# Patient Record
Sex: FEMALE | Race: WHITE | NOT HISPANIC OR LATINO | Employment: FULL TIME | URBAN - METROPOLITAN AREA
[De-identification: names, ages, dates, MRNs, and addresses within clinical notes are randomized per-mention and may not be internally consistent; named-entity substitution may affect disease eponyms.]

---

## 2017-01-04 ENCOUNTER — APPOINTMENT (OUTPATIENT)
Dept: PHYSICAL THERAPY | Facility: REHABILITATION | Age: 62
End: 2017-01-04
Payer: COMMERCIAL

## 2017-01-04 PROCEDURE — 97110 THERAPEUTIC EXERCISES: CPT

## 2017-01-04 PROCEDURE — 97140 MANUAL THERAPY 1/> REGIONS: CPT

## 2017-01-04 PROCEDURE — 97112 NEUROMUSCULAR REEDUCATION: CPT

## 2017-01-06 ENCOUNTER — APPOINTMENT (OUTPATIENT)
Dept: PHYSICAL THERAPY | Facility: REHABILITATION | Age: 62
End: 2017-01-06
Payer: COMMERCIAL

## 2017-01-06 PROCEDURE — 97140 MANUAL THERAPY 1/> REGIONS: CPT

## 2017-01-06 PROCEDURE — 97112 NEUROMUSCULAR REEDUCATION: CPT

## 2017-01-06 PROCEDURE — 97110 THERAPEUTIC EXERCISES: CPT

## 2017-01-09 ENCOUNTER — APPOINTMENT (OUTPATIENT)
Dept: PHYSICAL THERAPY | Facility: REHABILITATION | Age: 62
End: 2017-01-09
Payer: COMMERCIAL

## 2017-01-09 PROCEDURE — 97140 MANUAL THERAPY 1/> REGIONS: CPT

## 2017-01-09 PROCEDURE — 97110 THERAPEUTIC EXERCISES: CPT

## 2017-01-09 PROCEDURE — 97112 NEUROMUSCULAR REEDUCATION: CPT

## 2017-01-11 ENCOUNTER — APPOINTMENT (OUTPATIENT)
Dept: PHYSICAL THERAPY | Facility: REHABILITATION | Age: 62
End: 2017-01-11
Payer: COMMERCIAL

## 2017-01-11 PROCEDURE — 97112 NEUROMUSCULAR REEDUCATION: CPT

## 2017-01-11 PROCEDURE — 97110 THERAPEUTIC EXERCISES: CPT

## 2017-01-11 PROCEDURE — 97140 MANUAL THERAPY 1/> REGIONS: CPT

## 2017-01-16 ENCOUNTER — APPOINTMENT (OUTPATIENT)
Dept: PHYSICAL THERAPY | Facility: REHABILITATION | Age: 62
End: 2017-01-16
Payer: COMMERCIAL

## 2017-01-16 PROCEDURE — 97110 THERAPEUTIC EXERCISES: CPT

## 2017-01-16 PROCEDURE — 97140 MANUAL THERAPY 1/> REGIONS: CPT

## 2017-01-16 PROCEDURE — 97112 NEUROMUSCULAR REEDUCATION: CPT

## 2017-01-18 ENCOUNTER — APPOINTMENT (OUTPATIENT)
Dept: PHYSICAL THERAPY | Facility: REHABILITATION | Age: 62
End: 2017-01-18
Payer: COMMERCIAL

## 2017-01-18 PROCEDURE — 97112 NEUROMUSCULAR REEDUCATION: CPT

## 2017-01-18 PROCEDURE — 97110 THERAPEUTIC EXERCISES: CPT

## 2017-01-18 PROCEDURE — 97140 MANUAL THERAPY 1/> REGIONS: CPT

## 2017-01-23 ENCOUNTER — APPOINTMENT (OUTPATIENT)
Dept: PHYSICAL THERAPY | Facility: REHABILITATION | Age: 62
End: 2017-01-23
Payer: COMMERCIAL

## 2017-01-25 ENCOUNTER — APPOINTMENT (OUTPATIENT)
Dept: PHYSICAL THERAPY | Facility: REHABILITATION | Age: 62
End: 2017-01-25
Payer: COMMERCIAL

## 2017-01-30 ENCOUNTER — APPOINTMENT (OUTPATIENT)
Dept: PHYSICAL THERAPY | Facility: REHABILITATION | Age: 62
End: 2017-01-30
Payer: COMMERCIAL

## 2017-05-02 ENCOUNTER — GENERIC CONVERSION - ENCOUNTER (OUTPATIENT)
Dept: OTHER | Facility: OTHER | Age: 62
End: 2017-05-02

## 2017-05-22 ENCOUNTER — GENERIC CONVERSION - ENCOUNTER (OUTPATIENT)
Dept: OTHER | Facility: OTHER | Age: 62
End: 2017-05-22

## 2017-05-26 ENCOUNTER — GENERIC CONVERSION - ENCOUNTER (OUTPATIENT)
Dept: OTHER | Facility: OTHER | Age: 62
End: 2017-05-26

## 2017-09-06 ENCOUNTER — TRANSCRIBE ORDERS (OUTPATIENT)
Dept: ADMINISTRATIVE | Facility: HOSPITAL | Age: 62
End: 2017-09-06

## 2017-09-06 DIAGNOSIS — D42.9 NEOPLASM OF UNCERTAIN BEHAVIOR OF MENINGES (HCC): Primary | ICD-10-CM

## 2017-09-20 ENCOUNTER — GENERIC CONVERSION - ENCOUNTER (OUTPATIENT)
Dept: OTHER | Facility: OTHER | Age: 62
End: 2017-09-20

## 2017-09-24 DIAGNOSIS — Z12.31 ENCOUNTER FOR SCREENING MAMMOGRAM FOR MALIGNANT NEOPLASM OF BREAST: ICD-10-CM

## 2017-10-07 ENCOUNTER — HOSPITAL ENCOUNTER (OUTPATIENT)
Dept: RADIOLOGY | Age: 62
Discharge: HOME | End: 2017-10-07
Payer: COMMERCIAL

## 2017-10-07 DIAGNOSIS — Z12.31 ENCOUNTER FOR SCREENING MAMMOGRAM FOR MALIGNANT NEOPLASM OF BREAST: ICD-10-CM

## 2017-10-07 PROCEDURE — G0202 SCR MAMMO BI INCL CAD: HCPCS

## 2017-10-20 ENCOUNTER — TRANSCRIBE ORDERS (OUTPATIENT)
Dept: ADMINISTRATIVE | Facility: HOSPITAL | Age: 62
End: 2017-10-20

## 2017-10-20 DIAGNOSIS — R00.2 PALPITATION: ICD-10-CM

## 2017-10-20 DIAGNOSIS — R94.31 ABNORMAL ELECTROCARDIOGRAM: Primary | ICD-10-CM

## 2017-11-02 ENCOUNTER — HOSPITAL ENCOUNTER (OUTPATIENT)
Dept: NON INVASIVE DIAGNOSTICS | Facility: CLINIC | Age: 62
Discharge: HOME | End: 2017-11-02
Payer: COMMERCIAL

## 2017-11-02 DIAGNOSIS — R94.31 ABNORMAL ELECTROCARDIOGRAM: ICD-10-CM

## 2017-11-02 DIAGNOSIS — R00.2 PALPITATION: ICD-10-CM

## 2017-11-02 LAB
CHEST PAIN STATEMENT: NORMAL
MAX DIASTOLIC BP: 80 MMHG
MAX HEART RATE: 155 BPM
MAX PREDICTED HEART RATE: 159 BPM
MAX. SYSTOLIC BP: 206 MMHG
PROTOCOL NAME: NORMAL
REASON FOR TERMINATION: NORMAL
TARGET HR FORMULA: NORMAL
TIME IN EXERCISE PHASE: 540 S

## 2017-11-02 PROCEDURE — 93225 XTRNL ECG REC<48 HRS REC: CPT

## 2017-11-02 PROCEDURE — 93226 XTRNL ECG REC<48 HR SCAN A/R: CPT

## 2017-11-02 PROCEDURE — 93017 CV STRESS TEST TRACING ONLY: CPT

## 2017-11-30 ENCOUNTER — ALLSCRIPTS OFFICE VISIT (OUTPATIENT)
Dept: OTHER | Facility: OTHER | Age: 62
End: 2017-11-30

## 2017-11-30 ENCOUNTER — TRANSCRIBE ORDERS (OUTPATIENT)
Dept: ADMINISTRATIVE | Facility: HOSPITAL | Age: 62
End: 2017-11-30

## 2017-11-30 DIAGNOSIS — I49.1 ATRIAL PREMATURE DEPOLARIZATION: ICD-10-CM

## 2017-11-30 DIAGNOSIS — R00.2 PALPITATIONS: Primary | ICD-10-CM

## 2017-11-30 DIAGNOSIS — D49.0 NEOPLASM OF DIGESTIVE SYSTEM: ICD-10-CM

## 2017-11-30 DIAGNOSIS — R00.2 PALPITATIONS: ICD-10-CM

## 2017-12-05 NOTE — CONSULTS
Assessment    1  Hypertension (401 9) (I10)   2  PAC (premature atrial contraction) (427 61) (I49 1)   3  Palpitations (785 1) (R00 2)    Plan  Hypertension    · EKG/ECG- POC; Status:Complete;   Done: 50PPS4507   Perform: In Office; [phone]; Last Updated By:Scheier, Mackie Cassis; 11/30/2017 8:37:01 AM;Ordered;  For:Hypertension; Ordered By:Jonathan Robert;  PAC (premature atrial contraction)    · (1) BASIC METABOLIC PROFILE; Status:Active; Requested for:30Nov2017;    Perform:[facility]; Due:30Nov2018; Ordered; For:PAC (premature atrial contraction); Ordered By:Jonathan Robert;   · (1) MAGNESIUM; Status:Active; Requested for:30Nov2017;    Perform:[facility]; Due:30Nov2018; Ordered; For:PAC (premature atrial contraction); Ordered By:Jonathan Robert;   · (1) TSH; Status:Active; Requested for:30Nov2017;    Perform:[facility]; Due:30Nov2018; Ordered; For:PAC (premature atrial contraction); Ordered By:Jonathan Robert;  Palpitations    · Metoprolol Succinate ER 25 MG Oral Tablet Extended Release 24 Hour; Take 1  tablet daily   Rx By: Ramila Hampton; Dispense: 0 Days ; #:90 Tablet Extended Release 24 Hour; Refill: 3; For: Palpitations; FINESSE = N; Sent To: Aakash Cronin [id number]   · Follow-up visit in 1 year Evaluation and Treatment  Follow-up  Status: Hold For -  Scheduling  Requested for: 75BPE5796   Ordered; For: Palpitations; Ordered By: Ramila Hampton Performed:  Due: 04CSE5510   · ECHO COMPLETE WITH CONTRAST IF INDICATED; Status:Need Information - Financial  Authorization; Requested for:30Nov2017;    Perform:[facility] Radiology; [phone]; Ordered; For:Palpitations; Ordered By:Jonathan Robert;   · EKG/ECG- POC; Status:Active - Perform Order; Requested PFI:[id number];    Perform: In Office; [phone]; Ordered; For:Palpitations; Ordered By:Jonathan Robert; Discussion/Summary    Premature atrial contractions frequent   No obvious structural heart disease by clinical examination, will do an echocardiogram for a recent stress test was unrevealing  Favor low-dose beta blocker because of the symptoms and frequency  Will check an EKG and blood pressure in 2 weeks times  Regular exercise and stress management recommended  Will check electrolytes as well as well as thyroid function test       Chief Complaint  Pt  here for new patient consult due to palpitations  History of Present Illness  Cardiology HPI Free Text Note Form [facility]: Cardiology consultation for evaluation of palpitations  Most pleasant 26-year-old white female who has no previous cardiac history  She was found to have premature atrial contraction during a colonoscopy  She is mildly symptomatic from that mostly at night when she is at rest  Date do get worse when she drinks caffeinated products  Denies any syncope or presyncope  She denies any chest pain or dyspnea  Symptoms are mild and present for quite some time  There is no crescendo pattern  She underwent a stress test that revealed no evidence of ischemia at high workload, 9 minutes on a Fredrick protocol  There were no exercise-induced arrhythmias  Holter monitor revealed very frequent atrial ectopy with over 12,000 PACs 11 4%  Most of them asymptomatic  There were no sustained tachyarrhythmias  The patient is active but does not exercise regularly  She admits to mild-to-moderate stress in her life  Review of Systems      Cardiac: rhythm problems, has swelling in the right ankle and palpitations present , but as noted in HPI, no chest pain, no fainting/blackouts, no syncope/fainting, no AM fatigue and no witnessed apnea episodes     Skin: No complaints of nonhealing sores or skin rash , no non healing sores present and no rashes seen   Genitourinary: blood in urine, loss of bladder control and post menopausal, but no recurrent urinary tract infections, no difficult urination, no kidney stones and no kidney problems   Psychological: anxiety and palpitations present , but as noted in HPI, no depression, no panic attacks and no difficulty concentrating  General: trouble sleeping, but no appetite changes, no changes in weight, no lack of energy/fatigue, no fever, no night sweats and no frequent infections  Respiratory: No complaints of shortness of breath, cough with sputum, or wheezing , no shortness of breath, no cough/sputum, no wheezing, no phlegm and no hemoptysis  HEENT: nose problems, but no serious eye problems, no hearing problems, no throat problems and no snoring   Gastrointestinal: liver problems, but no nausea, no vomiting, no heartburn, no bloody stools, no diarrhea, no constipation, no abdonimal pain and no rectal bleeding Liver mass, possibly adenoma, expectant management  Hematologic: No complaints of bleeding disorders, anemia, blood clots, or excessive brusing  , no bleeding disorders, no anemia, no blood clots and no excessive bruising   Neurological: daytime sleepiness, but no numbness, no tingling, no weakness, no seizures, no headaches, no dizziness and no diplopia   Musculoskeletal: arthritis and back pain, but no swelling/pain      Active Problems    1  Aftercare following joint replacement (V54 81) (Z47 1)   2  Arthralgia of knee (719 46) (M25 569)   3  Arthritis (716 90) (M19 90)   4  Atrophic vaginitis (627 3) (N95 2)   5  Benign Neoplasm Of The Liver (211 5)   6  Disorder of sacrum (724 6) (M53 3)   7  Dyslipidemia (272 4) (E78 5)   8  Encounter for gynecological examination (V72 31) (Z01 419)   9  Encounter for screening mammogram for malignant neoplasm of breast (V76 12)   (Z12 31)   10  Herpes simplex labialis (054 9) (B00 1)   11  Hypertension (401 9) (I10)   12  Menopause (627 2) (Z78 0)   13  Microscopic hematuria (599 72) (R31 29)   14  Neoplasm of liver (239 0) (D49 0)   15  Nosebleed (784 7) (R04 0)   16  Osteoarthritis of both knees (715 96) (M17 0)   17   Vulvar lesion (624 8) (N90 89)    Past Medical History    · History Of 3  Previous Pregnancies (V61 5)   · History of hematuria (V13 09) (Z87 448)   · History of Previous Pregnancies Resulted In 2  Live Birth(S)    The active problems and past medical history were reviewed and updated today  Surgical History    · History of Cholecystectomy   · History of Hysterectomy   · History of Knee Surgery   · History of Salpingo-oophorectomy Bilateral    The surgical history was reviewed and updated today  Family History  Mother    · Family history of Arthritis (V17 7)  Father    · Family history of Carcinoma Of The Bladder (N10 72)  Family History Reviewed: The family history was reviewed and updated today  Social History    · Denied: History of Alcohol   · Marital History   · [address]   · Never A Smoker  The social history was reviewed and updated today  Current Meds   1  ALPRAZolam 0 5 MG Oral Tablet; Therapy: 96QMZ8198 to (Evaluate:64Ckr9996) Recorded   2  Calcium Citrate +D TABS; Take 1 tablet daily Recorded   3  Cephalexin 500 MG Oral Tablet; Take 4 tablets, one hour prior to dental procedures; Therapy: 02JVU2656 to (Last Rx:20Jun2016)  Requested for: 20Jun2016 Ordered   4  DrRx Mycolog II Cream 15 Grams; apply TID to vagina to the affected area; Therapy: 49XIX7867 to (Last Rx:20Sep2017) Ordered   5  Estrace 0 1 MG/GM Vaginal Cream; insert 1 gram intravaginally twice weekly; Therapy: 77OZJ8240 to (Last Rx:74Xeh6687)  Requested for: 23XDY3047 Ordered   6  Lotrel 5-10 MG Oral Capsule; TAKE 1 CAPSULE DAILY; Therapy: (Recorded:03Dfo8925) to Recorded   7  Mobic 7 5 MG Oral Tablet; TAKE 1 TABLET DAILY AS NEEDED; Therapy: 13UHU0951 to Recorded   8  Vitamin D3 1000 UNIT Oral Tablet; TAKE 1 TABLET DAILY; Therapy: (Recorded:05Jan2016) to Recorded    The medication list was reviewed and updated today  Allergies    1  No Known Drug Allergies    2   FRUIT    Vitals  Signs    Heart Rate: 84, Apical  Pulse Quality: Regular, Apical  Systolic: 447, RUE, Sitting  Diastolic: 90, RUE, Sitting  Height: 5 ft 1 25 in  Weight: 157 lb   BMI Calculated: 29 42  BSA Calculated: 1 71    Physical Exam    Constitutional   General appearance: No acute distress, well appearing and well nourished  appears healthy, overweight, well hydrated and appearance reflects stated age  Neck   Neck and thyroid: Normal, supple, trachea midline, no thyromegaly  Jugular Veins: the JVP was not elevated and no sustained hepatojugular reflux  Pulmonary   Respiratory effort: No increased work of breathing or signs of respiratory distress  Auscultation of lungs: Clear to auscultation, no rales, no rhonchi, no wheezing, good air movement  Cardiovascular   Palpation of heart: Normal PMI, no thrills  The PMI was palpated at the 5th LICS in the midclavicular line  The apical impulse was normal  no precordial heave was noted  Auscultation of heart: Normal rate and rhythm, normal S1 and S2, no murmurs  The heart rate was normal  The rhythm was regular with premature beats  Heart sounds: normal S1, normal S2, no gallop heard, no S3, no S4, no click and the heart sounds were not distant  No pericardial rub  Trigeminy pattern  no murmurs were heard  Carotid pulses: Normal, 2+ bilaterally  Pedal pulses: Normal, 2+ bilaterally  Examination of extremities for edema and/or varicosities: Normal     Chest - Chest: Normal    Neurologic - Speech: Normal     Psychiatric - Orientation to person, place, and time: Normal  Mood and affect: Normal       Results/Data    Rhythm and rate:  normal sinus rhythm  Ectopic Beats: Frequent atrial premature contractions are present  T waves:   there are nonspecific ST-T wave changes  Future Appointments    Date/Time Provider Specialty Site   12/15/2017 08:15 AM JANE Chaves  Surgical Oncology [facility] SURGICAL ONCOLOGY   09/21/2018 08:00 AM MASHA Pitts Obstetrics/Gynecology [facility]     End of Encounter Meds    1   Cephalexin 500 MG Oral Tablet (Cephalexin Monohydrate); Take 4 tablets, one hour prior   to dental procedures; Therapy: 18WTS5253 to (Last Rx:20Jun2016)  Requested for: 20Jun2016 Ordered    2  Estrace 0 1 MG/GM Vaginal Cream; insert 1 gram intravaginally twice weekly; Therapy: 11QGD4217 to (Last Rx:76Dfe1074)  Requested for: 65VPO6204 Ordered    3  Metoprolol Succinate ER 25 MG Oral Tablet Extended Release 24 Hour; Take 1 tablet   daily; Therapy: 41GRY4858 to (Last Rx:30Nov2017)  Requested for: 50FPW2008; Status:   ACTIVE - Transmit to Pharmacy - Awaiting Verification Ordered    4  DrRx Mycolog II Cream 15 Grams; apply TID to vagina to the affected area; Therapy: 82QSW8148 to (Last Rx:20Sep2017) Ordered    5  ALPRAZolam 0 5 MG Oral Tablet; Therapy: 68ASR3066 to (Evaluate:39Puy7373) Recorded   6  Calcium Citrate +D TABS; Take 1 tablet daily Recorded   7  Lotrel 5-10 MG Oral Capsule (Amlodipine Besy-Benazepril HCl); TAKE 1 CAPSULE   DAILY; Therapy: (Recorded:01Rwl2442) to Recorded   8  Mobic 7 5 MG Oral Tablet (Meloxicam); TAKE 1 TABLET DAILY AS NEEDED; Therapy: 77HEP8623 to Recorded   9  Vitamin D3 1000 UNIT Oral Tablet; TAKE 1 TABLET DAILY;    Therapy: (RCHKBJNP:60HEI5006) to Recorded    Signatures   Electronically signed by : JANE Lopez ; Nov 30 2017  9:00AM EST                       (Author)

## 2017-12-08 ENCOUNTER — HOSPITAL ENCOUNTER (OUTPATIENT)
Dept: NON INVASIVE DIAGNOSTICS | Facility: CLINIC | Age: 62
Discharge: HOME | End: 2017-12-08
Payer: COMMERCIAL

## 2017-12-08 ENCOUNTER — GENERIC CONVERSION - ENCOUNTER (OUTPATIENT)
Dept: CARDIOLOGY CLINIC | Facility: CLINIC | Age: 62
End: 2017-12-08

## 2017-12-08 ENCOUNTER — GENERIC CONVERSION - ENCOUNTER (OUTPATIENT)
Dept: OTHER | Facility: OTHER | Age: 62
End: 2017-12-08

## 2017-12-08 DIAGNOSIS — R00.2 PALPITATIONS: ICD-10-CM

## 2017-12-08 LAB
ATRIAL RATE: 66 BPM
P AXIS: 59 DEGREES
PR INTERVAL: 116 MS
QRS AXIS: 50 DEGREES
QRSD INTERVAL: 80 MS
QT INTERVAL: 416 MS
QTC INTERVAL: 436 MS
T WAVE AXIS: 60 DEGREES
VENTRICULAR RATE: 66 BPM

## 2017-12-08 PROCEDURE — 93005 ELECTROCARDIOGRAM TRACING: CPT

## 2017-12-08 PROCEDURE — 93306 TTE W/DOPPLER COMPLETE: CPT

## 2017-12-09 ENCOUNTER — HOSPITAL ENCOUNTER (OUTPATIENT)
Dept: RADIOLOGY | Facility: HOSPITAL | Age: 62
Discharge: HOME | End: 2017-12-09
Attending: SURGERY
Payer: COMMERCIAL

## 2017-12-09 ENCOUNTER — GENERIC CONVERSION - ENCOUNTER (OUTPATIENT)
Dept: OTHER | Facility: OTHER | Age: 62
End: 2017-12-09

## 2017-12-09 DIAGNOSIS — D42.9 NEOPLASM OF UNCERTAIN BEHAVIOR OF MENINGES (HCC): ICD-10-CM

## 2017-12-09 PROCEDURE — A9581 GADOXETATE DISODIUM INJ: HCPCS | Performed by: SURGERY

## 2017-12-09 PROCEDURE — 74183 MRI ABD W/O CNTR FLWD CNTR: CPT

## 2017-12-09 RX ADMIN — GADOXETATE DISODIUM 7 ML: 181.43 INJECTION, SOLUTION INTRAVENOUS at 12:47

## 2017-12-15 ENCOUNTER — GENERIC CONVERSION - ENCOUNTER (OUTPATIENT)
Dept: OTHER | Facility: OTHER | Age: 62
End: 2017-12-15

## 2018-01-10 NOTE — PROGRESS NOTES
Assessment    1  Neoplasm of liver (239 0) (D49 0)    Plan  Neoplasm of liver    · (1) BUN; Status:Active; Requested for:29Apr2018; Perform:[facility]; Due:29Apr2019;Ordered;  For:Neoplasm of liver; Ordered By:Stanton Baez;   · (1) CREATININE; Status:Active; Requested for:29Apr2018; Perform:[facility]; Due:29Apr2019;Ordered;  For:Neoplasm of liver; Ordered By:Stanton Baez;   · * MRI ABDOMEN W WO CONTRAST; Status:Need Information - Financial Authorization; Requested for:29Apr2018; Perform:[facility]; Due:29Apr2019;Ordered;  For:Neoplasm of liver; Ordered By:Stanton Baez;   · Follow Up in 2 Years Evaluation and Treatment  Follow-up  Status: Hold For - Scheduling   Requested for: 29Apr2016   Ordered; For: Neoplasm of liver; Ordered By: Shaq Ching Performed:  Due: 48KIZ8076    Discussion/Summary  Discussion Summary:   81-year-old female with a benign-appearing lesion in the left lobe of the liver  This is probably a hepatic adenoma  It has never been biopsied  I once again discussed risks of rupture with hepatic adenoma  Based on its small size I am comfortable observing this  We discussed symptoms of rupture  The other area stable and is most likely [address]  We discussed no further imaging versus repeating her MRI in 2 years  She would like to repeat this in 2 years, consequently I will repeat her MRI in 2 years and I will see her again at that time for a clinical exam  All her questions were answered  Medication SE Review and Pt Understands Tx: The treatment plan was reviewed with the patient/guardian  The patient/guardian understands and agrees with the treatment plan   Understands and agrees with treatment plan: The treatment plan was reviewed with the patient/guardian  The patient/guardian understands and agrees with the treatment plan      Chief Complaint  Chief Complaint Free Text Note Form: Pt  here for 1 year liver follow up  Pt  had MRI done 12/5/2015   No new complaints  History of Present Illness  Interval History: Patient returns in followup of her liver MRI from December 5, 2015 this revealed stable benign-appearing hepatic lesions  A 12 mm left hepatic lobe lesion was most likely an adenoma  An area near the junction of the right and left lobes was stable last conspicuous 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  She denies any abdominal pain, nausea, vomiting, or weight loss  Her appetite is good  She is not on any hormone replacement  Review of Systems  Complete Female ROS SurgOnc:   Constitutional: The patient denies new or recent history of general fatigue, no recent weight loss, no change in appetite  Eyes: No complaints of visual problems, no scleral icterus  ENT: no complaints of ear pain, no hoarseness, no difficulty swallowing, no tinnitus and no new masses in head, oral cavity, or neck  Cardiovascular: No complaints of chest pain, no palpitations, no ankle edema  Respiratory: No complaints of shortness of breath, no cough  Gastrointestinal: No complaints of jaundice, no bloody stools, no pale stools  Genitourinary: No complaints of dysuria, no hematuria, no nocturia, no frequent urination, no urethral discharge  Musculoskeletal: No complaints of weakness, paralysis, joint stiffness or arthralgias,  Integumentary: No complaints of rash, no new lesions  Neurological: No complaints of convulsions, no seizures, no dizziness  Hematologic/Lymphatic: No complaints of easy bruising  ROS Reviewed:   ROS reviewed  Active Problems    1  Aftercare following joint replacement (V54 81) (Z47 1)   2  Arthralgia of knee (719 46) (M25 569)   3  Arthritis (716 90) (M19 90)   4  Benign Neoplasm Of The Liver (211 5)   5  Disorder of sacrum (724 6) (M53 3)   6  Dyslipidemia (272 4) (E78 5)   7  Encounter for gynecological examination (V72 31) (Z01 419)   8  Encounter for screening mammogram for malignant neoplasm of breast (V76 12)   (Z12 31)   9   Herpes simplex labialis (054 9) (B00 1)   10  Hypertension (401 9) (I10)   11  Menopause (627 2) (Z78 0)   12  Microscopic hematuria (599 72) (R31 2)   13  Neoplasm of liver (239 0) (D49 0)   14  Nosebleed (784 7) (R04 0)   15  Osteoarthritis of both knees (715 96) (M17 0)   16  Vulvar lesion (624 8) (N90 89)    Past Medical History    1  History Of 3  Previous Pregnancies (V61 5)   2  History of hematuria (V13 09) (Z87 448)   3  History of Previous Pregnancies Resulted In 2  Live Birth(S)    Surgical History    1  History of Cholecystectomy   · 2009   2  History of Hysterectomy   · 2004   3  History of Knee Surgery   4  History of Salpingo-oophorectomy Bilateral  Surgical History Reviewed: The surgical history was reviewed and updated today  Family History  Mother    1  Family history of Arthritis (V17 7)  Father    2  Family history of Carcinoma Of The Bladder (A69 36)  Family History Reviewed: The family history was reviewed and updated today  Social History    · Denied: History of Alcohol   · Marital History   · Never A Smoker  Social History Reviewed: The social history was reviewed and updated today  Current Meds   1  ALPRAZolam 0 5 MG Oral Tablet; Therapy: 21DXG8578 to (Evaluate:46Qfd6518) Recorded   2  Calcium Citrate +D TABS; Take 1 tablet daily Recorded   3  Cephalexin 500 MG Oral Tablet; Take 4 tablets, one hour prior to dental procedures; Therapy: 93XZY9670 to (Last Rx:85Yds5422)  Requested for: 84Rdb8635 Ordered   4  Daily Multiple Vitamins Oral Tablet; Take 1 tablet daily Recorded   5  Lanacane Maximum Strength 20-0 2 % External Cream; USE TOPICALLY AS DIRECTED; Therapy: 59NFS2082 to (Evaluate:25Mar2016)  Requested for: 62Dto2929; Last   Rx:24Dkt7761 Ordered   6  Lotrel 5-10 MG Oral Capsule; TAKE 1 CAPSULE DAILY; Therapy: (Recorded:05Pbk8387) to Recorded   7  Mobic 7 5 MG Oral Tablet; take 1 tablet daily as needed Recorded   8   Nystatin-Triamcinolone 555981-3 1 UNIT/GM-% External Cream; apply bid times 10   days; Therapy: 19Apr2016 to (Gleisabellaette Gain)  Requested for: 19Apr2016; Last   Rx:19Apr2016 Ordered   9  Vitamin D3 1000 UNIT Oral Tablet; TAKE 1 TABLET DAILY; Therapy: (Recorded:05Jan2016) to Recorded  Medication List Reviewed: The medication list was reviewed and updated today  Allergies    1  No Known Drug Allergies    2  FRUIT    Vitals  Vital Signs [Data Includes: Current Encounter]    Recorded: 80DLL4281 08:29AM   Temperature 98 1 F   Heart Rate 72   Systolic 271   Diastolic 80   Height 5 ft 2 in   Weight 156 lb    BMI Calculated 28 53   BSA Calculated 1 72     Physical Exam    Constitutional: General appearance: The Patient is well-developed, well-nourished female who appears her stated age in no acute distress  She is pleasant and talkative  HEENT: Sclerae are anicteric  Mucous membranes are moist   Neck is supple without adenopathy  No JVD  Chest: The lungs are clear to auscultation  Cardiac: Heart is regular rate  Abdomen: Abdomen is soft, nontender without masses  Extremities: There is no clubbing or cyanosis  There is no edema  Neuro: Grossly nonfocal   Gait is normal     Lymphatic: no evidence of cervical adenopathy bilaterally  no evidence of axillary adenopathy bilaterally  no evidence of inguinal adenopathy bilaterally  Skin: Warm, anicteric  Results/Data  Results   * MRI Abdomen With and Without Contrast 97ELQ7011 08:54AM Nasima Benito     Test Name Result Flag Reference   MRI Abd W/ & W/O (Report)     [address];;[place];[address]   12/05/2015 0900   12/05/2015 1000   N/A     MRI OF THE ABDOMEN (LIVER) WITH AND WITHOUT CONTRAST     INDICATION- Follow-up hepatic lesions       COMPARISON- MRI abdomen 4/19/2014, 4/20/2013     TECHNIQUE- The following pulse sequences were obtained on a 1 5 T   scanner- Axial T1-weighted in-and-out-of phase, pre-contrast axial T1   with fat saturation, post-contrast dynamic axial T1 with fat saturation   at 20, 70, and 180 seconds, coronal T1 with fat saturation, coronal and   axial T2 with TE of 90 and 180 respectively, axial T2 with fat   saturation, 10 and 20 minute delayed axial T1 with fat saturation   through the liver  7 mL of Eovist was administered intravenously   without immediate complication  Pre- and postcontrast subtraction   images were also obtained  FINDINGS-   Study is limited by rest respiratory artifact  LIVER-   General- Right lobe is elongated which may represent Riedel's variant  No generalized loss of signal on out-of-phase images to suggest   hepatic steatosis  Lesions-      12 mm anterior left hepatic lobe lateral segment nodule is stable  This is hypointense on T1, isointense on T2  Intralesional fat is   again noted on out of phase imaging  No discernible arterial   enhancement  This remains hypovascular on hepatocyte phase imaging  Again this is most consistent with adenoma  Irregular focus of hyperintensity on hepatocyte phase imaging near the   junction of the right and left lobes is also stable or less   conspicuous  Again this demonstrates no precontrast T1 or T2 weighted   signal abnormality and has progressive enhancement from portal venous   to the three-minute delayed images  This again 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,   favoring FNH  7 mm medial segment left hepatic lobe cyst is stable  Vasculature- Portal and hepatic veins patent without evidence of   thrombosis  BILIARY TREE- Normal       GALLBLADDER- Surgically absent  PANCREAS- Normal      ADRENAL GLANDS- Normal      SPLEEN- Normal      KIDNEYS-    Tiny bilateral renal cysts  ABDOMINAL CAVITY- No lymphadenopathy or ascites  BOWEL- Unremarkable MRI appearance  OSSEOUS STRUCTURES- No osseous destruction       EXTRAHEPATIC VASCULAR STRUCTURES- Visualized vasculature is normal      ABDOMINAL WALL- Normal      LUNG BASES- Unremarkable MRI appearance  IMPRESSION-     Stable, benign-appearing hepatic lesions  No new or suspicious hepatic   lesions identified  Tiny renal cysts  Transcribed on- [address], RAD DO   Reading Radiologist- BREN Graf DO   Electronically Var[address], RAD DO   Released Date Time- 12/06/15 1605   ------------------------------------------------------------------------------   [id number]^JEFE DAWN   [id number]^JEFE DAWN     Future Appointments    Date/Time Provider Specialty Site   05/03/2016 08:00 AM JANE Eric  Orthopedic Surgery [facility]   09/07/2016 01:00 PM Lisa Thomas MD Obstetrics/Gynecology [facility] Encounter Meds    1  Cephalexin 500 MG Oral Tablet (Cephalexin Monohydrate); Take 4 tablets, one hour prior   to dental procedures; Therapy: 29TBG9503 to (Last Rx:17Aug2015)  Requested for: 32Yva1293 Ordered    2  Nystatin-Triamcinolone 544693-9 1 UNIT/GM-% External Cream; apply bid times 10   days; Therapy: 37Kfu3781 to ([facility])  Requested for: 16Vbu0226; Last   Rx:72Lqz4493 Ordered    3  Lanacane Maximum Strength 20-0 2 % External Cream; USE TOPICALLY AS DIRECTED; Therapy: 19FPC2357 to (Evaluate:25Mar2016)  Requested for: 30Ipy7061; Last   Rx:53Xwc9349 Ordered    4  ALPRAZolam 0 5 MG Oral Tablet; Therapy: 11FZR0631 to (Evaluate:09Feb2015) Recorded   5  Calcium Citrate +D TABS; Take 1 tablet daily Recorded   6  Daily Multiple Vitamins Oral Tablet; Take 1 tablet daily Recorded   7  Lotrel 5-10 MG Oral Capsule (Amlodipine Besy-Benazepril HCl); TAKE 1 CAPSULE   DAILY; Therapy: (Recorded:20Bzo8889) to Recorded   8  Mobic 7 5 MG Oral Tablet (Meloxicam); take 1 tablet daily as needed Recorded   9  Vitamin D3 1000 UNIT Oral Tablet; TAKE 1 TABLET DAILY;    Therapy: (Recorded:05Jan2016) to Recorded    Signatures   Electronically signed by : JANE Choi ; Apr 29 2016  8:59AM EST (Author)

## 2018-01-12 VITALS
DIASTOLIC BLOOD PRESSURE: 90 MMHG | SYSTOLIC BLOOD PRESSURE: 130 MMHG | WEIGHT: 157 LBS | BODY MASS INDEX: 29.64 KG/M2 | HEIGHT: 61 IN | HEART RATE: 84 BPM

## 2018-01-15 NOTE — MISCELLANEOUS
Message   Recorded as Task   Date: 05/02/2017 08:12 AM, Created By: Love Turner   Task Name: Med Renewal Request   Assigned To: Mickey Weir   Regarding Patient: Alexandre Hardin, Status: Active   CommentHellen Lank - 02 May 2017 8:12 AM     TASK CREATED    recvd auto renewal for estrace, to m87 to sign off yrly in sept        Active Problems    1  Aftercare following joint replacement (V54 81) (Z47 1)   2  Arthralgia of knee (719 46) (M25 569)   3  Arthritis (716 90) (M19 90)   4  Atrophic vaginitis (627 3) (N95 2)   5  Benign Neoplasm Of The Liver (211 5)   6  Disorder of sacrum (724 6) (M53 3)   7  Dyslipidemia (272 4) (E78 5)   8  Encounter for gynecological examination (V72 31) (Z01 419)   9  Encounter for screening mammogram for malignant neoplasm of breast (V76 12)   (Z12 31)   10  Herpes simplex labialis (054 9) (B00 1)   11  Hypertension (401 9) (I10)   12  Menopause (627 2) (Z78 0)   13  Microscopic hematuria (599 72) (R31 29)   14  Neoplasm of liver (239 0) (D49 0)   15  Nosebleed (784 7) (R04 0)   16  Osteoarthritis of both knees (715 96) (M17 0)   17  Vulvar lesion (624 8) (N90 89)    Current Meds   1  ALPRAZolam 0 5 MG Oral Tablet; Therapy: 10FGA2150 to (Evaluate:99Mum9241) Recorded   2  Calcium Citrate +D TABS; Take 1 tablet daily Recorded   3  Cephalexin 500 MG Oral Tablet (Cephalexin Monohydrate); Take 4 tablets, one hour   prior to dental procedures; Therapy: 81TVF3269 to (Last Rx:20Jun2016)  Requested for: 20Jun2016 Ordered   4  Daily Multiple Vitamins Oral Tablet; Take 1 tablet daily Recorded   5  Estrace 0 1 MG/GM Vaginal Cream; insert 1 gram intravaginally twice weekly; Therapy: 42NPV7608 to (Last Rx:99Rbq7523)  Requested for: 52Bvu9715 Ordered   6  Lotrel 5-10 MG Oral Capsule (Amlodipine Besy-Benazepril HCl); TAKE 1 CAPSULE   DAILY; Therapy: (Recorded:87Nxk8648) to Recorded   7  Nystatin-Triamcinolone 111036-5 1 UNIT/GM-% External Cream; apply bid times 10   days;    Therapy: 84WKL9671 to (Victorina Bradford)  Requested for: 19Apr2016; Last   Rx:19Apr2016 Ordered   8  Vitamin D3 1000 UNIT Oral Tablet; TAKE 1 TABLET DAILY; Therapy: (Recorded:05Jan2016) to Recorded    Allergies    1  No Known Drug Allergies    2   FRUIT    Plan  Atrophic vaginitis    · Estrace 0 1 MG/GM Vaginal Cream; insert 1 gram intravaginally twice weekly    Signatures   Electronically signed by : Jake Malik, ; May  2 2017  8:12AM EST                       (Author)

## 2018-01-15 NOTE — MISCELLANEOUS
Message   Recorded as Task   Date: 05/26/2017 07:51 AM, Created By: Sophie Pastrana   Task Name: Med Renewal Request   Assigned To: Juani Salvador   Regarding Patient: Rolo Velez, Status: In Progress   Comment:    Mahogany Santos - 26 May 2017 7:51 AM     TASK CREATED  Caller: Self; [phone] (Home)  pt needs prescription changed for insurance purposes   pt needs 90 day supply of estrace please advise rx [facility]  betehem pt @ [phone]   Ciera Nikolay - 26 May 2017 9:05 AM     TASK IN PROGRESS   Ann Marie Reece - 26 May 2017 9:10 AM     TASK EDITED  rx to her for 90 days    to ct oon call        Active Problems    1  Aftercare following joint replacement (V54 81) (Z47 1)   2  Arthralgia of knee (719 46) (M25 569)   3  Arthritis (716 90) (M19 90)   4  Atrophic vaginitis (627 3) (N95 2)   5  Benign Neoplasm Of The Liver (211 5)   6  Disorder of sacrum (724 6) (M53 3)   7  Dyslipidemia (272 4) (E78 5)   8  Encounter for gynecological examination (V72 31) (Z01 419)   9  Encounter for screening mammogram for malignant neoplasm of breast (V76 12)   (Z12 31)   10  Herpes simplex labialis (054 9) (B00 1)   11  Hypertension (401 9) (I10)   12  Menopause (627 2) (Z78 0)   13  Microscopic hematuria (599 72) (R31 29)   14  Neoplasm of liver (239 0) (D49 0)   15  Nosebleed (784 7) (R04 0)   16  Osteoarthritis of both knees (715 96) (M17 0)   17  Vulvar lesion (624 8) (N90 89)    Current Meds   1  ALPRAZolam 0 5 MG Oral Tablet; Therapy: 33YRB7113 to (Evaluate:07Cis0976) Recorded   2  Calcium Citrate +D TABS; Take 1 tablet daily Recorded   3  Cephalexin 500 MG Oral Tablet (Cephalexin Monohydrate); Take 4 tablets, one hour   prior to dental procedures; Therapy: 25RFK5514 to (Last Rx:20Jun2016)  Requested for: 20Jun2016 Ordered   4  Daily Multiple Vitamins Oral Tablet; Take 1 tablet daily Recorded   5  Estrace 0 1 MG/GM Vaginal Cream; insert 1 gram intravaginally twice weekly;    Therapy: 07Sep2016 to (Last DU:56NRL3803)  Requested for: 08BTE8224 Ordered   6  Lotrel 5-10 MG Oral Capsule (Amlodipine Besy-Benazepril HCl); TAKE 1 CAPSULE   DAILY; Therapy: (Recorded:68Chs3305) to Recorded   7  Nystatin-Triamcinolone 369195-3 1 UNIT/GM-% External Cream; apply bid times 10   days; Therapy: 19Apr2016 to (Maria Luisa Pizano)  Requested for: 19Apr2016; Last   Rx:19Apr2016 Ordered   8  Vitamin D3 1000 UNIT Oral Tablet; TAKE 1 TABLET DAILY; Therapy: (Recorded:05Jan2016) to Recorded    Allergies    1  No Known Drug Allergies    2   FRUIT    Plan  Atrophic vaginitis    · Estrace 0 1 MG/GM Vaginal Cream; insert 1 gram intravaginally twice weekly    Signatures   Electronically signed by : Alexandria Garay, ; May 26 2017  9:11AM EST                       (Author)

## 2018-01-16 NOTE — MISCELLANEOUS
Message   Recorded as Task   Date: 04/19/2016 08:46 AM, Created By: Arsh Long   Task Name: Call Back   Assigned To: Ramy Candelario   Regarding Patient: Matilda Huber, Status: Active   Comment:    Pat Franklin - 19 Apr 2016 8:46 AM     TASK CREATED  Caller: Self; [phone] (Home); [phone] x,,,,, (Work)  feels like she is having an outbreak, Dr Adrianna Garcia said she should call and he will call in another Rx  [phone]   Christine Reece - 19 Apr 2016 9:26 AM     TASK EDITED  per dr Kelley Noyola, refil valcyclovir and add nystatin cream  lm for pt to pu rx and cb with any questions        Active Problems    1  Aftercare following joint replacement (V54 81) (Z47 1)   2  Arthralgia of knee (719 46) (M25 569)   3  Arthritis (716 90) (M19 90)   4  Benign Neoplasm Of The Liver (211 5)   5  Disorder of sacrum (724 6) (M53 3)   6  Dyslipidemia (272 4) (E78 5)   7  Encounter for gynecological examination (V72 31) (Z01 419)   8  Encounter for screening mammogram for malignant neoplasm of breast (V76 12)   (Z12 31)   9  Herpes simplex labialis (054 9) (B00 1)   10  Hypertension (401 9) (I10)   11  Menopause (627 2) (Z78 0)   12  Microscopic hematuria (599 72) (R31 2)   13  Neoplasm of liver (239 0) (D49 0)   14  Nosebleed (784 7) (R04 0)   15  Osteoarthritis of both knees (715 96) (M17 0)   16  Vulvar lesion (624 8) (N90 89)    Current Meds   1  ALPRAZolam 0 5 MG Oral Tablet; Therapy: 01UFP6323 to (Evaluate:89Hjp3970) Recorded   2  Calcium Citrate +D TABS; Take 1 tablet daily Recorded   3  Cephalexin 500 MG Oral Tablet (Cephalexin Monohydrate); Take 4 tablets, one hour   prior to dental procedures; Therapy: 77WTU1480 to (Last Rx:02Kyh0051)  Requested for: 17Aug2015 Ordered   4  Daily Multiple Vitamins Oral Tablet; Take 1 tablet daily Recorded   5  Flonase 50 MCG/ACT Nasal Suspension (Fluticasone Propionate) Recorded   6   Lanacane Maximum Strength 20-0 2 % External Cream; USE TOPICALLY AS   DIRECTED; Therapy: 64CHO5402 to (Evaluate:25Mar2016)  Requested for: 77Lih6476; Last   Rx:89Ram4709 Ordered   7  Lotrel 5-10 MG Oral Capsule (Amlodipine Besy-Benazepril HCl); TAKE 1 CAPSULE   DAILY; Therapy: (Recorded:36Wpo7384) to Recorded   8  Mobic 7 5 MG Oral Tablet (Meloxicam); take 1 tablet daily as needed Recorded   9  ValACYclovir HCl - 1 GM Oral Tablet; TAKE 1 TABLET EVERY 12 HOURS DAILY; Therapy: 51QGK2806 to (Evaluate:86Lqi8059)  Requested for: 12GCI9603; Last   Rx:83Lsh6126 Ordered   10  Vitamin D3 1000 UNIT Oral Tablet; TAKE 1 TABLET DAILY; Therapy: (Recorded:05Jan2016) to Recorded   11  Voltaren 1 % Transdermal Gel (Diclofenac Sodium); APPLY SPARINGLY TO AFFECTED    AREA(S) ONCE DAILY; Therapy: 04ROB3733 to (Last Rx:97Emh6147)  Requested for: 96Oqq7753 Ordered    Allergies    1  No Known Drug Allergies    2   FRUIT    Plan  Herpes simplex labialis    · Nystatin-Triamcinolone 247268-0 1 UNIT/GM-% External Cream; apply bid times  10 days   · ValACYclovir HCl - 1 GM Oral Tablet; TAKE 1 TABLET EVERY 12 HOURS DAILY    Signatures   Electronically signed by : Birgit Miller, ; Apr 19 2016  9:26AM EST                       (Author)

## 2018-01-17 NOTE — MISCELLANEOUS
Message   Recorded as Task   Date: 05/22/2017 08:06 AM, Created By: Rhiannon Gamble   Task Name: Medical Complaint Callback   Assigned To: Plascencia Melanie   Regarding Patient: Mami Hanley, Status: In Progress   Comment:    Kelsey Godinez - 22 May 2017 8:06 AM     TASK CREATED  Caller: Self; Medical Complaint; [phone] (Mobile Phone)  Pt is currently taking Estrace 0 1 and is a pt of M87 and would like to stop taking it  Pt is unsure if she needs to wean herself off of it or she can just stop using it  Kenneth Talley - 22 May 2017 8:31 AM     TASK IN PROGRESS   Kenneth Talley - 22 May 2017 8:39 AM     TASK EDITED  Pt still having painful intercourse - I told her not advisable to stop estrace vag cream now  Offered ov - pt declined  She will stay on present dosage of cream and discuss at [facility]  Active Problems    1  Aftercare following joint replacement (V54 81) (Z47 1)   2  Arthralgia of knee (719 46) (M25 569)   3  Arthritis (716 90) (M19 90)   4  Atrophic vaginitis (627 3) (N95 2)   5  Benign Neoplasm Of The Liver (211 5)   6  Disorder of sacrum (724 6) (M53 3)   7  Dyslipidemia (272 4) (E78 5)   8  Encounter for gynecological examination (V72 31) (Z01 419)   9  Encounter for screening mammogram for malignant neoplasm of breast (V76 12)   (Z12 31)   10  Herpes simplex labialis (054 9) (B00 1)   11  Hypertension (401 9) (I10)   12  Menopause (627 2) (Z78 0)   13  Microscopic hematuria (599 72) (R31 29)   14  Neoplasm of liver (239 0) (D49 0)   15  Nosebleed (784 7) (R04 0)   16  Osteoarthritis of both knees (715 96) (M17 0)   17  Vulvar lesion (624 8) (N90 89)    Current Meds   1  ALPRAZolam 0 5 MG Oral Tablet; Therapy: 95GUE8242 to (Evaluate:48Qqg1612) Recorded   2  Calcium Citrate +D TABS; Take 1 tablet daily Recorded   3  Cephalexin 500 MG Oral Tablet (Cephalexin Monohydrate); Take 4 tablets, one hour   prior to dental procedures;    Therapy: 98PAB7608 to (Last Rx:20Jun2016) Requested for: 20Jun2016 Ordered   4  Daily Multiple Vitamins Oral Tablet; Take 1 tablet daily Recorded   5  Estrace 0 1 MG/GM Vaginal Cream; insert 1 gram intravaginally twice weekly; Therapy: 70XQJ9972 to (Last LB:99ZTE4569)  Requested for: 19BTG4290 Ordered   6  Lotrel 5-10 MG Oral Capsule (Amlodipine Besy-Benazepril HCl); TAKE 1 CAPSULE   DAILY; Therapy: (Recorded:30Avj2411) to Recorded   7  Nystatin-Triamcinolone 235277-6 1 UNIT/GM-% External Cream; apply bid times 10   days; Therapy: 19Apr2016 to (Isabel Ramey)  Requested for: 19Apr2016; Last   Rx:19Apr2016 Ordered   8  Vitamin D3 1000 UNIT Oral Tablet; TAKE 1 TABLET DAILY; Therapy: (Recorded:05Jan2016) to Recorded    Allergies    1  No Known Drug Allergies    2  FRUIT    Signatures   Electronically signed by :  Nelson Ospina, ; May 22 2017  8:39AM EST                       (Author)

## 2018-01-18 NOTE — MISCELLANEOUS
Message   Recorded as Task   Date: 02/15/2016 08:14 AM, Created By: Fox Stewart   Task Name: Medical Complaint Callback   Assigned To: Thais Melanie   Regarding Patient: Mami Hanley, Status: In Progress   Dolly Alford - 15 Feb 2016 8:14 AM     TASK CREATED  Caller: Self; Medical Complaint; [phone] (Mobile Phone)  pt called stated she thought she had a Uz[place] she bought monistat 7 used it for 3 days  now is having burning out side vagina, rash hoing toward rectal area- no white d/c  also used abx ointment swollen   Kenneth Mayank - 15 Feb 2016 8:22 AM     TASK IN PROGRESS   Kenneth Talley - 15 Feb 2016 8:32 AM     TASK EDITED  ! wk ago, pt had irritation from vagina to rectum - bubbles there and a lump that popped  She used monistat - got worse  No dsch  Pt given ov        Active Problems    1  Aftercare following joint replacement (V54 81) (Z47 1)   2  Arthralgia of knee (719 46) (M25 569)   3  Arthritis (716 90) (M19 90)   4  Benign Neoplasm Of The Liver (211 5)   5  Disorder of sacrum (724 6) (M53 3)   6  Dyslipidemia (272 4) (E78 5)   7  Encounter for gynecological examination (V72 31) (Z01 419)   8  Encounter for screening mammogram for malignant neoplasm of breast (V76 12)   (Z12 31)   9  Hypertension (401 9) (I10)   10  Menopause (627 2) (Z78 0)   11  Microscopic hematuria (599 72) (R31 2)   12  Neoplasm of liver (239 0) (D49 0)   13  Nosebleed (784 7) (R04 0)   14  Osteoarthritis of both knees (715 96) (M17 0)    Current Meds   1  ALPRAZolam 0 5 MG Oral Tablet; Therapy: 48VIK4292 to (Evaluate:18Gct8404) Recorded   2  Calcium Citrate +D TABS; Take 1 tablet daily Recorded   3  Cephalexin 500 MG Oral Tablet (Cephalexin Monohydrate); Take 4 tablets, one hour   prior to dental procedures; Therapy: 07CEX8865 to (Last Rx:46Wov6675)  Requested for: 29Rii0602 Ordered   4  Daily Multiple Vitamins Oral Tablet; Take 1 tablet daily Recorded   5   Flonase 50 MCG/ACT SUSP (Fluticasone Propionate) Recorded   6  Lotrel 5-10 MG Oral Capsule (Amlodipine Besy-Benazepril HCl); TAKE 1 CAPSULE   DAILY; Therapy: (Recorded:08Bfe7038) to Recorded   7  Mobic 7 5 MG Oral Tablet (Meloxicam); take 1 tablet daily as needed Recorded   8  Vitamin D3 1000 UNIT Oral Tablet; TAKE 1 TABLET DAILY; Therapy: (Recorded:05Jan2016) to Recorded   9  Voltaren 1 % Transdermal Gel; APPLY SPARINGLY TO AFFECTED AREA(S) ONCE   DAILY; Therapy: 84EWW1681 to (Last Rx:53Lmh6781)  Requested for: 11Grk6274 Ordered    Allergies    1  No Known Drug Allergies    2  FRUIT    Signatures   Electronically signed by :  Malka Ospina, ; Feb 15 2016  8:33AM EST                       (Author)

## 2018-01-22 VITALS
SYSTOLIC BLOOD PRESSURE: 130 MMHG | DIASTOLIC BLOOD PRESSURE: 76 MMHG | BODY MASS INDEX: 29.45 KG/M2 | HEIGHT: 61 IN | WEIGHT: 156 LBS

## 2018-01-24 VITALS
DIASTOLIC BLOOD PRESSURE: 78 MMHG | HEIGHT: 61 IN | TEMPERATURE: 98.2 F | HEART RATE: 88 BPM | SYSTOLIC BLOOD PRESSURE: 132 MMHG | RESPIRATION RATE: 16 BRPM | BODY MASS INDEX: 29.71 KG/M2 | WEIGHT: 157.38 LBS | OXYGEN SATURATION: 97 %

## 2018-07-13 ENCOUNTER — TELEPHONE (OUTPATIENT)
Dept: NEUROLOGY | Facility: CLINIC | Age: 63
End: 2018-07-13

## 2018-08-30 ENCOUNTER — TELEPHONE (OUTPATIENT)
Dept: CARDIOLOGY CLINIC | Facility: CLINIC | Age: 63
End: 2018-08-30

## 2018-08-30 DIAGNOSIS — R00.2 PALPITATIONS: ICD-10-CM

## 2018-08-30 DIAGNOSIS — I10 HYPERTENSION, UNSPECIFIED TYPE: Primary | ICD-10-CM

## 2018-08-30 NOTE — TELEPHONE ENCOUNTER
Klaudia Marthamercedes called to see if she needs any b/w prior to upcoming o/v  She did not do labs ordered in 2017 so I will mail her a script for those but I didn't know if you wanted to add anything else? I have scripts for TSH, Mg and BMP    Please advise     C/b # [phone]

## 2018-09-21 ENCOUNTER — ANNUAL EXAM (OUTPATIENT)
Dept: OBGYN CLINIC | Facility: CLINIC | Age: 63
End: 2018-09-21
Payer: COMMERCIAL

## 2018-09-21 VITALS
DIASTOLIC BLOOD PRESSURE: 74 MMHG | HEIGHT: 61 IN | SYSTOLIC BLOOD PRESSURE: 128 MMHG | WEIGHT: 156.8 LBS | BODY MASS INDEX: 29.6 KG/M2

## 2018-09-21 DIAGNOSIS — N95.2 VAGINAL ATROPHY: ICD-10-CM

## 2018-09-21 DIAGNOSIS — Z12.39 ENCOUNTER FOR SCREENING FOR MALIGNANT NEOPLASM OF BREAST: ICD-10-CM

## 2018-09-21 DIAGNOSIS — Z01.419 ENCOUNTER FOR GYNECOLOGICAL EXAMINATION (GENERAL) (ROUTINE) WITHOUT ABNORMAL FINDINGS: Primary | ICD-10-CM

## 2018-09-21 PROCEDURE — 99396 PREV VISIT EST AGE 40-64: CPT | Performed by: NURSE PRACTITIONER

## 2018-09-21 RX ORDER — ESTRADIOL 0.1 MG/G
1 CREAM VAGINAL
COMMUNITY
Start: 2016-09-07 | End: 2018-09-21 | Stop reason: SDUPTHER

## 2018-09-21 RX ORDER — ALPRAZOLAM 0.5 MG/1
TABLET ORAL AS NEEDED
COMMUNITY
Start: 2015-01-06

## 2018-09-21 RX ORDER — AMLODIPINE BESYLATE AND BENAZEPRIL HYDROCHLORIDE 5; 10 MG/1; MG/1
CAPSULE ORAL
COMMUNITY
Start: 2018-08-10

## 2018-09-21 RX ORDER — CEPHALEXIN 500 MG/1
4 TABLET ORAL
COMMUNITY
Start: 2015-06-19

## 2018-09-21 RX ORDER — MELOXICAM 7.5 MG/1
1 TABLET ORAL DAILY PRN
COMMUNITY
Start: 2017-11-30

## 2018-09-21 RX ORDER — CHOLECALCIFEROL (VITAMIN D3) 25 MCG
1 CAPSULE ORAL DAILY
COMMUNITY
End: 2021-05-05 | Stop reason: HOSPADM

## 2018-09-21 RX ORDER — METOPROLOL SUCCINATE 25 MG/1
25 TABLET, EXTENDED RELEASE ORAL DAILY
COMMUNITY
Start: 2018-08-12 | End: 2019-02-07 | Stop reason: SDUPTHER

## 2018-09-21 RX ORDER — ESTRADIOL 0.1 MG/G
1 CREAM VAGINAL 2 TIMES WEEKLY
Qty: 42.5 G | Refills: 3 | Status: SHIPPED | OUTPATIENT
Start: 2018-09-24 | End: 2019-10-02 | Stop reason: SDUPTHER

## 2018-09-21 NOTE — PROGRESS NOTES
Assessment/Plan:    Vaginal atrophy  Stable on Estrace  Recommended continuing this regimen  Reviewed risks/benefits  Encounter for gynecological examination (general) (routine) without abnormal findings  Benign findings on routine gyn exam  Recommended monthly SBE, annual CBE and annual screening mammo  ASCCP guidelines reviewed and this low risk patient was advised she meets criteria to d/c pap screening given low risk status and s/p hysterectomy  Colonoscopy noted to be up to date  The patient denies STI risk factors and declines testing at this time  Reviewed diet/activity recommendations  Discussed postmenopausal considerations and symptoms to report  RTO in one year for routine annual gyn exam or sooner PRN  Diagnoses and all orders for this visit:    Encounter for gynecological examination (general) (routine) without abnormal findings    Encounter for screening for malignant neoplasm of breast  -     Mammo screening bilateral w cad; Future    Vaginal atrophy  -     estradiol (ESTRACE VAGINAL) 0 1 mg/g vaginal cream; Insert 1 g into the vagina 2 (two) times a week    Other orders  -     amLODIPine-benazepril (LOTREL 5-10) 5-10 MG per capsule;   -     metoprolol succinate (TOPROL-XL) 25 mg 24 hr tablet;   -     Cephalexin 500 MG tablet; Take 4 tablets by mouth  -     Calcium Citrate-Vitamin D (CALCIUM CITRATE + D) 250-200 MG-UNIT TABS; Take 1 tablet by mouth daily  -     ALPRAZolam (XANAX) 0 5 mg tablet; Take by mouth  -     Discontinue: estradiol (ESTRACE VAGINAL) 0 1 mg/g vaginal cream; Insert 1 g into the vagina  -     meloxicam (MOBIC) 7 5 mg tablet; Take 1 tablet by mouth daily as needed  -     Cholecalciferol (VITAMIN D-3) 1000 units CAPS; Take 1 tablet by mouth daily  -     Propylene Glycol (SYSTANE BALANCE OP); Apply to eye          Subjective:      Patient ID: Isabelle  is a 59 y o  female  This patient presents for routine annual gyn exam  Former patient of Dr Cris Newton     Medically stable  Dx'd with PVCs since last visit - followed by Cards and feeling well on meds  Remore h/o MESSI/BSO for DUB and fibroids  She denies acute gyn complaints  She denies  bleeding or spotting, VM sx, pelvic pain, breast concerns, abn discharge, bowel/bladder dysfunction, depression/anx   and monog  Rarely sexually active however relationship is great  Denies STI concerns  Works for an accounting firm in [place]              The following portions of the patient's history were reviewed and updated as appropriate: allergies, current medications, past family history, past medical history, past social history, past surgical history and problem list     Review of Systems   Constitutional: Negative  HENT: Negative  Eyes: Negative  Respiratory: Negative  Cardiovascular: Negative  Gastrointestinal: Negative  Endocrine: Negative  Genitourinary: Negative  Musculoskeletal: Negative  Skin: Negative  Allergic/Immunologic: Negative  Neurological: Negative  Hematological: Negative  Psychiatric/Behavioral: Negative  Objective:      /74 (BP Location: Right arm, Cuff Size: Standard)   Ht 5' 1" (1 549 m)   Wt 71 1 kg (156 lb 12 8 oz)   BMI 29 63 kg/m²          Physical Exam   Constitutional: She is oriented to person, place, and time  She appears well-developed and well-nourished  HENT:   Head: Normocephalic and atraumatic  Eyes: EOM are normal  Pupils are equal, round, and reactive to light  Neck: Normal range of motion  Neck supple  No thyromegaly present  Cardiovascular: Normal rate, regular rhythm and normal heart sounds  Pulmonary/Chest: Effort normal and breath sounds normal  No respiratory distress  She has no wheezes  She has no rales  She exhibits no mass, no tenderness and no deformity  Right breast exhibits no inverted nipple, no mass, no nipple discharge, no skin change and no tenderness   Left breast exhibits no inverted nipple, no mass, no nipple discharge, no skin change and no tenderness  Breasts are symmetrical    Abdominal: Soft  She exhibits no distension and no mass  There is no splenomegaly or hepatomegaly  There is no tenderness  There is no rebound and no guarding  Genitourinary: Rectum normal, vagina normal and uterus normal        No breast swelling, tenderness or discharge  No labial fusion  There is no rash, tenderness, lesion or injury on the right labia  There is no rash, tenderness, lesion or injury on the left labia  Cervix exhibits no motion tenderness, no discharge and no friability  Right adnexum displays no mass, no tenderness and no fullness  Left adnexum displays no mass, no tenderness and no fullness  No erythema, tenderness or bleeding in the vagina  No foreign body in the vagina  No vaginal discharge found  Musculoskeletal: Normal range of motion  Lymphadenopathy:     She has no cervical adenopathy  She has no axillary adenopathy  Neurological: She is alert and oriented to person, place, and time  No cranial nerve deficit  Skin: Skin is warm and dry  No rash noted  No cyanosis  Nails show no clubbing  Psychiatric: She has a normal mood and affect   Her speech is normal and behavior is normal  Judgment and thought content normal  Cognition and memory are normal

## 2018-10-13 ENCOUNTER — HOSPITAL ENCOUNTER (OUTPATIENT)
Dept: RADIOLOGY | Age: 63
Discharge: HOME | End: 2018-10-13
Payer: COMMERCIAL

## 2018-10-13 DIAGNOSIS — Z12.39 ENCOUNTER FOR SCREENING FOR MALIGNANT NEOPLASM OF BREAST: ICD-10-CM

## 2018-10-13 PROCEDURE — 77067 SCR MAMMO BI INCL CAD: CPT

## 2018-10-19 ENCOUNTER — TELEPHONE (OUTPATIENT)
Dept: OBGYN CLINIC | Facility: CLINIC | Age: 63
End: 2018-10-19

## 2018-10-19 NOTE — TELEPHONE ENCOUNTER
----- Message from Dai Meza sent at 10/19/2018 12:45 PM EDT -----  Additional imaging of left breast indicated  Please confirm patient is aware and provide orders if needed

## 2018-10-22 NOTE — TELEPHONE ENCOUNTER
Patient is aware of mammo results and that she needs additional imaging on her left breast  Transferred pt to central scheduling to make an appointment

## 2018-10-25 ENCOUNTER — HOSPITAL ENCOUNTER (OUTPATIENT)
Dept: ULTRASOUND IMAGING | Facility: CLINIC | Age: 63
Discharge: HOME | End: 2018-10-25
Payer: COMMERCIAL

## 2018-10-25 ENCOUNTER — HOSPITAL ENCOUNTER (OUTPATIENT)
Dept: MAMMOGRAPHY | Facility: CLINIC | Age: 63
Discharge: HOME | End: 2018-10-25
Payer: COMMERCIAL

## 2018-10-25 DIAGNOSIS — R92.8 ABNORMAL MAMMOGRAM: ICD-10-CM

## 2018-10-25 PROCEDURE — 77065 DX MAMMO INCL CAD UNI: CPT

## 2018-10-25 PROCEDURE — G0279 TOMOSYNTHESIS, MAMMO: HCPCS

## 2018-10-25 PROCEDURE — 76642 ULTRASOUND BREAST LIMITED: CPT

## 2018-10-29 ENCOUNTER — TELEPHONE (OUTPATIENT)
Dept: OBGYN CLINIC | Facility: CLINIC | Age: 63
End: 2018-10-29

## 2018-10-29 NOTE — TELEPHONE ENCOUNTER
----- Message from Desmond Chance, [address] sent at 10/26/2018  3:11 PM EDT -----  Benign findings on diagnostic breast imaging   Please notify patient and advise she may resume annual screening mammo per radiology

## 2018-11-11 LAB
ALBUMIN SERPL-MCNC: 4 G/DL (ref 3.6–5.1)
ALBUMIN/GLOB SERPL: 1.4 (CALC) (ref 1–2.5)
ALP SERPL-CCNC: 48 U/L (ref 33–130)
ALT SERPL-CCNC: 14 U/L (ref 6–29)
AST SERPL-CCNC: 14 U/L (ref 10–35)
BILIRUB SERPL-MCNC: 1.2 MG/DL (ref 0.2–1.2)
BUN SERPL-MCNC: 12 MG/DL (ref 7–25)
BUN/CREAT SERPL: NORMAL (CALC) (ref 6–22)
CALCIUM SERPL-MCNC: 9 MG/DL (ref 8.6–10.4)
CHLORIDE SERPL-SCNC: 106 MMOL/L (ref 98–110)
CHOLEST SERPL-MCNC: 185 MG/DL
CHOLEST/HDLC SERPL: 3.6 (CALC)
CO2 SERPL-SCNC: 29 MMOL/L (ref 20–32)
CREAT SERPL-MCNC: 0.71 MG/DL (ref 0.5–0.99)
GLOBULIN SER CALC-MCNC: 2.8 G/DL (CALC) (ref 1.9–3.7)
GLUCOSE SERPL-MCNC: 82 MG/DL (ref 65–99)
HDLC SERPL-MCNC: 51 MG/DL
LDLC SERPL CALC-MCNC: 112 MG/DL (CALC)
MAGNESIUM SERPL-MCNC: 2 MG/DL (ref 1.5–2.5)
NONHDLC SERPL-MCNC: 134 MG/DL (CALC)
POTASSIUM SERPL-SCNC: 4.2 MMOL/L (ref 3.5–5.3)
PROT SERPL-MCNC: 6.8 G/DL (ref 6.1–8.1)
SL AMB EGFR AFRICAN AMERICAN: 106 ML/MIN/1.73M2
SL AMB EGFR NON AFRICAN AMERICAN: 91 ML/MIN/1.73M2
SODIUM SERPL-SCNC: 141 MMOL/L (ref 135–146)
TRIGL SERPL-MCNC: 114 MG/DL
TSH SERPL-ACNC: 1.23 MIU/L (ref 0.4–4.5)

## 2018-11-19 ENCOUNTER — OFFICE VISIT (OUTPATIENT)
Dept: CARDIOLOGY CLINIC | Facility: CLINIC | Age: 63
End: 2018-11-19
Payer: COMMERCIAL

## 2018-11-19 VITALS
HEIGHT: 61 IN | HEART RATE: 71 BPM | SYSTOLIC BLOOD PRESSURE: 116 MMHG | BODY MASS INDEX: 29.68 KG/M2 | WEIGHT: 157.2 LBS | DIASTOLIC BLOOD PRESSURE: 70 MMHG

## 2018-11-19 DIAGNOSIS — I49.1 PAC (PREMATURE ATRIAL CONTRACTION): ICD-10-CM

## 2018-11-19 DIAGNOSIS — I10 HYPERTENSION, UNSPECIFIED TYPE: Primary | ICD-10-CM

## 2018-11-19 PROCEDURE — 99213 OFFICE O/P EST LOW 20 MIN: CPT | Performed by: INTERNAL MEDICINE

## 2018-11-19 PROCEDURE — 93000 ELECTROCARDIOGRAM COMPLETE: CPT | Performed by: INTERNAL MEDICINE

## 2018-11-19 NOTE — PROGRESS NOTES
Cardiology Follow Up    Emanuel Sommer  1955  [id number]  [address] [facility] [place]  [address] [facility] [address] [place] [address] Road    1  Hypertension, unspecified type  POCT ECG   2  PAC (premature atrial contraction)         Interval History:   Patient continues to do well from a cardiac point of view     Minimal palpitations  No chest pain or dyspnea, admits to be sedentary  Compliant with low-sodium diet, blood pressures been well controlled ambulatory readings adequate as well  Recent lipid profile revealed an LDL of 112 with total cholesterol 185 and HDL 51  Patient Active Problem List   Diagnosis    Encounter for gynecological examination (general) (routine) without abnormal findings    Vaginal atrophy    Hypertension    PAC (premature atrial contraction)     Past Medical History:   Diagnosis Date    Anxiety     Hematuria     Hypertension     Irregular heart beat      Social History     Social History    Marital status: /Civil Union     Spouse name: N/A    Number of children: N/A    Years of education: N/A     Occupational History    Not on file       Social History Main Topics    Smoking status: Never Smoker    Smokeless tobacco: Never Used    Alcohol use No    Drug use: No    Sexual activity: Not Currently     Other Topics Concern    Not on file     Social History Narrative    No narrative on file      Family History   Problem Relation Age of Onset    Arthritis Mother     Irregular heart beat Mother     Hypertension Mother     Hyperlipidemia Mother     Cancer Father         bladder    Stroke Father     Arthritis Father     ROOPA disease Sister     Ulcerative colitis Daughter     No Known Problems Son     Breast cancer Maternal Grandmother     No Known Problems Maternal Grandfather     Dementia Paternal Grandmother     Obesity Paternal Grandmother     Emphysema Paternal Grandfather     Heart attack Paternal Grandfather     ROOPA disease Sister      Past Surgical History:   Procedure Laterality Date    BILATERAL SALPINGOOPHORECTOMY      last assessed 8/11/14    CHOLECYSTECTOMY  2009    HYSTERECTOMY  2004    KNEE SURGERY         Current Outpatient Prescriptions:     ALPRAZolam (XANAX) 0 5 mg tablet, Take by mouth as needed  , Disp: , Rfl:     amLODIPine-benazepril (LOTREL 5-10) 5-10 MG per capsule, , Disp: , Rfl:     Calcium Citrate-Vitamin D (CALCIUM CITRATE + D) 250-200 MG-UNIT TABS, Take 1 tablet by mouth daily, Disp: , Rfl:     Cholecalciferol (VITAMIN D-3) 1000 units CAPS, Take 1 tablet by mouth daily, Disp: , Rfl:     estradiol (ESTRACE VAGINAL) 0 1 mg/g vaginal cream, Insert 1 g into the vagina 2 (two) times a week, Disp: 42 5 g, Rfl: 3    meloxicam (MOBIC) 7 5 mg tablet, Take 1 tablet by mouth daily as needed, Disp: , Rfl:     metoprolol succinate (TOPROL-XL) 25 mg 24 hr tablet, 25 mg daily  , Disp: , Rfl:     Propylene Glycol (SYSTANE BALANCE OP), Apply to eye, Disp: , Rfl:     Cephalexin 500 MG tablet, Take 4 tablets by mouth, Disp: , Rfl:   No Known Allergies    Labs:  Orders Only on 11/10/2018   Component Date Value    Total Cholesterol 11/10/2018 185     HDL 11/10/2018 51     Triglycerides 11/10/2018 114     LDL Direct 11/10/2018 112*    Chol HDLC Ratio 11/10/2018 3 6     Non-HDL Cholesterol 11/10/2018 134*    Magnesium, Serum 11/10/2018 2 0     Glucose, Random 11/10/2018 82     BUN 11/10/2018 12     Creatinine 11/10/2018 0 71     eGFR Non African American 11/10/2018 91     SL AMB EGFR  AMER* 11/10/2018 106     SL AMB BUN/CREATININE RA* 36/25/7527 NOT APPLICABLE     Sodium 08/67/2960 141     Potassium 11/10/2018 4 2     Chloride 11/10/2018 106     CO2 11/10/2018 29     SL AMB CALCIUM 11/10/2018 9 0     SL AMB PROTEIN, TOTAL 11/10/2018 6 8     Albumin 11/10/2018 4 0     Globulin 11/10/2018 2 8     Albumin/Globulin Ratio 11/10/2018 1  4     TOTAL BILIRUBIN 11/10/2018 1 2     Alkaline Phosphatase 11/10/2018 48     SL AMB AST 11/10/2018 14     SL AMB ALT 11/10/2018 14     TSH 11/10/2018 1 23      Imaging: Us Breast Left Limited (diagnostic)    Result Date: 10/25/2018  Narrative: Patient History: Patient is postmenopausal  Family history of breast cancer at age [de-identified] in maternal grandmother, breast cancer at age 79 in maternal aunt, breast cancer at age 79 in maternal aunt, premenopausal endometrial cancer at age 39 in maternal cousin  Benign excisional biopsy of the left breast, May 23, 2002  Cyst aspiration of the left breast  Took hormonal contraceptives for 4 years  Took estrogen for 1 year 1 month  Taking unspecified hormones for 2 years 6 months  Patient has never smoked  Patient's BMI is 28 3  Reason for exam: addl evaluation requested from abnormal screening  Mammo Diagnostic Left W DBT and CAD: October 25, 2018 - Check In #: [de-identified] 3D Procedure 3D views: CC and MLO view(s) were taken of the left breast  2D Synthetic views: CC and MLO view(s) were taken of the left breast  Technologist: TERRENCE Simpson (R)(M) Prior study comparison: October 13, 2018, mammo screening bilateral W CAD, performed at [address] [place] [address]  October 7, 2017, mammo screening bilateral W CAD, performed at [address] [place] [address]  September 24, 2016, mammo screening bilateral W CAD, performed at [address] [place] [address]  September 19, 2015, digital bilateral screening mammogram, performed at [address] [place] [address]  September 13, 2014, digital bilateral screening mammogram, performed at [address] [place] [address]  September 7, 2013, digital bilateral screening mammogram, performed at [address] [place] [address]  September 1, 2012, digital bilateral screening mammogram, performed at [address] [place] [address]  August 27, 2011, digital bilateral screening mammogram, performed at [address] [place] [address]    August 21, 2010, digital bilateral screening mammogram, performed at [address] [place] [address]  US Breast Left Limited: October 25, 2018 - Check In #: [de-identified] Standard views  Technologist: Ronak Stanley Diagnostic images of LEFT breast were obtained with 3-D tomographic imaging in 2 planes  There are 2 circumscribed nodules identified, one in the far posterior breast located along the nipple line, the other is macrolobulated, in the posterior 3rd of the breast upper outer quadrant 1-2 o'clock  No spiculation, distortion or associated calcification in either  There is no new dominant mass, suspicious microcalcification or distortion  Targeted ultrasound of left was performed with a high frequency linear transducer  In the 12 o'clock position subareolar deep rest there is a 7 x 4 x 6 mm cyst correlating with the mammographic finding  In the 2 o'clock position 4 cm from the nipple there are 2 adjacent benign-appearing cysts, one which is macrolobulated measuring 9 x 5 x 8 mm and the smaller adjacent measuring 5 x 4 x 6 mm  This also correlates with the mammographic finding  CONCLUSION: No evidence of malignancy demonstrated  Benign-appearing cyst correlating with the mammographic findings as above  Return to screening mammography  ACR BI-RADS® Assessments: BiRad:2 - Benign (Overall) Left breast Lt Diag Mammo: BiRad:2 - benign finding in the left breast  Left breast Left Brst US: BiRad:2 - benign finding in the left breast  Recommendation: Routine screening mammogram  The patient is scheduled in a reminder system for screening mammography  Transcription Location: [facility] 143: [id number] Risk Value(s): Tyrer-Cuzick 10 Year: 4 100%, Tyrer-Cuzick Lifetime: 9 200%, Myriad Table: 1 5%, JUANJO 5 Year: 1 5%, NCI Lifetime: 6 7%, MRS : Based on personal and/or family history, consideration of hereditary risk assessment may be warranted      Mammo Diagnostic Left W 3d & Cad    Result Date: 10/25/2018  Narrative: Patient History: Patient is postmenopausal  Family history of breast cancer at age [de-identified] in maternal grandmother, breast cancer at age 79 in maternal aunt, breast cancer at age 79 in maternal aunt, premenopausal endometrial cancer at age 39 in maternal cousin  Benign excisional biopsy of the left breast, May 23, 2002  Cyst aspiration of the left breast  Took hormonal contraceptives for 4 years  Took estrogen for 1 year 1 month  Taking unspecified hormones for 2 years 6 months  Patient has never smoked  Patient's BMI is 28 3  Reason for exam: addl evaluation requested from abnormal screening  Mammo Diagnostic Left W DBT and CAD: October 25, 2018 - Check In #: [de-identified] 3D Procedure 3D views: CC and MLO view(s) were taken of the left breast  2D Synthetic views: CC and MLO view(s) were taken of the left breast  Technologist: TERRENCE Bryant (R)(M) Prior study comparison: October 13, 2018, mammo screening bilateral W CAD, performed at [address] [place] [address]  October 7, 2017, mammo screening bilateral W CAD, performed at [address] [place] [address]  September 24, 2016, mammo screening bilateral W CAD, performed at [address] [place] [address]  September 19, 2015, digital bilateral screening mammogram, performed at [address] [place] [address]  September 13, 2014, digital bilateral screening mammogram, performed at [address] [place] [address]  September 7, 2013, digital bilateral screening mammogram, performed at [address] [place] [address]  September 1, 2012, digital bilateral screening mammogram, performed at [address] [place] [address]  August 27, 2011, digital bilateral screening mammogram, performed at [address] [place] [address]  August 21, 2010, digital bilateral screening mammogram, performed at [address] [place] [address]  US Breast Left Limited: October 25, 2018 - Check In #: [de-identified] Standard views   Technologist: Fito Mae Diagnostic images of LEFT breast were obtained with 3-D tomographic imaging in 2 planes  There are 2 circumscribed nodules identified, one in the far posterior breast located along the nipple line, the other is macrolobulated, in the posterior 3rd of the breast upper outer quadrant 1-2 o'clock  No spiculation, distortion or associated calcification in either  There is no new dominant mass, suspicious microcalcification or distortion  Targeted ultrasound of left was performed with a high frequency linear transducer  In the 12 o'clock position subareolar deep rest there is a 7 x 4 x 6 mm cyst correlating with the mammographic finding  In the 2 o'clock position 4 cm from the nipple there are 2 adjacent benign-appearing cysts, one which is macrolobulated measuring 9 x 5 x 8 mm and the smaller adjacent measuring 5 x 4 x 6 mm  This also correlates with the mammographic finding  CONCLUSION: No evidence of malignancy demonstrated  Benign-appearing cyst correlating with the mammographic findings as above  Return to screening mammography  ACR BI-RADS® Assessments: BiRad:2 - Benign (Overall) Left breast Lt Diag Mammo: BiRad:2 - benign finding in the left breast  Left breast Left Brst US: BiRad:2 - benign finding in the left breast  Recommendation: Routine screening mammogram  The patient is scheduled in a reminder system for screening mammography  Transcription Location: [address] [place] [address] [place]: [id number] Risk Value(s): Tyrer-Cuzick 10 Year: 4 100%, Tyrer-Cuzick Lifetime: 9 200%, Myriad Table: 1 5%, JUANJO 5 Year: 1 5%, NCI Lifetime: 6 7%, MRS : Based on personal and/or family history, consideration of hereditary risk assessment may be warranted  Review of Systems:  Review of Systems   Constitutional: Negative for activity change, fatigue and unexpected weight change  Respiratory: Negative for shortness of breath, wheezing and stridor  Cardiovascular: Positive for palpitations  Negative for chest pain and leg swelling     Neurological: Negative for dizziness and syncope  Psychiatric/Behavioral: Negative for sleep disturbance  Physical Exam:  Physical Exam   Constitutional: She is oriented to person, place, and time  She appears well-developed  No distress  Eyes: No scleral icterus  Neck: No JVD present  Cardiovascular: Normal rate and intact distal pulses  Exam reveals no gallop and no friction rub  No murmur heard  Frequent extra systoles   Pulmonary/Chest: Effort normal and breath sounds normal  No respiratory distress  She has no wheezes  She has no rales  She exhibits no tenderness  Neurological: She is alert and oriented to person, place, and time  Skin: She is not diaphoretic  Psychiatric: She has a normal mood and affect  Discussion/Summary:  Premature atrial contractions, minimally symptomatic at the present time  Echocardiogram this year revealed normal left systolic function with mild-to-moderate tricuspid insufficiency and opens normal pulmonary pressures suggested by Doppler analysis, stress test at that time, she did 9 min of Fredrick protocol there was no ischemia  No structural heart disease, continue current medications  On beta-blocker  Regular exercise recommended    Avoidance of caffeinated products as well

## 2019-02-07 DIAGNOSIS — I10 ESSENTIAL HYPERTENSION: Primary | ICD-10-CM

## 2019-02-07 RX ORDER — METOPROLOL SUCCINATE 25 MG/1
TABLET, EXTENDED RELEASE ORAL
Qty: 90 TABLET | Refills: 0 | Status: SHIPPED | OUTPATIENT
Start: 2019-02-07 | End: 2019-05-06 | Stop reason: SDUPTHER

## 2019-05-06 DIAGNOSIS — I10 ESSENTIAL HYPERTENSION: ICD-10-CM

## 2019-05-06 RX ORDER — METOPROLOL SUCCINATE 25 MG/1
TABLET, EXTENDED RELEASE ORAL
Qty: 90 TABLET | Refills: 0 | Status: SHIPPED | OUTPATIENT
Start: 2019-05-06 | End: 2019-08-01 | Stop reason: SDUPTHER

## 2019-08-01 DIAGNOSIS — I10 ESSENTIAL HYPERTENSION: ICD-10-CM

## 2019-08-01 RX ORDER — METOPROLOL SUCCINATE 25 MG/1
TABLET, EXTENDED RELEASE ORAL
Qty: 90 TABLET | Refills: 0 | Status: SHIPPED | OUTPATIENT
Start: 2019-08-01 | End: 2019-10-23 | Stop reason: SDUPTHER

## 2019-09-24 ENCOUNTER — ANNUAL EXAM (OUTPATIENT)
Dept: OBGYN CLINIC | Facility: CLINIC | Age: 64
End: 2019-09-24
Payer: COMMERCIAL

## 2019-09-24 VITALS — SYSTOLIC BLOOD PRESSURE: 140 MMHG | WEIGHT: 160 LBS | DIASTOLIC BLOOD PRESSURE: 76 MMHG | BODY MASS INDEX: 30.23 KG/M2

## 2019-09-24 DIAGNOSIS — N95.2 VAGINAL ATROPHY: ICD-10-CM

## 2019-09-24 DIAGNOSIS — Z12.39 SCREENING FOR MALIGNANT NEOPLASM OF BREAST: ICD-10-CM

## 2019-09-24 DIAGNOSIS — Z01.419 ENCOUNTER FOR GYNECOLOGICAL EXAMINATION (GENERAL) (ROUTINE) WITHOUT ABNORMAL FINDINGS: Primary | ICD-10-CM

## 2019-09-24 PROCEDURE — S0612 ANNUAL GYNECOLOGICAL EXAMINA: HCPCS | Performed by: NURSE PRACTITIONER

## 2019-09-27 NOTE — PROGRESS NOTES
Assessment/Plan:    Vaginal atrophy  Exam stable and unchanged  Recommended continued use of estrace and again recommended coconut oil for lubrication with intercourse    Encounter for gynecological examination (general) (routine) without abnormal findings  Benign findings on routine gyn exam  Recommended monthly SBE, annual CBE and annual screening mammo  ASCCP guidelines reviewed and this low risk patient was advised she meets criteria to d/c pap screening given cervix is surgically absent  Colon cancer screening up to date  The patient denies STI risk factors and declines testing at this time  Reviewed diet/activity recommendations  Discussed postmenopausal considerations and symptoms to report  RTO in one year for routine annual gyn exam or sooner PRN  Diagnoses and all orders for this visit:    Encounter for gynecological examination (general) (routine) without abnormal findings    Screening for malignant neoplasm of breast  -     Mammo screening bilateral w 3d & cad; Future    Vaginal atrophy          Subjective:      Patient ID: Andria Mccarthy is a 62 y o  female  This patient presents for routine annual gyn exam   Medically stable  S/p hysterectomy for benign indications  She continues estrace for atrophic changes and discomfort with intercourse  IC is rare due to mutually decreased libido and spouse's medical conditions contributing to ED  They have not trialled coconut oil for lubrication  She denies acute gyn complaints  She denies  bleeding or spotting, VM sx, pelvic pain, breast concerns, abn discharge, bowel/bladder dysfunction, depression/anx   and monog  Denies STI concerns  The following portions of the patient's history were reviewed and updated as appropriate: allergies, current medications, past family history, past medical history, past social history, past surgical history and problem list     Review of Systems   Constitutional: Negative      Respiratory: Negative  Cardiovascular: Negative  Gastrointestinal: Negative  Genitourinary: Negative  Musculoskeletal: Negative  Skin: Negative  Neurological: Negative  Psychiatric/Behavioral: Negative  Objective:      /76   Wt 72 6 kg (160 lb)   BMI 30 23 kg/m²          Physical Exam   Constitutional: She is oriented to person, place, and time  She appears well-developed and well-nourished  HENT:   Head: Normocephalic and atraumatic  Eyes: Pupils are equal, round, and reactive to light  EOM are normal    Neck: Normal range of motion  Neck supple  No thyromegaly present  Cardiovascular: Normal rate, regular rhythm and normal heart sounds  Pulmonary/Chest: Effort normal and breath sounds normal  No respiratory distress  She has no wheezes  She has no rales  She exhibits no mass, no tenderness and no deformity  Right breast exhibits no inverted nipple, no mass, no nipple discharge, no skin change and no tenderness  Left breast exhibits no inverted nipple, no mass, no nipple discharge, no skin change and no tenderness  No breast tenderness or discharge  Breasts are symmetrical    Abdominal: Soft  She exhibits no distension and no mass  There is no splenomegaly or hepatomegaly  There is no tenderness  There is no rebound and no guarding  Genitourinary: Rectum normal and vagina normal        No breast tenderness or discharge  No labial fusion  There is no rash, tenderness, lesion or injury on the right labia  There is no rash, tenderness, lesion or injury on the left labia  No erythema, tenderness or bleeding in the vagina  No foreign body in the vagina  No vaginal discharge found  Musculoskeletal: Normal range of motion  Lymphadenopathy:     She has no cervical adenopathy  She has no axillary adenopathy  Neurological: She is alert and oriented to person, place, and time  No cranial nerve deficit  Skin: Skin is warm and dry  No rash noted  No cyanosis   Nails show no clubbing  Psychiatric: She has a normal mood and affect   Her speech is normal and behavior is normal  Judgment and thought content normal  Cognition and memory are normal

## 2019-09-27 NOTE — ASSESSMENT & PLAN NOTE
Benign findings on routine gyn exam  Recommended monthly SBE, annual CBE and annual screening mammo  ASCCP guidelines reviewed and this low risk patient was advised she meets criteria to d/c pap screening given cervix is surgically absent  Colon cancer screening up to date  The patient denies STI risk factors and declines testing at this time  Reviewed diet/activity recommendations  Discussed postmenopausal considerations and symptoms to report  RTO in one year for routine annual gyn exam or sooner PRN

## 2019-10-02 DIAGNOSIS — N95.2 VAGINAL ATROPHY: ICD-10-CM

## 2019-10-03 RX ORDER — ESTRADIOL 0.1 MG/G
1 CREAM VAGINAL 2 TIMES WEEKLY
Qty: 42.5 G | Refills: 3 | Status: SHIPPED | OUTPATIENT
Start: 2019-10-03 | End: 2020-08-20

## 2019-10-19 ENCOUNTER — HOSPITAL ENCOUNTER (OUTPATIENT)
Dept: RADIOLOGY | Age: 64
Discharge: HOME | End: 2019-10-19
Payer: COMMERCIAL

## 2019-10-19 VITALS — HEIGHT: 61 IN | BODY MASS INDEX: 30.21 KG/M2 | WEIGHT: 160 LBS

## 2019-10-19 DIAGNOSIS — Z12.39 SCREENING FOR MALIGNANT NEOPLASM OF BREAST: ICD-10-CM

## 2019-10-19 PROCEDURE — 77063 BREAST TOMOSYNTHESIS BI: CPT

## 2019-10-19 PROCEDURE — 77067 SCR MAMMO BI INCL CAD: CPT

## 2019-10-23 DIAGNOSIS — I10 ESSENTIAL HYPERTENSION: ICD-10-CM

## 2019-10-23 RX ORDER — METOPROLOL SUCCINATE 25 MG/1
TABLET, EXTENDED RELEASE ORAL
Qty: 90 TABLET | Refills: 0 | Status: SHIPPED | OUTPATIENT
Start: 2019-10-23 | End: 2020-02-03

## 2020-02-03 DIAGNOSIS — I10 ESSENTIAL HYPERTENSION: ICD-10-CM

## 2020-02-03 RX ORDER — METOPROLOL SUCCINATE 25 MG/1
TABLET, EXTENDED RELEASE ORAL
Qty: 90 TABLET | Refills: 0 | Status: SHIPPED | OUTPATIENT
Start: 2020-02-03 | End: 2020-04-27

## 2020-04-27 DIAGNOSIS — I10 ESSENTIAL HYPERTENSION: ICD-10-CM

## 2020-04-27 RX ORDER — METOPROLOL SUCCINATE 25 MG/1
TABLET, EXTENDED RELEASE ORAL
Qty: 90 TABLET | Refills: 0 | Status: SHIPPED | OUTPATIENT
Start: 2020-04-27 | End: 2020-07-20

## 2020-07-20 DIAGNOSIS — I10 ESSENTIAL HYPERTENSION: ICD-10-CM

## 2020-07-20 RX ORDER — METOPROLOL SUCCINATE 25 MG/1
TABLET, EXTENDED RELEASE ORAL
Qty: 90 TABLET | Refills: 0 | Status: SHIPPED | OUTPATIENT
Start: 2020-07-20 | End: 2020-10-19 | Stop reason: SDUPTHER

## 2020-08-20 DIAGNOSIS — N95.2 VAGINAL ATROPHY: ICD-10-CM

## 2020-08-20 RX ORDER — ESTRADIOL 0.1 MG/G
1 CREAM VAGINAL 2 TIMES WEEKLY
Qty: 42.5 G | Refills: 3 | Status: SHIPPED | OUTPATIENT
Start: 2020-08-20 | End: 2021-07-29

## 2020-10-07 ENCOUNTER — TRANSCRIBE ORDERS (OUTPATIENT)
Dept: ADMINISTRATIVE | Facility: HOSPITAL | Age: 65
End: 2020-10-07

## 2020-10-07 DIAGNOSIS — Z12.31 ENCOUNTER FOR SCREENING MAMMOGRAM FOR MALIGNANT NEOPLASM OF BREAST: Primary | ICD-10-CM

## 2020-10-14 DIAGNOSIS — I10 ESSENTIAL HYPERTENSION: ICD-10-CM

## 2020-10-19 DIAGNOSIS — I10 ESSENTIAL HYPERTENSION: ICD-10-CM

## 2020-10-19 RX ORDER — METOPROLOL SUCCINATE 25 MG/1
25 TABLET, EXTENDED RELEASE ORAL DAILY
Qty: 90 TABLET | Refills: 1 | Status: SHIPPED | OUTPATIENT
Start: 2020-10-19 | End: 2020-10-22 | Stop reason: SDUPTHER

## 2020-10-22 DIAGNOSIS — I10 ESSENTIAL HYPERTENSION: ICD-10-CM

## 2020-10-22 RX ORDER — METOPROLOL SUCCINATE 25 MG/1
25 TABLET, EXTENDED RELEASE ORAL DAILY
Qty: 90 TABLET | Refills: 0 | Status: SHIPPED | OUTPATIENT
Start: 2020-10-22

## 2020-10-22 RX ORDER — METOPROLOL SUCCINATE 25 MG/1
25 TABLET, EXTENDED RELEASE ORAL DAILY
Qty: 90 TABLET | Refills: 0 | Status: CANCELLED | OUTPATIENT
Start: 2020-10-22

## 2020-10-26 RX ORDER — METOPROLOL SUCCINATE 25 MG/1
TABLET, EXTENDED RELEASE ORAL
Qty: 90 TABLET | Refills: 0 | Status: SHIPPED | OUTPATIENT
Start: 2020-10-26

## 2020-11-10 ENCOUNTER — ANNUAL EXAM (OUTPATIENT)
Dept: OBGYN CLINIC | Facility: CLINIC | Age: 65
End: 2020-11-10
Payer: COMMERCIAL

## 2020-11-10 VITALS
BODY MASS INDEX: 30.42 KG/M2 | WEIGHT: 161 LBS | DIASTOLIC BLOOD PRESSURE: 72 MMHG | TEMPERATURE: 98.1 F | SYSTOLIC BLOOD PRESSURE: 130 MMHG

## 2020-11-10 DIAGNOSIS — N95.2 VAGINAL ATROPHY: ICD-10-CM

## 2020-11-10 DIAGNOSIS — Z01.419 ENCOUNTER FOR GYNECOLOGICAL EXAMINATION (GENERAL) (ROUTINE) WITHOUT ABNORMAL FINDINGS: Primary | ICD-10-CM

## 2020-11-10 PROCEDURE — S0612 ANNUAL GYNECOLOGICAL EXAMINA: HCPCS | Performed by: NURSE PRACTITIONER

## 2020-11-20 ENCOUNTER — OFFICE VISIT (OUTPATIENT)
Dept: CARDIOLOGY CLINIC | Facility: CLINIC | Age: 65
End: 2020-11-20
Payer: COMMERCIAL

## 2020-11-20 VITALS
BODY MASS INDEX: 29.37 KG/M2 | HEIGHT: 62 IN | HEART RATE: 82 BPM | SYSTOLIC BLOOD PRESSURE: 128 MMHG | TEMPERATURE: 97.7 F | DIASTOLIC BLOOD PRESSURE: 78 MMHG | WEIGHT: 159.6 LBS

## 2020-11-20 DIAGNOSIS — I10 HYPERTENSION, UNSPECIFIED TYPE: Primary | ICD-10-CM

## 2020-11-20 DIAGNOSIS — I49.1 PAC (PREMATURE ATRIAL CONTRACTION): ICD-10-CM

## 2020-11-20 PROCEDURE — 93000 ELECTROCARDIOGRAM COMPLETE: CPT | Performed by: INTERNAL MEDICINE

## 2020-11-20 PROCEDURE — 99213 OFFICE O/P EST LOW 20 MIN: CPT | Performed by: INTERNAL MEDICINE

## 2020-11-20 RX ORDER — CYCLOBENZAPRINE HCL 5 MG
5 TABLET ORAL
COMMUNITY

## 2020-11-21 LAB
CHOLEST SERPL-MCNC: 183 MG/DL
CHOLEST/HDLC SERPL: 3.6 (CALC)
HDLC SERPL-MCNC: 51 MG/DL
LDLC SERPL CALC-MCNC: 107 MG/DL (CALC)
NONHDLC SERPL-MCNC: 132 MG/DL (CALC)
TRIGL SERPL-MCNC: 136 MG/DL

## 2020-11-30 ENCOUNTER — HOSPITAL ENCOUNTER (OUTPATIENT)
Dept: RADIOLOGY | Age: 65
Discharge: HOME | End: 2020-11-30
Payer: COMMERCIAL

## 2020-11-30 VITALS — WEIGHT: 158 LBS | HEIGHT: 62 IN | BODY MASS INDEX: 29.08 KG/M2

## 2020-11-30 DIAGNOSIS — Z12.31 ENCOUNTER FOR SCREENING MAMMOGRAM FOR MALIGNANT NEOPLASM OF BREAST: ICD-10-CM

## 2020-11-30 PROCEDURE — 77063 BREAST TOMOSYNTHESIS BI: CPT

## 2020-11-30 PROCEDURE — 77067 SCR MAMMO BI INCL CAD: CPT

## 2021-03-04 NOTE — ASSESSMENT & PLAN NOTE
----- Message from Bharati Mitchell NP sent at 2/25/2021 10:52 AM CST -----  Regarding: Recheck BP  Call patient and get update on BP - had to decrease back on her Amlodipine due to increased swelling.    HAO Bains     Benign findings on routine gyn exam  Recommended monthly SBE, annual CBE and annual screening mammo  ASCCP guidelines reviewed and this low risk patient was advised she meets criteria to d/c pap screening given low risk status and s/p hysterectomy  Colonoscopy noted to be up to date  The patient denies STI risk factors and declines testing at this time  Reviewed diet/activity recommendations  Discussed postmenopausal considerations and symptoms to report  RTO in one year for routine annual gyn exam or sooner PRN

## 2021-03-10 DIAGNOSIS — Z23 ENCOUNTER FOR IMMUNIZATION: ICD-10-CM

## 2021-03-19 ENCOUNTER — IMMUNIZATIONS (OUTPATIENT)
Dept: FAMILY MEDICINE CLINIC | Facility: HOSPITAL | Age: 66
End: 2021-03-19

## 2021-03-19 DIAGNOSIS — Z23 ENCOUNTER FOR IMMUNIZATION: Primary | ICD-10-CM

## 2021-03-19 PROCEDURE — 91301 SARS-COV-2 / COVID-19 MRNA VACCINE (MODERNA) 100 MCG: CPT

## 2021-03-19 PROCEDURE — 0011A SARS-COV-2 / COVID-19 MRNA VACCINE (MODERNA) 100 MCG: CPT

## 2021-04-19 ENCOUNTER — IMMUNIZATIONS (OUTPATIENT)
Dept: FAMILY MEDICINE CLINIC | Facility: HOSPITAL | Age: 66
End: 2021-04-19

## 2021-04-19 DIAGNOSIS — Z23 ENCOUNTER FOR IMMUNIZATION: Primary | ICD-10-CM

## 2021-04-19 PROCEDURE — 0012A SARS-COV-2 / COVID-19 MRNA VACCINE (MODERNA) 100 MCG: CPT

## 2021-04-19 PROCEDURE — 91301 SARS-COV-2 / COVID-19 MRNA VACCINE (MODERNA) 100 MCG: CPT

## 2021-05-01 ENCOUNTER — APPOINTMENT (EMERGENCY)
Dept: RADIOLOGY | Facility: HOSPITAL | Age: 66
End: 2021-05-01
Payer: COMMERCIAL

## 2021-05-01 ENCOUNTER — HOSPITAL ENCOUNTER (EMERGENCY)
Facility: HOSPITAL | Age: 66
Discharge: HOME | End: 2021-05-01
Attending: EMERGENCY MEDICINE | Admitting: EMERGENCY MEDICINE
Payer: COMMERCIAL

## 2021-05-01 VITALS
HEART RATE: 68 BPM | RESPIRATION RATE: 16 BRPM | TEMPERATURE: 98.1 F | SYSTOLIC BLOOD PRESSURE: 175 MMHG | OXYGEN SATURATION: 98 % | DIASTOLIC BLOOD PRESSURE: 79 MMHG

## 2021-05-01 DIAGNOSIS — N20.1 URETEROLITHIASIS: Primary | ICD-10-CM

## 2021-05-01 DIAGNOSIS — N13.30 HYDROURETERONEPHROSIS: ICD-10-CM

## 2021-05-01 LAB
ANION GAP SERPL CALCULATED.3IONS-SCNC: 5 MMOL/L (ref 4–13)
BACTERIA UR QL AUTO: ABNORMAL /HPF
BILIRUB UR QL STRIP: NEGATIVE
BUN SERPL-MCNC: 13 MG/DL (ref 5–25)
CALCIUM SERPL-MCNC: 9.3 MG/DL (ref 8.3–10.1)
CHLORIDE SERPL-SCNC: 110 MMOL/L (ref 100–108)
CLARITY UR: ABNORMAL
CO2 SERPL-SCNC: 26 MMOL/L (ref 21–32)
COLOR UR: ABNORMAL
CREAT SERPL-MCNC: 0.88 MG/DL (ref 0.6–1.3)
GFR SERPL CREATININE-BSD FRML MDRD: 69 ML/MIN/1.73SQ M
GLUCOSE SERPL-MCNC: 93 MG/DL (ref 65–140)
GLUCOSE UR STRIP-MCNC: NEGATIVE MG/DL
HGB UR QL STRIP.AUTO: ABNORMAL
KETONES UR STRIP-MCNC: NEGATIVE MG/DL
LEUKOCYTE ESTERASE UR QL STRIP: ABNORMAL
NITRITE UR QL STRIP: NEGATIVE
NON-SQ EPI CELLS URNS QL MICRO: ABNORMAL /HPF
PH UR STRIP.AUTO: 6.5 [PH]
POTASSIUM SERPL-SCNC: 4.6 MMOL/L (ref 3.5–5.3)
PROT UR STRIP-MCNC: ABNORMAL MG/DL
RBC #/AREA URNS AUTO: ABNORMAL /HPF
SODIUM SERPL-SCNC: 141 MMOL/L (ref 136–145)
SP GR UR STRIP.AUTO: 1.01 (ref 1–1.03)
UROBILINOGEN UR QL STRIP.AUTO: 0.2 E.U./DL
WBC #/AREA URNS AUTO: ABNORMAL /HPF

## 2021-05-01 PROCEDURE — G1004 CDSM NDSC: HCPCS

## 2021-05-01 PROCEDURE — 74176 CT ABD & PELVIS W/O CONTRAST: CPT

## 2021-05-01 PROCEDURE — 80048 BASIC METABOLIC PNL TOTAL CA: CPT | Performed by: EMERGENCY MEDICINE

## 2021-05-01 PROCEDURE — 99284 EMERGENCY DEPT VISIT MOD MDM: CPT | Performed by: EMERGENCY MEDICINE

## 2021-05-01 PROCEDURE — 81001 URINALYSIS AUTO W/SCOPE: CPT | Performed by: EMERGENCY MEDICINE

## 2021-05-01 PROCEDURE — 76775 US EXAM ABDO BACK WALL LIM: CPT | Performed by: EMERGENCY MEDICINE

## 2021-05-01 PROCEDURE — 36415 COLL VENOUS BLD VENIPUNCTURE: CPT | Performed by: EMERGENCY MEDICINE

## 2021-05-01 PROCEDURE — 99284 EMERGENCY DEPT VISIT MOD MDM: CPT

## 2021-05-01 NOTE — ED ATTENDING ATTESTATION
5/1/2021  Cary Arriola DO, saw and evaluated the patient  I have discussed the patient with the resident/non-physician practitioner and agree with the resident's/non-physician practitioner's findings, Plan of Care, and MDM as documented in the resident's/non-physician practitioner's note, except where noted  All available labs and Radiology studies were reviewed  I was present for key portions of any procedure(s) performed by the resident/non-physician practitioner and I was immediately available to provide assistance  At this point I agree with the current assessment done in the Emergency Department  I have conducted an independent evaluation of this patient a history and physical is as follows:    72 yo female presents for eval L sided flank pain radiating to L groin x 1 this morning along with gross hematuria  Denies f/c/n/v, focal weakness/numbness/tingling  Episode was moderate severity, no a/e factors  Currently denies pain however has some pain after palpation of lower abd/LLQ  No dysuria, increased frequency or urgency  Imp: L flank pain, hematuria  ddx stone, UTI  Plan: UA, BMP, POCUS renal  More advanced imaging if necessary        ED Course         Critical Care Time  Procedures

## 2021-05-01 NOTE — ED PROVIDER NOTES
History  Chief Complaint   Patient presents with    Blood in Urine     reports seeing spots of pink blood in her urine intermittently  reports now starting with R sided flank pain and lower back pain   Flank Pain     52-year-old female history of hypertension, microscopic hematuria who presents for evaluation of hematuria and low back pain  She states over the past few years has had intermittent episodes approximately every 6 months of hematuria which she describes as noticing pink blood on the toilet paper when she wipes  She states these episodes have been more frequent over the past few weeks  She states she has noticed blood on the paper when she wipes approximately once a week for the past few weeks  She did see her primary care physician approximately 1 week ago where a urinalysis was performed which was unremarkable other than 0-2 rbc's  No signs of infection at that time  She states she has been evaluated by urologist as well as a nephrologist for the microscopic hematuria in the past with a grossly unremarkable workup  She states both of those doctors have since signed off  Over the past few days, she has noticed some associated low back pain worse on the left than the right  Today that back pain was more persistent and did radiate into her left groin which prompted her evaluation  She denies any history of kidney stones  She does have some lower abdominal pressure but denies any pain  No dysuria, urinary urgency, or frequency  She denies any headache, fever, chills, chest pain, shortness of breath, nausea or vomiting  No constipation or diarrhea  No melena or hematochezia  She does not take any blood thinners  No recent trauma or falls  Prior to Admission Medications   Prescriptions Last Dose Informant Patient Reported? Taking?    ALPRAZolam (XANAX) 0 5 mg tablet 5/1/2021 at Unknown time Self Yes Yes   Sig: Take by mouth as needed     Calcium Citrate-Vitamin D (CALCIUM CITRATE + D) 250-200 MG-UNIT TABS 5/1/2021 at Unknown time Self Yes Yes   Sig: Take 1 tablet by mouth daily   Cephalexin 500 MG tablet Not Taking at Unknown time Self Yes No   Sig: Take 4 tablets by mouth   Cholecalciferol (VITAMIN D-3) 1000 units CAPS  Self Yes No   Sig: Take 1 tablet by mouth daily   Lifitegrast (XIIDRA OP) 5/1/2021 at Unknown time  Yes Yes   Sig: Apply to eye   Propylene Glycol (SYSTANE BALANCE OP) Not Taking at Unknown time Self Yes No   Sig: Apply to eye   amLODIPine-benazepril (LOTREL 5-10) 5-10 MG per capsule 4/30/2021 at Unknown time Self Yes Yes   cyclobenzaprine (FLEXERIL) 5 mg tablet Not Taking at Unknown time  Yes No   Sig: Take 5 mg by mouth daily at bedtime   estradiol (ESTRACE) 0 1 mg/g vaginal cream Past Week at Unknown time Self No Yes   Sig: INSERT 1 G INTO THE VAGINA 2 (TWO) TIMES A WEEK   meloxicam (MOBIC) 7 5 mg tablet 5/1/2021 at Unknown time Self Yes Yes   Sig: Take 1 tablet by mouth daily as needed   metoprolol succinate (TOPROL-XL) 25 mg 24 hr tablet 5/1/2021 at Unknown time Self No Yes   Sig: TAKE 1 TABLET BY MOUTH EVERY DAY   metoprolol succinate (TOPROL-XL) 25 mg 24 hr tablet  Self No No   Sig: Take 1 tablet (25 mg total) by mouth daily      Facility-Administered Medications: None       Past Medical History:   Diagnosis Date    Anxiety     Hematuria     Hypertension     Irregular heart beat        Past Surgical History:   Procedure Laterality Date    BILATERAL SALPINGOOPHORECTOMY      last assessed 8/11/14    BREAST BIOPSY Left     EXCISIONAL BIOPSY BENIGN    CHOLECYSTECTOMY  2009    HYSTERECTOMY  2004    KNEE SURGERY      OOPHORECTOMY Bilateral 2004       Family History   Problem Relation Age of Onset    Arthritis Mother     Irregular heart beat Mother     Hypertension Mother     Hyperlipidemia Mother     Cancer Father         bladder    Stroke Father     Arthritis Father     ROOPA disease Sister     Ulcerative colitis Daughter     No Known Problems Son  Breast cancer Maternal Grandmother [de-identified]    No Known Problems Maternal Grandfather     Dementia Paternal Grandmother     Obesity Paternal Grandmother     Emphysema Paternal Grandfather     Heart attack Paternal Grandfather     ROOPA disease Sister     Breast cancer Maternal Aunt 79    Breast cancer Maternal Aunt 79    Uterine cancer Cousin      I have reviewed and agree with the history as documented  E-Cigarette/Vaping    E-Cigarette Use Never User      E-Cigarette/Vaping Substances     Social History     Tobacco Use    Smoking status: Never Smoker    Smokeless tobacco: Never Used   Substance Use Topics    Alcohol use: No    Drug use: No        Review of Systems   Constitutional: Negative for chills and fever  HENT: Negative for ear pain and sore throat  Eyes: Negative for pain and visual disturbance  Respiratory: Negative for cough and shortness of breath  Cardiovascular: Negative for chest pain and palpitations  Gastrointestinal: Negative for abdominal pain, nausea and vomiting  Genitourinary: Positive for hematuria  Negative for dysuria and vaginal bleeding  Musculoskeletal: Positive for back pain  Negative for arthralgias  Skin: Negative for color change and rash  Neurological: Negative for seizures and syncope  All other systems reviewed and are negative  Physical Exam  ED Triage Vitals [05/01/21 1510]   Temperature Pulse Respirations Blood Pressure SpO2   98 1 °F (36 7 °C) 85 16 (!) 164/114 97 %      Temp src Heart Rate Source Patient Position - Orthostatic VS BP Location FiO2 (%)   -- -- -- -- --      Pain Score       No Pain             Orthostatic Vital Signs  Vitals:    05/01/21 1510 05/01/21 1816   BP: (!) 164/114 (!) 175/79   Pulse: 85 68       Physical Exam  Vitals signs and nursing note reviewed  Constitutional:       Appearance: Normal appearance  HENT:      Head: Normocephalic and atraumatic        Right Ear: External ear normal       Left Ear: External ear normal       Nose: Nose normal    Eyes:      Extraocular Movements: Extraocular movements intact  Conjunctiva/sclera: Conjunctivae normal       Pupils: Pupils are equal, round, and reactive to light  Neck:      Musculoskeletal: Normal range of motion and neck supple  Cardiovascular:      Rate and Rhythm: Normal rate and regular rhythm  Pulses: Normal pulses  Pulmonary:      Effort: No respiratory distress  Breath sounds: No stridor  Abdominal:      General: Abdomen is flat  Tenderness: There is no abdominal tenderness  There is no right CVA tenderness, left CVA tenderness, guarding or rebound  Musculoskeletal: Normal range of motion  General: No deformity  Skin:     General: Skin is warm and dry  Capillary Refill: Capillary refill takes less than 2 seconds  Neurological:      General: No focal deficit present  Mental Status: She is alert and oriented to person, place, and time     Psychiatric:         Mood and Affect: Mood normal          Behavior: Behavior normal          ED Medications  Medications - No data to display    Diagnostic Studies  Results Reviewed     Procedure Component Value Units Date/Time    Urine Microscopic [[id number]]  (Abnormal) Collected: 05/01/21 1749    Lab Status: Final result Specimen: Urine, Clean Catch Updated: 05/01/21 1934     RBC, UA Innumerable /hpf      WBC, UA 10-20 /hpf      Epithelial Cells None Seen /hpf      Bacteria, UA Occasional /hpf     UA (URINE) with reflex to Scope [[id number]]  (Abnormal) Collected: 05/01/21 1749    Lab Status: Final result Specimen: Urine, Clean Catch Updated: 05/01/21 1846     Color, UA Dk Yellow     Clarity, UA Cloudy     Specific Gravity, UA 1 013     pH, UA 6 5     Leukocytes, UA Trace     Nitrite, UA Negative     Protein, UA Trace mg/dl      Glucose, UA Negative mg/dl      Ketones, UA Negative mg/dl      Urobilinogen, UA 0 2 E U /dl      Bilirubin, UA Negative     Blood, UA Large    Basic metabolic panel [[id number]]  (Abnormal) Collected: 05/01/21 1748    Lab Status: Final result Specimen: Blood from Arm, Left Updated: 05/01/21 1818     Sodium 141 mmol/L      Potassium 4 6 mmol/L      Chloride 110 mmol/L      CO2 26 mmol/L      ANION GAP 5 mmol/L      BUN 13 mg/dL      Creatinine 0 88 mg/dL      Glucose 93 mg/dL      Calcium 9 3 mg/dL      eGFR 69 ml/min/1 73sq m     Narrative:      [place] guidelines for Chronic Kidney Disease (CKD):     Stage 1 with normal or high GFR (GFR > 90 mL/min/1 73 square meters)    Stage 2 Mild CKD (GFR = 60-89 mL/min/1 73 square meters)    Stage 3A Moderate CKD (GFR = 45-59 mL/min/1 73 square meters)    Stage 3B Moderate CKD (GFR = 30-44 mL/min/1 73 square meters)    Stage 4 Severe CKD (GFR = 15-29 mL/min/1 73 square meters)    Stage 5 End Stage CKD (GFR <15 mL/min/1 73 square meters)  Note: GFR calculation is accurate only with a steady state creatinine                 CT renal stone study abdomen pelvis wo contrast   Final Result by Patrica Velasquez MD (05/01 1923)   1  Moderate left-sided hydroureteronephrosis down to the distal ureter where there is a 6 mm calculus  The study was marked in [facility] for immediate notification              Workstation performed: [id number]               Procedures  POC Renal US    Date/Time: 5/1/2021 5:42 PM  Performed by: Catherine Carlson MD  Authorized by: Catherine Carlson MD     Patient location:  ED  Performed by:  Resident  Procedure details:     Exam Type:  Diagnostic    Indications: flank/back pain and hematuria      Assessment for:  Suspected hydronephrosis    Views obtained: bladder (transverse and sagittal), left kidney and right kidney      Image quality: diagnostic      Image availability:  Images available in PACS and video obtained  Findings:     LEFT hydronephrosis: mild (grade 1)      RIGHT hydronephrosis: none      Bladder:  Visualized  Interpretation:     Renal ultrasound impressions: hydronephrosis            ED Course  ED Course as of May 01 2349   Sat May 01, 2021   1632 Blood Pressure(!): 164/114   1632 Temperature: 98 1 °F (36 7 °C)   1632 Pulse: 85   1632 Respirations: 16   1632 SpO2: 97 %   1829 Creatinine: 0 88   1907 Blood, UA(!): Large               Identification of Seniors at Risk      Most Recent Value   (ISAR) Identification of Seniors at Risk   Before the illness or injury that brought you to the Emergency, did you need someone to help you on a regular basis? 0 Filed at: 05/01/2021 1512   In the last 24 hours, have you needed more help than usual?  0 Filed at: 05/01/2021 1512   Have you been hospitalized for one or more nights during the past 6 months? 0 Filed at: 05/01/2021 1512   In general, do you see well?  0 Filed at: 05/01/2021 1512   In general, do you have serious problems with your memory? 0 Filed at: 05/01/2021 1512   Do you take more than three different medications every day? 1 Filed at: 05/01/2021 1512   ISAR Score  1 Filed at: 05/01/2021 1512                              MDM  Number of Diagnoses or Management Options  Hydroureteronephrosis:   Ureterolithiasis:   Diagnosis management comments: 71-year-old female presents for evaluation of hematuria and low back pain  On evaluation, patient is resting comfortably in bed in no acute distress  Abdomen is soft, nontender, nondistended without peritoneal signs  No CVA tenderness  Suspect patient's symptoms are secondary to kidney stone  Will evaluate with BMP, urinalysis, ultrasound, and possible CT renal stone study  Labs remarkable for normal kidney function  Urinalysis demonstrated hematuria  She did have some mild left hydronephrosis on ultrasound  CT scan demonstrated 6 mm stone at the UVJ with associated hydroureteronephrosis  As the patient is relatively asymptomatic, she will be discharged home to follow-up with urology  She was given strict return precautions        Disposition  Final diagnoses: Ureterolithiasis   Hydroureteronephrosis     Time reflects when diagnosis was documented in both MDM as applicable and the Disposition within this note     Time User Action Codes Description Comment    5/1/2021  8:00 PM Check, Francine Baptise Add [N20 1] Ureterolithiasis     5/1/2021  8:00 PM Check, Francine Baptise Add [N13 30] Hydroureteronephrosis       ED Disposition     ED Disposition Condition Date/Time Comment    Discharge Stable Sat May 1, 2021  8:00 PM Juhi Ellis discharge to home/self care              Follow-up Information     Follow up With Specialties Details Why Contact Info Additional [address] [facility] Schedule an appointment as soon as possible for a visit   [address] [id number]  [address] [place] [address] For Urology [place], [address] [place], [place], [address] [facility]          Discharge Medication List as of 5/1/2021  8:01 PM      CONTINUE these medications which have NOT CHANGED    Details   ALPRAZolam Jung Stock) 0 5 mg tablet Take by mouth as needed  , Starting Tue 1/6/2015, Historical Med      amLODIPine-benazepril (LOTREL 5-10) 5-10 MG per capsule Starting Fri 8/10/2018, Historical Med      Calcium Citrate-Vitamin D (CALCIUM CITRATE + D) 250-200 MG-UNIT TABS Take 1 tablet by mouth daily, Historical Med      estradiol (ESTRACE) 0 1 mg/g vaginal cream INSERT 1 G INTO THE VAGINA 2 (TWO) TIMES A WEEK, Starting Thu 8/20/2020, Normal      Lifitegrast (Neyda Solo OP) Apply to eye, Historical Med      meloxicam (MOBIC) 7 5 mg tablet Take 1 tablet by mouth daily as needed, Starting Thu 11/30/2017, Historical Med      !! metoprolol succinate (TOPROL-XL) 25 mg 24 hr tablet TAKE 1 TABLET BY MOUTH EVERY DAY, Normal      Cephalexin 500 MG tablet Take 4 tablets by mouth, Starting Fri 6/19/2015, Historical Med      Cholecalciferol (VITAMIN D-3) 1000 units CAPS Take 1 tablet by mouth daily, Historical Med cyclobenzaprine (FLEXERIL) 5 mg tablet Take 5 mg by mouth daily at bedtime, Historical Med      !! metoprolol succinate (TOPROL-XL) 25 mg 24 hr tablet Take 1 tablet (25 mg total) by mouth daily, Starting Thu 10/22/2020, Normal      Propylene Glycol (SYSTANE BALANCE OP) Apply to eye, Historical Med       !! - Potential duplicate medications found  Please discuss with provider  PDMP Review     None           ED Provider  Attending physically available and evaluated Yue Mann I managed the patient along with the ED Attending      Electronically Signed by         Cecilio Merino MD  05/01/21 0948

## 2021-05-03 ENCOUNTER — TELEPHONE (OUTPATIENT)
Dept: UROLOGY | Facility: AMBULATORY SURGERY CENTER | Age: 66
End: 2021-05-03

## 2021-05-03 NOTE — TELEPHONE ENCOUNTER
Reason for appointment/Complaint/Diagnosis :    Ureterolithiasis   Hydroureteronephrosis  Insurance:[employer]  History of Cancer? no                   If yes, what kind? n/a  Previous urologist?   does not remember              Records requested/where?  no    Outside testing/where?no  Location Preference for office visit?  [facility]

## 2021-05-04 NOTE — TELEPHONE ENCOUNTER
Called Iva day and scheduled her tomorrow @ 4:00 with Louann Hernandez   She is aware of time and location

## 2021-05-05 ENCOUNTER — OFFICE VISIT (OUTPATIENT)
Dept: UROLOGY | Facility: AMBULATORY SURGERY CENTER | Age: 66
End: 2021-05-05
Payer: COMMERCIAL

## 2021-05-05 VITALS
DIASTOLIC BLOOD PRESSURE: 84 MMHG | SYSTOLIC BLOOD PRESSURE: 132 MMHG | BODY MASS INDEX: 29.63 KG/M2 | HEIGHT: 62 IN | WEIGHT: 161 LBS

## 2021-05-05 DIAGNOSIS — N20.1 URETEROLITHIASIS: ICD-10-CM

## 2021-05-05 DIAGNOSIS — N13.30 HYDROURETERONEPHROSIS: ICD-10-CM

## 2021-05-05 DIAGNOSIS — N20.0 NEPHROLITHIASIS: Primary | ICD-10-CM

## 2021-05-05 PROCEDURE — 99203 OFFICE O/P NEW LOW 30 MIN: CPT | Performed by: NURSE PRACTITIONER

## 2021-05-05 RX ORDER — DIFLUNISAL 500 MG/1
TABLET, FILM COATED ORAL
COMMUNITY
Start: 2020-01-07

## 2021-05-05 RX ORDER — CARBOXYMETHYLCELLULOSE SODIUM AND GLYCERIN 10; 9 MG/ML; MG/ML
SOLUTION/ DROPS OPHTHALMIC
COMMUNITY
Start: 2020-06-01

## 2021-05-05 NOTE — PROGRESS NOTES
5/5/2021      No chief complaint on file  Assessment and Plan    73 y o  female managed by Dr Brandon De Luna    1  Nephrolithiasis  ·  CT of the abdomen pelvis reveals a left-sided 6 mm calculi at the left UVJ with moderate left-sided hydroureteronephrosis  ·  will continue with medical expulsive therapy as patient is currently asymptomatic  ·  we discussed the need to strain all urine  ·  increase water intake upwards to 60-80 oz of water intake per day  ·  ER precautions discussed  ·  ultrasound kidney bladder and KUB to be performed in 3 weeks  ·  a follow-up in the office in 4 weeks; Patient wishes to follow in the [place] office for next visit as it is closer to home  History of Present Illness  Lencho Najera is a 73 y o  female here for follow up evaluation of nephrolithiasis  Patient reports approximately 3 and half weeks ago noticing trace gross blood with urination and back pain  She was seen by her PCP   And had urine testing performed  Urine testing came back positive for microscopic hematuria  Urine culture was negative for growth  On 05/01/2022 in 1 patient noted increasing back pain, flank pain and left abdominal cramping and presented to the emergency department for evaluation  On evaluation in the emergency department patient underwent CT stone study with results of a 6 mm stone in the distal ureter at the UVJ with left-sided hydroureteronephrosis noted  She was given medications for medical pulse of therapy and follow-up with Urology  Since her evaluation in the emergency department, she has had a decrease in her flank pain, abdominal pain  She denies lower urinary tract symptoms  She denies passage of stone in her urinary stream per her knowledge but has been asymptomatic for quite some time  She currently denies dysuria, hematuria, urinary frequency and urgency  He denies fever and chills  Review of Systems   Constitutional: Negative for chills and fever     Respiratory: Negative for cough and shortness of breath  Cardiovascular: Negative for chest pain  Gastrointestinal: Negative for abdominal distention, abdominal pain, blood in stool, nausea and vomiting  Genitourinary: Positive for flank pain and hematuria  Negative for difficulty urinating, dysuria, enuresis, frequency and urgency  Skin: Negative for rash       Past Medical History  Past Medical History:   Diagnosis Date    Anxiety     Hematuria     Hypertension     Irregular heart beat        Past Social History  Past Surgical History:   Procedure Laterality Date    BILATERAL SALPINGOOPHORECTOMY      last assessed 8/11/14    BREAST BIOPSY Left     EXCISIONAL BIOPSY BENIGN    CHOLECYSTECTOMY  2009    HYSTERECTOMY  2004    KNEE SURGERY      OOPHORECTOMY Bilateral 2004     Social History     Tobacco Use   Smoking Status Never Smoker   Smokeless Tobacco Never Used       Past Family History  Family History   Problem Relation Age of Onset    Arthritis Mother     Irregular heart beat Mother     Hypertension Mother     Hyperlipidemia Mother    [facility] [facility] Father         bladder    Stroke Father     Arthritis Father     ROOPA disease Sister     Ulcerative colitis Daughter     No Known Problems Son     Breast cancer Maternal Grandmother [de-identified]    No Known Problems Maternal Grandfather     Dementia Paternal Grandmother     Obesity Paternal Grandmother     Emphysema Paternal Grandfather     Heart attack Paternal Grandfather     ROOPA disease Sister     Breast cancer Maternal Aunt 79    Breast cancer Maternal Aunt 79    Uterine cancer Cousin        Past Social history  Social History     Socioeconomic History    Marital status: /Civil Union     Spouse name: Not on file    Number of children: Not on file    Years of education: Not on file    Highest education level: Not on file   Occupational History    Not on file   Social Needs    Financial resource strain: Not on file    Food insecurity     Worry: Not on file     Inability: Not on file    Transportation needs     Medical: Not on file     Non-medical: Not on file   Tobacco Use    Smoking status: Never Smoker    Smokeless tobacco: Never Used   Substance and Sexual Activity    Alcohol use: No    Drug use: No    Sexual activity: Yes     Partners: Male   Lifestyle    Physical activity     Days per week: Not on file     Minutes per session: Not on file    Stress: Not on file   Relationships    Social connections     Talks on phone: Not on file     Gets together: Not on file     Attends Judaism service: Not on file     Active member of club or organization: Not on file     Attends meetings of clubs or organizations: Not on file     Relationship status: Not on file    Intimate partner violence     Fear of current or ex partner: Not on file     Emotionally abused: Not on file     Physically abused: Not on file     Forced sexual activity: Not on file   Other Topics Concern    Not on file   Social History Narrative    Not on file       Current Medications  Current Outpatient Medications   Medication Sig Dispense Refill    ALPRAZolam (XANAX) 0 5 mg tablet Take by mouth as needed        amLODIPine-benazepril (LOTREL 5-10) 5-10 MG per capsule       Calcium Citrate-Vitamin D (CALCIUM CITRATE + D) 250-200 MG-UNIT TABS Take 1 tablet by mouth daily      Carboxymethylcellul-Glycerin (Refresh Optive) 1-0 9 % GEL       Cephalexin 500 MG tablet Take 4 tablets by mouth      cyclobenzaprine (FLEXERIL) 5 mg tablet Take 5 mg by mouth daily at bedtime      diflunisal (DOLOBID) 500 mg tablet       estradiol (ESTRACE) 0 1 mg/g vaginal cream INSERT 1 G INTO THE VAGINA 2 (TWO) TIMES A WEEK 42 5 g 3    Lifitegrast (XIIDRA OP) Apply to eye      meloxicam (MOBIC) 7 5 mg tablet Take 1 tablet by mouth daily as needed      metoprolol succinate (TOPROL-XL) 25 mg 24 hr tablet Take 1 tablet (25 mg total) by mouth daily 90 tablet 0    metoprolol succinate (TOPROL-XL) 25 mg 24 hr tablet TAKE 1 TABLET BY MOUTH EVERY DAY 90 tablet 0    Propylene Glycol (SYSTANE BALANCE OP) Apply to eye       No current facility-administered medications for this visit  Allergies  No Known Allergies      The following portions of the patient's history were reviewed and updated as appropriate: allergies, current medications, past medical history, past social history, past surgical history and problem list       Vitals  Vitals:    05/05/21 1553   BP: 132/84   BP Location: Left arm   Patient Position: Sitting   Cuff Size: Adult   Weight: 73 kg (161 lb)   Height: 5' 1 5" (1 562 m)           Physical Exam  Physical Exam  Vitals signs reviewed  Constitutional:       General: She is not in acute distress  Appearance: Normal appearance  Eyes:      Pupils: Pupils are equal, round, and reactive to light  Cardiovascular:      Heart sounds: Normal heart sounds  Pulmonary:      Effort: Pulmonary effort is normal  No respiratory distress  Breath sounds: Normal breath sounds  Abdominal:      Tenderness: There is no right CVA tenderness or left CVA tenderness  Musculoskeletal: Normal range of motion  Skin:     General: Skin is warm and dry  Neurological:      General: No focal deficit present  Mental Status: She is alert  Psychiatric:         Mood and Affect: Mood normal          Behavior: Behavior normal        Results  No results found for this or any previous visit (from the past 1 hour(s))  ]  No results found for: PSA  Lab Results   Component Value Date    GLUCOSE 83 04/28/2015    CALCIUM 9 3 05/01/2021     04/28/2015    K 4 6 05/01/2021    CO2 26 05/01/2021     (H) 05/01/2021    BUN 13 05/01/2021    CREATININE 0 88 05/01/2021     Lab Results   Component Value Date    WBC 13 13 (H) 05/09/2015    HGB 9 0 (L) 05/09/2015    HCT 28 2 (L) 05/09/2015    MCV 94 05/09/2015     05/09/2015     CT ABDOMEN AND PELVIS WITHOUT IV CONTRAST - LOW DOSE RENAL STONE    INDICATION:   Flank pain, kidney stone suspected  Hematuria, renal cause suspected  low back pain and hematuria, eval for stone      COMPARISON:  MRI abdomen 12/9/2017     TECHNIQUE:  Low dose thin section CT examination of the abdomen and pelvis was performed without intravenous or oral contrast according to a protocol specifically designed to evaluate for urinary tract calculus  Axial, sagittal, and coronal 2D   reformatted images were created from the source data and submitted for interpretation  Evaluation for pathology in the abdomen and pelvis that is unrelated to urinary tract calculi is limited       Radiation dose length product (DLP) for this visit:  420 59 mGy-cm   This examination, like all CT scans performed in the [facility], was performed utilizing techniques to minimize radiation dose exposure, including the use of iterative   reconstruction and automated exposure control       FINDINGS:     RIGHT KIDNEY AND URETER:  No urinary tract calculi  No hydronephrosis or hydroureter  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      LEFT KIDNEY AND URETER:  Moderate left-sided hydroureteronephrosis down to the distal ureter where there is a 6 mm calculus  Left periureteral and perinephric fat stranding identified  2 mm nonobstructing left lower pole calculus  11 millimeter left renal midpole cyst, stable      URINARY BLADDER:   Unremarkable      No significant abnormality in the visualized lung bases      Limited low radiation dose noncontrast CT evaluation demonstrates:  Stable 12 mm cyst in the left lobe of the liver anteriorly  Noncontrast spleen and pancreas are unremarkable  Status post cholecystectomy  Bilateral adrenal glands are unremarkable  No ascites or bulky lymphadenopathy on this limited noncontrast study  Colonic diverticula are noted, without evidence to suggest acute diverticulitis    Visualized bowel appears otherwise unremarkable  Limited evaluation demonstrates no evidence to suggest acute appendicitis  Lobular calcified lesion at the L1 vertebral body likely a bone island, stable  Mild anterolisthesis of L5 on S1  Multilevel degenerative changes of the visualized spine         IMPRESSION:  1  Moderate left-sided hydroureteronephrosis down to the distal ureter where there is a 6 mm calculus  Orders  Orders Placed This Encounter   Procedures    US kidney and bladder     Standing Status:   Future     Standing Expiration Date:   5/5/2025     Scheduling Instructions:      "Prep required if being scheduled in conjunction with other studies, refer to those examination's Preps first before scheduling  All patients for US Kidney and Bladder they must drink 24 oz of water 60 minutes before your scheduled appointment time  This test requires you to have a FULL bladder  Please do not urinate before your test             Please bring your physician order, insurance cards, a form of photo ID and a list of your medications with you  Arrive 15 minutes prior to your appointment time in order to register  If you need to have lab work or a urinalysis, please do this AFTER your ultrasound  "     Order Specific Question:   Reason for Exam:     Answer:   nephrolithiasis    XR abdomen 1 view kub     Standing Status:   Future     Standing Expiration Date:   5/5/2025     Scheduling Instructions:      Bring along any outside films relating to this procedure               MASHA Medina

## 2021-05-05 NOTE — PATIENT INSTRUCTIONS
Maintain adequate hydration upwards to 40-60 oz of water intake per day  Continue to monitor for progression of urinary symptoms-strain urine  If he has any notice your having increasing pain, discomfort, fever, chills or difficulty initiating urinary stream please call the office  Follow-up with x-ray and ultrasound of kidney and bladder in 3 weeks  Call the office for questions or concerns

## 2021-05-15 LAB
CREAT ?TM UR-SCNC: 186 UMOL/L
EXT MICROALBUMIN URINE RANDOM: 3.2
MICROALBUMIN/CREAT UR: 17 MG/G{CREAT}

## 2021-05-20 ENCOUNTER — TELEPHONE (OUTPATIENT)
Dept: UROLOGY | Facility: AMBULATORY SURGERY CENTER | Age: 66
End: 2021-05-20

## 2021-05-20 NOTE — TELEPHONE ENCOUNTER
Patient managed by Arnav Braun is calling to say she had urine culture done and was positive  Placed on Nitrofurantoin mono-  mg 5 days 2 times a day  Will have her pcp which is [facility] send notes to office fax at [phone]   fyi

## 2021-05-24 ENCOUNTER — TRANSCRIBE ORDERS (OUTPATIENT)
Dept: LAB | Facility: HOSPITAL | Age: 66
End: 2021-05-24

## 2021-05-24 ENCOUNTER — APPOINTMENT (OUTPATIENT)
Dept: LAB | Facility: HOSPITAL | Age: 66
End: 2021-05-24
Payer: COMMERCIAL

## 2021-05-24 ENCOUNTER — TELEPHONE (OUTPATIENT)
Dept: UROLOGY | Facility: AMBULATORY SURGERY CENTER | Age: 66
End: 2021-05-24

## 2021-05-24 DIAGNOSIS — N20.0 NEPHROLITHIASIS: Primary | ICD-10-CM

## 2021-05-24 PROCEDURE — 82360 CALCULUS ASSAY QUANT: CPT

## 2021-05-26 ENCOUNTER — HOSPITAL ENCOUNTER (OUTPATIENT)
Dept: RADIOLOGY | Age: 66
Discharge: HOME | End: 2021-05-26
Payer: COMMERCIAL

## 2021-05-26 ENCOUNTER — APPOINTMENT (OUTPATIENT)
Dept: RADIOLOGY | Age: 66
End: 2021-05-26
Payer: COMMERCIAL

## 2021-05-26 DIAGNOSIS — N20.0 NEPHROLITHIASIS: ICD-10-CM

## 2021-05-26 PROCEDURE — 74018 RADEX ABDOMEN 1 VIEW: CPT

## 2021-05-26 PROCEDURE — 76770 US EXAM ABDO BACK WALL COMP: CPT

## 2021-05-28 LAB
CALCIUM OXALATE DIHYDRATE MFR STONE IR: 80 %
COLOR STONE: NORMAL
COM MFR STONE: 15 %
COMMENT-STONE3: NORMAL
COMPOSITION: NORMAL
HYDROXYAPATITE 24H ENGDIFF UR: 5 %
LABORATORY COMMENT REPORT: NORMAL
PHOTO: NORMAL
SIZE STONE: NORMAL MM
SPEC SOURCE SUBJ: NORMAL
STONE ANALYSIS-IMP: NORMAL
WT STONE: 46 MG

## 2021-06-03 ENCOUNTER — TELEPHONE (OUTPATIENT)
Dept: UROLOGY | Facility: MEDICAL CENTER | Age: 66
End: 2021-06-03

## 2021-06-03 NOTE — TELEPHONE ENCOUNTER
----- Message from Cory Flanagan  sent at 6/2/2021  4:29 PM EDT -----   Vaishali Saeed to notify patient that previously seen stone has passed

## 2021-06-07 ENCOUNTER — TRANSCRIBE ORDERS (OUTPATIENT)
Dept: ADMINISTRATIVE | Facility: HOSPITAL | Age: 66
End: 2021-06-07

## 2021-06-07 DIAGNOSIS — N95.1 MENOPAUSAL STATE: Primary | ICD-10-CM

## 2021-06-09 ENCOUNTER — OFFICE VISIT (OUTPATIENT)
Dept: UROLOGY | Facility: AMBULATORY SURGERY CENTER | Age: 66
End: 2021-06-09
Payer: COMMERCIAL

## 2021-06-09 VITALS
BODY MASS INDEX: 29.81 KG/M2 | WEIGHT: 162 LBS | HEIGHT: 62 IN | DIASTOLIC BLOOD PRESSURE: 82 MMHG | HEART RATE: 72 BPM | SYSTOLIC BLOOD PRESSURE: 130 MMHG

## 2021-06-09 DIAGNOSIS — N20.0 NEPHROLITHIASIS: Primary | ICD-10-CM

## 2021-06-09 PROCEDURE — 3008F BODY MASS INDEX DOCD: CPT | Performed by: NURSE PRACTITIONER

## 2021-06-09 PROCEDURE — 99213 OFFICE O/P EST LOW 20 MIN: CPT | Performed by: NURSE PRACTITIONER

## 2021-06-09 PROCEDURE — 1036F TOBACCO NON-USER: CPT | Performed by: NURSE PRACTITIONER

## 2021-06-09 NOTE — PATIENT INSTRUCTIONS
US and KUB in 1 year  Increase water intake upwards to 40-60 oz per day  Limit/ decrease foods and beverages known to cause the formation of stones  Call the office for concerns or questions

## 2021-06-09 NOTE — PROGRESS NOTES
6/9/2021      Chief Complaint   Patient presents with    Nephrolithiasis     Assessment and Plan    73 y o  female managed by Dr Nina Ann     1   Nephrolithiasis  ·  ultrasound of kidney and bladder performed 05/26/2020 reveals passage of calculi with no calculi present and no hydronephrosis  ·  KUB performed 05/26/2021 unremarkable  ·  maintain adequate hydration upwards to 40 oz of water intake per day, add lemon to water  ·  follow a low-salt, low oxalate diet  ·  ER precautions discussed  ·  repeat ultrasound of kidney and bladder and KUB in 1 year  ·  follow up in the office in 1 year    History of Present Illness  Fadi Villa is a 73 y o  female here for follow up evaluation of  nephrolithiasis  Patient reports approximately 3 and half weeks ago noticing trace gross blood with urination and back pain  She was seen by her PCP   And had urine testing performed  Urine testing came back positive for microscopic hematuria  Urine culture was negative for growth  On 05/01/2022 in 1 patient noted increasing back pain, flank pain and left abdominal cramping and presented to the emergency department for evaluation  On evaluation in the emergency department patient underwent CT stone study with results of a 6 mm stone in the distal ureter at the UVJ with left-sided hydroureteronephrosis noted  She was given medications for medical pulse of therapy and follow-up with Urology  Since her evaluation in the emergency department, she has had a decrease in her flank pain, abdominal pain  She denies lower urinary tract symptoms  She denies passage of stone in her urinary stream per her knowledge but has been asymptomatic for quite some time  She currently denies dysuria, hematuria, urinary frequency and urgency  He denies fever and chills  Review of Systems   Constitutional: Negative for chills and fever  Respiratory: Negative for cough and shortness of breath  Cardiovascular: Negative for chest pain  Gastrointestinal: Negative for abdominal distention, abdominal pain, blood in stool, nausea and vomiting  Genitourinary: Negative for difficulty urinating, dysuria, enuresis, flank pain, frequency, hematuria and urgency  Musculoskeletal: Negative for back pain  Skin: Negative for rash  Neurological: Negative for dizziness       Past Medical History  Past Medical History:   Diagnosis Date    Anxiety     Hematuria     Hypertension     Irregular heart beat        Past Social History  Past Surgical History:   Procedure Laterality Date    BILATERAL SALPINGOOPHORECTOMY      last assessed 8/11/14    BREAST BIOPSY Left     EXCISIONAL BIOPSY BENIGN    CHOLECYSTECTOMY  2009    HYSTERECTOMY  2004    KNEE SURGERY      OOPHORECTOMY Bilateral 2004     Social History     Tobacco Use   Smoking Status Never Smoker   Smokeless Tobacco Never Used       Past Family History  Family History   Problem Relation Age of Onset    Arthritis Mother     Irregular heart beat Mother     Hypertension Mother     Hyperlipidemia Mother    [facility] Father         bladder    Stroke Father     Arthritis Father     ROOPA disease Sister     Ulcerative colitis Daughter     No Known Problems Son     Breast cancer Maternal Grandmother [de-identified]    No Known Problems Maternal Grandfather     Dementia Paternal Grandmother     Obesity Paternal Grandmother     Emphysema Paternal Grandfather     Heart attack Paternal Grandfather     ROOPA disease Sister     Breast cancer Maternal Aunt 79    Breast cancer Maternal Aunt 79    Uterine cancer Cousin        Past Social history  Social History     Socioeconomic History    Marital status: /Civil Union     Spouse name: Not on file    Number of children: Not on file    Years of education: Not on file    Highest education level: Not on file   Occupational History    Not on file   Social Needs    Financial resource strain: Not on file    Food insecurity     Worry: Not on file Inability: Not on file    Transportation needs     Medical: Not on file     Non-medical: Not on file   Tobacco Use    Smoking status: Never Smoker    Smokeless tobacco: Never Used   Substance and Sexual Activity    Alcohol use: No    Drug use: No    Sexual activity: Yes     Partners: Male   Lifestyle    Physical activity     Days per week: Not on file     Minutes per session: Not on file    Stress: Not on file   Relationships    Social connections     Talks on phone: Not on file     Gets together: Not on file     Attends Jehovah's witness service: Not on file     Active member of club or organization: Not on file     Attends meetings of clubs or organizations: Not on file     Relationship status: Not on file    Intimate partner violence     Fear of current or ex partner: Not on file     Emotionally abused: Not on file     Physically abused: Not on file     Forced sexual activity: Not on file   Other Topics Concern    Not on file   Social History Narrative    Not on file       Current Medications  Current Outpatient Medications   Medication Sig Dispense Refill    ALPRAZolam (XANAX) 0 5 mg tablet Take by mouth as needed        amLODIPine-benazepril (LOTREL 5-10) 5-10 MG per capsule       Calcium Citrate-Vitamin D (CALCIUM CITRATE + D) 250-200 MG-UNIT TABS Take 1 tablet by mouth daily      Carboxymethylcellul-Glycerin (Refresh Optive) 1-0 9 % GEL       Cephalexin 500 MG tablet Take 4 tablets by mouth      cyclobenzaprine (FLEXERIL) 5 mg tablet Take 5 mg by mouth daily at bedtime      diflunisal (DOLOBID) 500 mg tablet       estradiol (ESTRACE) 0 1 mg/g vaginal cream INSERT 1 G INTO THE VAGINA 2 (TWO) TIMES A WEEK 42 5 g 3    Lifitegrast (XIIDRA OP) Apply to eye      meloxicam (MOBIC) 7 5 mg tablet Take 1 tablet by mouth daily as needed      metoprolol succinate (TOPROL-XL) 25 mg 24 hr tablet TAKE 1 TABLET BY MOUTH EVERY DAY 90 tablet 0    metoprolol succinate (TOPROL-XL) 25 mg 24 hr tablet Take 1 tablet (25 mg total) by mouth daily 90 tablet 0    Propylene Glycol (SYSTANE BALANCE OP) Apply to eye       No current facility-administered medications for this visit  Allergies  No Known Allergies      The following portions of the patient's history were reviewed and updated as appropriate: allergies, current medications, past medical history, past social history, past surgical history and problem list       Vitals  Vitals:    06/09/21 1307   BP: 130/82   Pulse: 72   Weight: 73 5 kg (162 lb)   Height: 5' 1 5" (1 562 m)           Physical Exam  Physical Exam  Vitals signs reviewed  Constitutional:       General: She is not in acute distress  Appearance: Normal appearance  Eyes:      Pupils: Pupils are equal, round, and reactive to light  Cardiovascular:      Rate and Rhythm: Normal rate and regular rhythm  Heart sounds: Normal heart sounds  Pulmonary:      Effort: Pulmonary effort is normal  No respiratory distress  Breath sounds: Normal breath sounds  Abdominal:      Tenderness: There is no right CVA tenderness or left CVA tenderness  Musculoskeletal: Normal range of motion  Skin:     General: Skin is warm and dry  Neurological:      General: No focal deficit present  Mental Status: She is alert  Psychiatric:         Mood and Affect: Mood normal          Behavior: Behavior normal        Results  No results found for this or any previous visit (from the past 1 hour(s))  ]  No results found for: PSA  Lab Results   Component Value Date    GLUCOSE 83 04/28/2015    CALCIUM 9 3 05/01/2021     04/28/2015    K 4 6 05/01/2021    CO2 26 05/01/2021     (H) 05/01/2021    BUN 13 05/01/2021    CREATININE 0 88 05/01/2021     Lab Results   Component Value Date    WBC 13 13 (H) 05/09/2015    HGB 9 0 (L) 05/09/2015    HCT 28 2 (L) 05/09/2015    MCV 94 05/09/2015     05/09/2015           Orders  No orders of the defined types were placed in this encounter        Rubi Chandler CRNP

## 2021-07-17 DIAGNOSIS — I10 ESSENTIAL HYPERTENSION: ICD-10-CM

## 2021-07-20 RX ORDER — METOPROLOL SUCCINATE 25 MG/1
TABLET, EXTENDED RELEASE ORAL
Qty: 90 TABLET | Refills: 1 | Status: SHIPPED | OUTPATIENT
Start: 2021-07-20 | End: 2021-08-24

## 2021-07-29 DIAGNOSIS — N95.2 VAGINAL ATROPHY: ICD-10-CM

## 2021-07-29 RX ORDER — ESTRADIOL 0.1 MG/G
1 CREAM VAGINAL 2 TIMES WEEKLY
Qty: 42.5 G | Refills: 3 | Status: SHIPPED | OUTPATIENT
Start: 2021-07-29 | End: 2022-06-08

## 2021-08-07 ENCOUNTER — HOSPITAL ENCOUNTER (OUTPATIENT)
Dept: RADIOLOGY | Age: 66
Discharge: HOME | End: 2021-08-07
Payer: COMMERCIAL

## 2021-08-07 DIAGNOSIS — N95.1 MENOPAUSAL STATE: ICD-10-CM

## 2021-08-07 PROCEDURE — 77080 DXA BONE DENSITY AXIAL: CPT

## 2021-08-10 ENCOUNTER — TELEPHONE (OUTPATIENT)
Dept: OTHER | Facility: OTHER | Age: 66
End: 2021-08-10

## 2021-08-10 NOTE — TELEPHONE ENCOUNTER
Patient had a dexa scan on August 7th  She would like to let Monty Langley know they are there to review and would like to know if patient needs to restart Calcium or to stay off of it

## 2021-08-11 NOTE — TELEPHONE ENCOUNTER
Spoke to patient regarding her if she is able to restart the Calcium  Advised patient her Sydney Prabhakar, he would like her to follow up with her PCP just to clarify if she is able to restart the CA or not  Patient is agreeable

## 2021-08-22 DIAGNOSIS — I10 ESSENTIAL HYPERTENSION: ICD-10-CM

## 2021-08-24 RX ORDER — METOPROLOL SUCCINATE 25 MG/1
TABLET, EXTENDED RELEASE ORAL
Qty: 90 TABLET | Refills: 1 | Status: SHIPPED | OUTPATIENT
Start: 2021-08-24 | End: 2022-02-28

## 2021-11-17 ENCOUNTER — ANNUAL EXAM (OUTPATIENT)
Dept: OBGYN CLINIC | Facility: CLINIC | Age: 66
End: 2021-11-17
Payer: COMMERCIAL

## 2021-11-17 VITALS
HEIGHT: 61 IN | SYSTOLIC BLOOD PRESSURE: 160 MMHG | WEIGHT: 161.8 LBS | BODY MASS INDEX: 30.55 KG/M2 | DIASTOLIC BLOOD PRESSURE: 98 MMHG

## 2021-11-17 DIAGNOSIS — L29.2 VULVAR ITCHING: ICD-10-CM

## 2021-11-17 DIAGNOSIS — R19.4 CHANGE IN BOWEL HABITS: ICD-10-CM

## 2021-11-17 DIAGNOSIS — N81.10 FEMALE BLADDER PROLAPSE: ICD-10-CM

## 2021-11-17 DIAGNOSIS — Z01.419 ENCOUNTER FOR ANNUAL ROUTINE GYNECOLOGICAL EXAMINATION: Primary | ICD-10-CM

## 2021-11-17 DIAGNOSIS — Z12.31 ENCOUNTER FOR SCREENING MAMMOGRAM FOR MALIGNANT NEOPLASM OF BREAST: ICD-10-CM

## 2021-11-17 PROCEDURE — S0612 ANNUAL GYNECOLOGICAL EXAMINA: HCPCS | Performed by: PHYSICIAN ASSISTANT

## 2021-11-17 RX ORDER — BIOTIN 1 MG
2 TABLET ORAL
COMMUNITY

## 2021-11-17 RX ORDER — CARBOXYMETHYLCELLULOSE SODIUM 10 MG/ML
GEL OPHTHALMIC
COMMUNITY

## 2021-12-04 ENCOUNTER — HOSPITAL ENCOUNTER (OUTPATIENT)
Dept: RADIOLOGY | Age: 66
Discharge: HOME | End: 2021-12-04
Payer: COMMERCIAL

## 2021-12-04 VITALS — WEIGHT: 161 LBS | BODY MASS INDEX: 30.4 KG/M2 | HEIGHT: 61 IN

## 2021-12-04 DIAGNOSIS — Z12.31 ENCOUNTER FOR SCREENING MAMMOGRAM FOR MALIGNANT NEOPLASM OF BREAST: ICD-10-CM

## 2021-12-04 PROCEDURE — 77063 BREAST TOMOSYNTHESIS BI: CPT

## 2021-12-04 PROCEDURE — 77067 SCR MAMMO BI INCL CAD: CPT

## 2022-02-24 DIAGNOSIS — I10 ESSENTIAL HYPERTENSION: ICD-10-CM

## 2022-02-28 RX ORDER — METOPROLOL SUCCINATE 25 MG/1
TABLET, EXTENDED RELEASE ORAL
Qty: 90 TABLET | Refills: 1 | Status: SHIPPED | OUTPATIENT
Start: 2022-02-28

## 2022-05-31 ENCOUNTER — APPOINTMENT (OUTPATIENT)
Dept: RADIOLOGY | Age: 67
End: 2022-05-31
Payer: COMMERCIAL

## 2022-05-31 ENCOUNTER — HOSPITAL ENCOUNTER (OUTPATIENT)
Dept: RADIOLOGY | Age: 67
Discharge: HOME | End: 2022-05-31
Payer: COMMERCIAL

## 2022-05-31 DIAGNOSIS — N20.0 NEPHROLITHIASIS: ICD-10-CM

## 2022-05-31 PROCEDURE — 76770 US EXAM ABDO BACK WALL COMP: CPT

## 2022-05-31 PROCEDURE — 74018 RADEX ABDOMEN 1 VIEW: CPT

## 2022-06-09 RX ORDER — ESCITALOPRAM OXALATE 10 MG/1
10 TABLET ORAL DAILY
COMMUNITY
Start: 2022-04-11

## 2022-06-13 ENCOUNTER — OFFICE VISIT (OUTPATIENT)
Dept: UROLOGY | Facility: AMBULATORY SURGERY CENTER | Age: 67
End: 2022-06-13
Payer: COMMERCIAL

## 2022-06-13 VITALS
BODY MASS INDEX: 29.83 KG/M2 | HEIGHT: 61 IN | DIASTOLIC BLOOD PRESSURE: 80 MMHG | SYSTOLIC BLOOD PRESSURE: 124 MMHG | HEART RATE: 62 BPM | WEIGHT: 158 LBS

## 2022-06-13 DIAGNOSIS — N20.0 NEPHROLITHIASIS: Primary | ICD-10-CM

## 2022-06-13 PROCEDURE — 99214 OFFICE O/P EST MOD 30 MIN: CPT | Performed by: NURSE PRACTITIONER

## 2022-06-13 NOTE — PROGRESS NOTES
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 with unremarkable changes  · Will repeat ultrasound of kidney and bladder in 1 year  · We discussed dietary behavior modifications reduce stone formation  Patient should patient material provided  · Patient follow-up in the office in 1 year  She is aware to call the office for concerns or questions  ER precautions discussed  History of Present Illness  Irwin Chairez is a 78 y o  female here for follow up evaluation of  nephrolithiasis   Patient reports approximately 3 and half weeks ago noticing trace gross blood with urination and back pain   She was seen by her PCP   And had urine testing performed   Urine testing came back positive for microscopic hematuria   Urine culture was negative for growth  On 05/01/2022 in 1 patient noted increasing back pain, flank pain and left abdominal cramping and presented to the emergency department for evaluation   On evaluation in the emergency department patient underwent CT stone study with results of a 6 mm stone in the distal ureter at the UVJ with left-sided hydroureteronephrosis noted   She was given medications for medical pulse of therapy and follow-up with Urology  Annamaria Half her evaluation in the emergency department, she has had a decrease in her flank pain, abdominal pain   She denies lower urinary tract symptoms   She denies passage of stone in her urinary stream per her knowledge but has been asymptomatic for quite some time   She currently denies dysuria, hematuria, urinary frequency and urgency   He denies fever and chills  Review of Systems   Constitutional: Negative for chills and fever     Respiratory: Negative for cough and shortness of breath  Cardiovascular: Negative for chest pain  Gastrointestinal: Negative for abdominal distention, abdominal pain, blood in stool, nausea and vomiting  Genitourinary: Negative for difficulty urinating, dysuria, enuresis, flank pain, frequency, hematuria and urgency  Skin: Negative for rash       Past Medical History  Past Medical History:   Diagnosis Date    Anxiety     Hematuria     Hypertension     Irregular heart beat        Past Social History  Past Surgical History:   Procedure Laterality Date    BILATERAL SALPINGOOPHORECTOMY      last assessed 8/11/14    BREAST BIOPSY Left     EXCISIONAL BIOPSY BENIGN    CHOLECYSTECTOMY  2009    HYSTERECTOMY  2004    KNEE SURGERY      OOPHORECTOMY Bilateral 2004     Social History     Tobacco Use   Smoking Status Never Smoker   Smokeless Tobacco Never Used       Past Family History  Family History   Problem Relation Age of Onset    Arthritis Mother     Irregular heart beat Mother     Hypertension Mother     Hyperlipidemia Mother    Elva [employer] Cancer Father         bladder    Stroke Father     Arthritis Father     ROOPA disease Sister     Ulcerative colitis Daughter     No Known Problems Son     Breast cancer Maternal Grandmother [de-identified]    No Known Problems Maternal Grandfather     Dementia Paternal Grandmother     Obesity Paternal Grandmother     Emphysema Paternal Grandfather     Heart attack Paternal Grandfather     ROOPA disease Sister     Breast cancer Maternal Aunt 79    Breast cancer Maternal Aunt 79    No Known Problems Maternal Aunt     Uterine cancer Cousin 36    No Known Problems Paternal Aunt     No Known Problems Paternal Aunt        Past Social history  Social History     Socioeconomic History    Marital status: /Civil Union     Spouse name: Not on file    Number of children: Not on file    Years of education: Not on file    Highest education level: Not on file   Occupational History    Not on file Tobacco Use    Smoking status: Never Smoker    Smokeless tobacco: Never Used   Vaping Use    Vaping Use: Never used   Substance and Sexual Activity    Alcohol use: No    Drug use: No    Sexual activity: Not Currently     Partners: Male     Birth control/protection: Female Sterilization   Other Topics Concern    Not on file   Social History Narrative    Not on file     Social Determinants of Health     Financial Resource Strain: Not on file   Food Insecurity: Not on file   Transportation Needs: Not on file   Physical Activity: Not on file   Stress: Not on file   Social Connections: Not on file   Intimate Partner Violence: Not on file   Housing Stability: Not on file       Current Medications  Current Outpatient Medications   Medication Sig Dispense Refill    amLODIPine-benazepril (LOTREL 5-10) 5-10 MG per capsule       Artificial Tear Ointment (DRY EYES OP) Apply to eye OPTASE      Cholecalciferol (Vitamin D3) 25 MCG (1000 UT) CAPS Take 2 capsules by mouth      DHA-EPA-Flaxseed Oil-Vitamin E (THERA TEARS) CAPS Take by mouth      escitalopram (LEXAPRO) 10 mg tablet Take 10 mg by mouth daily      estradiol (ESTRACE) 0 1 mg/g vaginal cream INSERT 1 G INTO THE VAGINA 2 (TWO) TIMES A WEEK 42 5 g 1    glycerin-hypromellose- (ARTIFICIAL TEARS) 0 2-0 2-1 % SOLN Optase OTC UAD      meloxicam (MOBIC) 7 5 mg tablet Take 1 tablet by mouth daily as needed      metoprolol succinate (TOPROL-XL) 25 mg 24 hr tablet TAKE 1 TABLET BY MOUTH EVERY DAY 90 tablet 0    metoprolol succinate (TOPROL-XL) 25 mg 24 hr tablet TAKE 1 TABLET BY MOUTH EVERY DAY 90 tablet 1    ALPRAZolam (XANAX) 0 5 mg tablet Take by mouth as needed   (Patient not taking: Reported on 6/13/2022)      Calcium Citrate-Vitamin D 250-200 MG-UNIT TABS Take 1 tablet by mouth daily      Carboxymethylcellul-Glycerin (Refresh Optive) 1-0 9 % GEL       Cephalexin 500 MG tablet Take 4 tablets by mouth (Patient not taking: Reported on 6/13/2022)  cyclobenzaprine (FLEXERIL) 5 mg tablet Take 5 mg by mouth daily at bedtime      diflunisal (DOLOBID) 500 mg tablet       Lifitegrast (XIIDRA OP) Apply to eye      metoprolol succinate (TOPROL-XL) 25 mg 24 hr tablet Take 1 tablet (25 mg total) by mouth daily 90 tablet 0    nystatin-triamcinolone (MYCOLOG-II) cream Apply topically 2 (two) times a day (Patient not taking: Reported on 6/13/2022) 60 g 2    Propylene Glycol (SYSTANE BALANCE OP) Apply to eye       No current facility-administered medications for this visit  Allergies  No Known Allergies      The following portions of the patient's history were reviewed and updated as appropriate: allergies, current medications, past medical history, past social history, past surgical history and problem list       Vitals  Vitals:    06/13/22 1039   BP: 124/80   Pulse: 62   Weight: 71 7 kg (158 lb)   Height: 5' 1" (1 549 m)           Physical Exam  Physical Exam  Vitals reviewed  Constitutional:       General: She is not in acute distress  Appearance: Normal appearance  Cardiovascular:      Heart sounds: Normal heart sounds  Pulmonary:      Effort: Pulmonary effort is normal  No respiratory distress  Breath sounds: Normal breath sounds  Abdominal:      Tenderness: There is no right CVA tenderness or left CVA tenderness  Musculoskeletal:         General: Normal range of motion  Skin:     General: Skin is warm and dry  Neurological:      General: No focal deficit present  Mental Status: She is alert  Psychiatric:         Mood and Affect: Mood normal          Behavior: Behavior normal            Results  No results found for this or any previous visit (from the past 1 hour(s))  ]  No results found for: PSA  Lab Results   Component Value Date    GLUCOSE 83 04/28/2015    CALCIUM 9 3 05/01/2021     04/28/2015    K 4 6 05/01/2021    CO2 26 05/01/2021     (H) 05/01/2021    BUN 13 05/01/2021    CREATININE 0 88 05/01/2021 Lab Results   Component Value Date    WBC 13 13 (H) 05/09/2015    HGB 9 0 (L) 05/09/2015    HCT 28 2 (L) 05/09/2015    MCV 94 05/09/2015     05/09/2015     ABDOMEN     INDICATION:   N20 0: Calculus of kidney      COMPARISON:  Ultrasound 5/31/2022 and plain film 5/26/2021     VIEWS:  AP supine        FINDINGS:     There is a nonobstructive bowel gas pattern      No discernible free air on this supine study  Upright or left lateral decubitus imaging is more sensitive to detect subtle free air in the appropriate setting      No pathologic calcifications or soft tissue masses       Visualized lung bases are clear      Right upper quadrant surgical clips are present      There is arthritis in the lower lumbar spine and at the symphysis pubis      IMPRESSION:     No visible calculi     RENAL ULTRASOUND     INDICATION:   N20 0: Calculus of kidney      COMPARISON: Renal ultrasound 5/26/2021  CT abdomen pelvis 5/1/2021     TECHNIQUE:   Ultrasound of the retroperitoneum was performed with a curvilinear transducer utilizing volumetric sweeps and still imaging techniques       FINDINGS:     KIDNEYS:  Symmetric and normal size  Right kidney:  10 2 x 4 3 x 4 6 cm  Volume 107 4 mL  Left kidney:  11 1 x 5 3 x 5 0 cm  Volume 153 8 mL     Right kidney  Normal echogenicity and contour  Round hyperechoic focus seen within the lower pole measuring 0 7 x 0 7 x 0 6 cm without twinkle artifact or posterior shadowing  This is not significantly changed in size from prior renal ultrasound of May 2021 when accounting for differences in   measuring technique  No hydronephrosis  No shadowing calculi  No perinephric fluid collections      Left kidney  Normal echogenicity and contour  Simple appearing cortical cyst measures 1 3 cm within the midpole  Rounded echogenic focus within the midpole measures 0 4 x 0 3 x 0 3 cm  No hydronephrosis    Hyperechoic focus is seen within the lower pole measuring 2 mm with twinkle artifact and questionable shadowing  No perinephric fluid collections      URETERS:  Nonvisualized      BLADDER:   Normally distended  No focal thickening or mass lesions  Bilateral ureteral jets detected         IMPRESSION:  1  No hydronephrosis  2   Hyperechoic focus within the lower pole of the left kidney measuring 2 mm with twinkle artifact, likely corresponding to small nonobstructing calculus seen on prior CT  3   Stable round echogenic lesion within the lower pole of the right kidney measuring 0 7 cm which corresponds to angiomyolipoma demonstrated on prior CT  Additional 4 mm echogenic focus within the left kidney is retrospectively stable from prior   ultrasound and may represent an additional small angiomyolipoma  Orders  Orders Placed This Encounter   Procedures    US kidney and bladder     Standing Status:   Future     Standing Expiration Date:   6/13/2026     Scheduling Instructions:      "Prep required if being scheduled in conjunction with other studies, refer to those examination's Preps first before scheduling  All patients for US Kidney and Bladder they must drink 24 oz of water 60 minutes before your scheduled appointment time  This test requires you to have a FULL bladder  Please do not urinate before your test             Please bring your physician order, insurance cards, a form of photo ID and a list of your medications with you  Arrive 15 minutes prior to your appointment time in order to register  If you need to have lab work or a urinalysis, please do this AFTER your ultrasound  "     Order Specific Question:   Reason for Exam:     Answer:   nephrolithiasis    XR abdomen 1 view kub     Standing Status:   Future     Standing Expiration Date:   6/13/2026     Scheduling Instructions:      Bring along any outside films relating to this procedure               MASHA Page

## 2022-09-30 PROBLEM — K59.1 FUNCTIONAL DIARRHEA: Status: ACTIVE | Noted: 2022-09-30

## 2022-10-07 DIAGNOSIS — I10 ESSENTIAL HYPERTENSION: ICD-10-CM

## 2022-10-10 RX ORDER — METOPROLOL SUCCINATE 25 MG/1
TABLET, EXTENDED RELEASE ORAL
Qty: 90 TABLET | Refills: 1 | Status: SHIPPED | OUTPATIENT
Start: 2022-10-10

## 2022-10-31 ENCOUNTER — LAB REQUISITION (OUTPATIENT)
Dept: LAB | Facility: HOSPITAL | Age: 67
End: 2022-10-31

## 2022-10-31 DIAGNOSIS — K63.5 POLYP OF COLON: ICD-10-CM

## 2022-10-31 DIAGNOSIS — Z86.010 PERSONAL HISTORY OF COLONIC POLYPS: ICD-10-CM

## 2022-10-31 DIAGNOSIS — K57.30 DIVERTICULOSIS OF LARGE INTESTINE WITHOUT PERFORATION OR ABSCESS WITHOUT BLEEDING: ICD-10-CM

## 2022-11-04 NOTE — PROGRESS NOTES
Cardiology Follow Up    Libby Gunter  1955  [id number]  [address]  [phone]  [phone]    1  Essential hypertension     2  PAC (premature atrial contraction)         Interval History:  Patient is here for a follow-up visit  Patient has a history of hypertension and PACs  Echocardiogram done December 2017 demonstrated preserved LVEF of 60% with no significant valvular heart disease  Mild-to-moderate TR was noted      Patient Active Problem List   Diagnosis   • Encounter for gynecological examination (general) (routine) without abnormal findings   • Vaginal atrophy   • Hypertension   • PAC (premature atrial contraction)   • Functional diarrhea     Past Medical History:   Diagnosis Date   • Anxiety    • Hematuria    • Hypertension    • Irregular heart beat      Social History     Socioeconomic History   • Marital status: /Civil Union     Spouse name: Not on file   • Number of children: Not on file   • Years of education: Not on file   • Highest education level: Not on file   Occupational History   • Not on file   Tobacco Use   • Smoking status: Never Smoker   • Smokeless tobacco: Never Used   Vaping Use   • Vaping Use: Never used   Substance and Sexual Activity   • Alcohol use: No   • Drug use: No   • Sexual activity: Not Currently     Partners: Male     Birth control/protection: Female Sterilization   Other Topics Concern   • Not on file   Social History Narrative   • Not on file     Social Determinants of Health     Financial Resource Strain: Not on file   Food Insecurity: Not on file   Transportation Needs: Not on file   Physical Activity: Not on file   Stress: Not on file   Social Connections: Not on file   Intimate Partner Violence: Not on file   Housing Stability: Not on file      Family History   Problem Relation Age of Onset   • Arthritis Mother    • Irregular heart beat Mother • Hypertension Mother    • Hyperlipidemia Mother    • Cancer Father         bladder   • Stroke Father    • Arthritis Father    • ROOPA disease Sister    • Ulcerative colitis Daughter    • No Known Problems Son    • Breast cancer Maternal Grandmother [de-identified]   • No Known Problems Maternal Grandfather    • Dementia Paternal Grandmother    • Obesity Paternal Grandmother    • Emphysema Paternal Grandfather    • Heart attack Paternal Grandfather    • ROOPA disease Sister    • Breast cancer Maternal Aunt 79   • Breast cancer Maternal Aunt 79   • No Known Problems Maternal Aunt    • Uterine cancer Cousin 40   • No Known Problems Paternal Aunt    • No Known Problems Paternal Aunt      Past Surgical History:   Procedure Laterality Date   • BILATERAL SALPINGOOPHORECTOMY      last assessed 8/11/14   • BREAST BIOPSY Left     EXCISIONAL BIOPSY BENIGN   • CHOLECYSTECTOMY  2009   • HYSTERECTOMY  2004   • KNEE SURGERY     • OOPHORECTOMY Bilateral 2004       Current Outpatient Medications:   •  ALPRAZolam (XANAX) 0 5 mg tablet, Take by mouth as needed, Disp: , Rfl:   •  amLODIPine-benazepril (LOTREL 5-10) 5-10 MG per capsule, , Disp: , Rfl:   •  Artificial Tear Ointment (DRY EYES OP), Apply to eye OPTASE, Disp: , Rfl:   •  Calcium Citrate-Vitamin D 250-200 MG-UNIT TABS, Take 1 tablet by mouth daily, Disp: , Rfl:   •  Carboxymethylcellul-Glycerin (Refresh Optive) 1-0 9 % GEL, , Disp: , Rfl:   •  Cephalexin 500 MG tablet, Take 4 tablets by mouth, Disp: , Rfl:   •  Cholecalciferol (Vitamin D3) 25 MCG (1000 UT) CAPS, Take 2 capsules by mouth, Disp: , Rfl:   •  cholestyramine (QUESTRAN) 4 g packet, Take 1 packet (4 g total) by mouth 3 (three) times a day with meals, Disp: 5400 each, Rfl: 6  •  cyclobenzaprine (FLEXERIL) 5 mg tablet, Take 5 mg by mouth daily at bedtime, Disp: , Rfl:   •  DHA-EPA-Flaxseed Oil-Vitamin E (THERA TEARS) CAPS, Take by mouth, Disp: , Rfl:   •  diflunisal (DOLOBID) 500 mg tablet, , Disp: , Rfl:   •  escitalopram (LEXAPRO) 10 mg tablet, Take 10 mg by mouth daily, Disp: , Rfl:   •  estradiol (ESTRACE) 0 1 mg/g vaginal cream, INSERT 1 G INTO THE VAGINA 2 (TWO) TIMES A WEEK, Disp: 42 5 g, Rfl: 1  •  glycerin-hypromellose- (ARTIFICIAL TEARS) 0 2-0 2-1 % SOLN, Optase OTC UAD, Disp: , Rfl:   •  Lifitegrast (XIIDRA OP), Apply to eye, Disp: , Rfl:   •  meloxicam (MOBIC) 7 5 mg tablet, Take 1 tablet by mouth daily as needed, Disp: , Rfl:   •  metoprolol succinate (TOPROL-XL) 25 mg 24 hr tablet, TAKE 1 TABLET BY MOUTH EVERY DAY, Disp: 90 tablet, Rfl: 0  •  metoprolol succinate (TOPROL-XL) 25 mg 24 hr tablet, Take 1 tablet (25 mg total) by mouth daily, Disp: 90 tablet, Rfl: 0  •  metoprolol succinate (TOPROL-XL) 25 mg 24 hr tablet, TAKE 1 TABLET BY MOUTH EVERY DAY, Disp: 90 tablet, Rfl: 1  •  nystatin-triamcinolone (MYCOLOG-II) cream, Apply topically 2 (two) times a day, Disp: 60 g, Rfl: 2  •  Propylene Glycol (SYSTANE BALANCE OP), Apply to eye, Disp: , Rfl:   No Known Allergies    Labs:not applicable  Imaging: No results found  Review of Systems:  Review of Systems   All other systems reviewed and are negative  Physical Exam:  There were no vitals taken for this visit  Physical Exam  Vitals reviewed  Constitutional:       Appearance: She is well-developed  HENT:      Head: Normocephalic and atraumatic  Eyes:      Conjunctiva/sclera: Conjunctivae normal       Pupils: Pupils are equal, round, and reactive to light  Cardiovascular:      Rate and Rhythm: Normal rate  Heart sounds: Normal heart sounds  Pulmonary:      Effort: Pulmonary effort is normal       Breath sounds: Normal breath sounds  Musculoskeletal:      Cervical back: Normal range of motion and neck supple  Skin:     General: Skin is warm and dry  Neurological:      Mental Status: She is alert and oriented to person, place, and time           Discussion/Summary:***

## 2022-11-14 ENCOUNTER — OFFICE VISIT (OUTPATIENT)
Dept: CARDIOLOGY CLINIC | Facility: CLINIC | Age: 67
End: 2022-11-14

## 2022-11-14 VITALS
HEIGHT: 61 IN | HEART RATE: 61 BPM | SYSTOLIC BLOOD PRESSURE: 126 MMHG | BODY MASS INDEX: 29.87 KG/M2 | WEIGHT: 158.2 LBS | DIASTOLIC BLOOD PRESSURE: 76 MMHG

## 2022-11-14 DIAGNOSIS — I10 PRIMARY HYPERTENSION: Primary | ICD-10-CM

## 2022-11-14 DIAGNOSIS — I49.1 PAC (PREMATURE ATRIAL CONTRACTION): ICD-10-CM

## 2022-11-14 NOTE — PROGRESS NOTES
Cardiology Follow Up    Emanuel Sommer  1955  [id number]  [address]  [phone]  [phone]    1  Primary hypertension     2  PAC (premature atrial contraction)         Interval History:  Cardiology follow-up  Patient was last seen on 11/20  Patient is otherwise in a couple of units and then chest pain and dyspnea, rapid palpitations, mostly at night and when she overdoes it with caffeine intake  No syncope or presyncope  Stable pattern  Comply with low-salt diet, blood pressures been well controlled  She expects to retire next year which will improve her level of  stress, she is not exercising regularly      Patient Active Problem List   Diagnosis   • Encounter for gynecological examination (general) (routine) without abnormal findings   • Vaginal atrophy   • Hypertension   • PAC (premature atrial contraction)   • Functional diarrhea     Past Medical History:   Diagnosis Date   • Anxiety    • Hematuria    • Hypertension    • Irregular heart beat      Social History     Socioeconomic History   • Marital status: /Civil Union     Spouse name: Not on file   • Number of children: Not on file   • Years of education: Not on file   • Highest education level: Not on file   Occupational History   • Not on file   Tobacco Use   • Smoking status: Never Smoker   • Smokeless tobacco: Never Used   Vaping Use   • Vaping Use: Never used   Substance and Sexual Activity   • Alcohol use: No   • Drug use: No   • Sexual activity: Not Currently     Partners: Male     Birth control/protection: Female Sterilization   Other Topics Concern   • Not on file   Social History Narrative   • Not on file     Social Determinants of Health     Financial Resource Strain: Not on file   Food Insecurity: Not on file   Transportation Needs: Not on file   Physical Activity: Not on file   Stress: Not on file   Social Connections: Not on file   Intimate Partner Violence: Not on file   Housing Stability: Not on file      Family History   Problem Relation Age of Onset   • Arthritis Mother    • Irregular heart beat Mother    • Hypertension Mother    • Hyperlipidemia Mother    • Cancer Father         bladder   • Stroke Father    • Arthritis Father    • ROOPA disease Sister    • Ulcerative colitis Daughter    • No Known Problems Son    • Breast cancer Maternal Grandmother [de-identified]   • No Known Problems Maternal Grandfather    • Dementia Paternal Grandmother    • Obesity Paternal Grandmother    • Emphysema Paternal Grandfather    • Heart attack Paternal Grandfather    • ROOPA disease Sister    • Breast cancer Maternal Aunt 79   • Breast cancer Maternal Aunt 79   • No Known Problems Maternal Aunt    • Uterine cancer Cousin 40   • No Known Problems Paternal Aunt    • No Known Problems Paternal Aunt      Past Surgical History:   Procedure Laterality Date   • BILATERAL SALPINGOOPHORECTOMY      last assessed 8/11/14   • BREAST BIOPSY Left     EXCISIONAL BIOPSY BENIGN   • CHOLECYSTECTOMY  2009   • HYSTERECTOMY  2004   • KNEE SURGERY     • OOPHORECTOMY Bilateral 2004       Current Outpatient Medications:   •  ALPRAZolam (XANAX) 0 5 mg tablet, Take by mouth as needed, Disp: , Rfl:   •  amLODIPine-benazepril (LOTREL 5-10) 5-10 MG per capsule, , Disp: , Rfl:   •  Carboxymethylcellul-Glycerin (Refresh Optive) 1-0 9 % GEL, , Disp: , Rfl:   •  Cephalexin 500 MG tablet, Take 4 tablets by mouth, Disp: , Rfl:   •  Cholecalciferol (Vitamin D3) 25 MCG (1000 UT) CAPS, Take 2 capsules by mouth, Disp: , Rfl:   •  DHA-EPA-Flaxseed Oil-Vitamin E (THERA TEARS) CAPS, Take by mouth, Disp: , Rfl:   •  escitalopram (LEXAPRO) 10 mg tablet, Take 10 mg by mouth daily, Disp: , Rfl:   •  estradiol (ESTRACE) 0 1 mg/g vaginal cream, INSERT 1 G INTO THE VAGINA 2 (TWO) TIMES A WEEK, Disp: 42 5 g, Rfl: 1  •  meloxicam (MOBIC) 7 5 mg tablet, Take 1 tablet by mouth daily as needed, Disp: , Rfl:   •  metoprolol succinate (TOPROL-XL) 25 mg 24 hr tablet, TAKE 1 TABLET BY MOUTH EVERY DAY, Disp: 90 tablet, Rfl: 0  •  nystatin-triamcinolone (MYCOLOG-II) cream, Apply topically 2 (two) times a day, Disp: 60 g, Rfl: 2  •  Artificial Tear Ointment (DRY EYES OP), Apply to eye OPTASE (Patient not taking: Reported on 11/14/2022), Disp: , Rfl:   •  Calcium Citrate-Vitamin D 250-200 MG-UNIT TABS, Take 1 tablet by mouth daily (Patient not taking: Reported on 11/14/2022), Disp: , Rfl:   •  cholestyramine (QUESTRAN) 4 g packet, Take 1 packet (4 g total) by mouth 3 (three) times a day with meals (Patient not taking: Reported on 11/14/2022), Disp: 5400 each, Rfl: 6  •  cyclobenzaprine (FLEXERIL) 5 mg tablet, Take 5 mg by mouth daily at bedtime (Patient not taking: Reported on 11/14/2022), Disp: , Rfl:   •  diflunisal (DOLOBID) 500 mg tablet, , Disp: , Rfl:   •  glycerin-hypromellose- (ARTIFICIAL TEARS) 0 2-0 2-1 % SOLN, Optase OTC UAD (Patient not taking: Reported on 11/14/2022), Disp: , Rfl:   •  Lifitegrast (Dayna Den OP), Apply to eye (Patient not taking: Reported on 11/14/2022), Disp: , Rfl:   •  metoprolol succinate (TOPROL-XL) 25 mg 24 hr tablet, Take 1 tablet (25 mg total) by mouth daily (Patient not taking: Reported on 11/14/2022), Disp: 90 tablet, Rfl: 0  •  metoprolol succinate (TOPROL-XL) 25 mg 24 hr tablet, TAKE 1 TABLET BY MOUTH EVERY DAY (Patient not taking: Reported on 11/14/2022), Disp: 90 tablet, Rfl: 1  •  Propylene Glycol (SYSTANE BALANCE OP), Apply to eye (Patient not taking: Reported on 11/14/2022), Disp: , Rfl:   No Known Allergies    Labs:  Lab Requisition on 10/31/2022   Component Date Value   • Case Report 10/31/2022                      Value:Surgical Pathology Report                         Case: [id number]                                   Authorizing Provider:  Dianna Sol MD       Collected:           10/31/2022 1036              Ordering Location:     [address] [place] [address] Received:            10/31/2022 [address] [place] [address] Specialty                                                                                  Laboratory                                                                   Pathologist:           Anna Seals MD                                                           Specimens:   A) - Colon, Random                                                                                  B) - Polyp, Colorectal, Sigmoid colon                                                     • Final Diagnosis 10/31/2022                      Value: This result contains rich text formatting which cannot be displayed here  • Additional Information 10/31/2022                      Value: This result contains rich text formatting which cannot be displayed here  • Synoptic Checklist 10/31/2022                      Value:                            COLON/RECTUM POLYP FORM - GI - B                                                                                     :    Other     • Gross Description 10/31/2022                      Value: This result contains rich text formatting which cannot be displayed here  Imaging: No results found  Review of Systems:  Review of Systems   Constitutional: Positive for fatigue  Respiratory: Negative for apnea, shortness of breath, wheezing and stridor  Cardiovascular: Positive for palpitations  Negative for chest pain and leg swelling  Neurological: Negative for dizziness, syncope and numbness  Psychiatric/Behavioral: Positive for sleep disturbance  The patient is nervous/anxious  Physical Exam:  Physical Exam  Vitals reviewed  Constitutional:       General: She is not in acute distress  Appearance: Normal appearance  She is not ill-appearing, toxic-appearing or diaphoretic  Eyes:      General: No scleral icterus  Neck:      Vascular: No carotid bruit     Cardiovascular:      Rate and Rhythm: Normal rate and regular rhythm  Pulses: Normal pulses  Heart sounds: Normal heart sounds  No murmur heard  No friction rub  No gallop  Comments: Frequent extra systoles  Pulmonary:      Effort: Pulmonary effort is normal  No respiratory distress  Breath sounds: Normal breath sounds  No stridor  No wheezing, rhonchi or rales  Neurological:      Mental Status: She is alert  Psychiatric:         Mood and Affect: Mood normal          Discussion/Summary:  Palpitations , history of moderate supraventricular ectopic activity no complex  No structural heart disease by clinical examination and noninvasive assessment  Previous stress test 5 years ago she did 9 minutes of Fredrick protocol with no echocardiographic criteria for ischemia or EKG echocardiogram was also normal   Will repeat a Holter monitor  Regular exercise and stress management recommended  This note was completed in part utilizing [employer] direct voice recognition software  Grammatical errors, random word insertion, spelling mistakes, and incomplete sentences may be an occasional consequence of the system secondary to software limitations, ambient noise and hardware issues  At the time of dictation, efforts were made to edit, clarify and /or correct errors  Please read the chart carefully and recognize, using context, where substitutions have occurred  If you have any questions or concerns about the context, text or information contained within the body of this dictation, please contact myself, the provider, for further clarification

## 2022-11-15 ENCOUNTER — HOSPITAL ENCOUNTER (OUTPATIENT)
Dept: NON INVASIVE DIAGNOSTICS | Facility: CLINIC | Age: 67
Discharge: HOME | End: 2022-11-15

## 2022-11-15 DIAGNOSIS — I10 PRIMARY HYPERTENSION: ICD-10-CM

## 2022-11-15 DIAGNOSIS — I49.1 PAC (PREMATURE ATRIAL CONTRACTION): ICD-10-CM

## 2022-11-23 ENCOUNTER — ANNUAL EXAM (OUTPATIENT)
Dept: OBGYN CLINIC | Facility: CLINIC | Age: 67
End: 2022-11-23

## 2022-11-23 VITALS
SYSTOLIC BLOOD PRESSURE: 118 MMHG | WEIGHT: 159.2 LBS | BODY MASS INDEX: 30.06 KG/M2 | DIASTOLIC BLOOD PRESSURE: 70 MMHG | HEIGHT: 61 IN

## 2022-11-23 DIAGNOSIS — Z78.0 POSTMENOPAUSAL STATUS: ICD-10-CM

## 2022-11-23 DIAGNOSIS — Z13.820 SCREENING FOR OSTEOPOROSIS: ICD-10-CM

## 2022-11-23 DIAGNOSIS — Z01.419 ROUTINE GYNECOLOGICAL EXAMINATION: Primary | ICD-10-CM

## 2022-11-23 NOTE — PROGRESS NOTES
Assessment   78 y o  [id number] who is s/p hysterectomy presenting for annual exam     Plan   Diagnoses and all orders for this visit:    Routine gynecological examination    Postmenopausal status  -     DXA bone density spine hip and pelvis; Future    Screening for osteoporosis  -     DXA bone density spine hip and pelvis; Future          Pap - no longer indicated  Mammo slip given   Colonoscopy due 2027   DEXA due - slip given      RTO one year for yearly exam or sooner as needed  __________________________________________________________________    SCREENING  Last Pap: prior to hysterectomy   Last Mammo: 12/04/2021 BIRADS 2 - Benign  Last Colonoscopy: 10/31/2022 5 year recall  Last DEXA: 8/7/2021 normal    Subjective     Reyna Emilia is a 78 y o  [id number] who is s/p hysterectomy presenting for annual exam  She is without complaintdoes not want STD testing today  Still working in "[employer]  Told her boss she would continue to work through tax season, then would like to move down to part time or per candy  Her daughter is 39 and well, living in [place] with  and dogs  Jeff Silk and her relationship has improved greatly with her daughter being away  Her son, his wife, and 4 grandkids live in [place]  She sees them often and thinks she would like to help out more once she retires! GYN  Denies vaginal bleeding, dryness, vaginal discharge, itching, odor, pelvic pain and vulvar/vaginal symptoms    Menarche at age 7  Menopause at age 51  Hysterectomy with BSO 8/2014 due to heavy bleeding and endometrial polyps   Menopausal symptoms denies    Sexually active: No -- she and her  both report low interest -- thinks she would like to try to make an effort toward being active again and there fore would like to continue estrace therapy  Declines refill at this time  Sexual dysfunction: denies   Hx STI: denies     Hx Abnormal pap: denies  We reviewed ASCCP guidelines for Pap testing today  OB  [id number]         Complaints: denies  Denies change in urinary stream, dysuria, hematuria, urinary frequency/urgency, and leakage     BREAST  Complaints: denies  Denies: breast lump, breast tenderness, nipple discharge, skin color change, and skin lesion(s)  Personal hx: none reported       GENERAL  PMH reviewed/updated and is as below  Patient does follow with a PCP    Exercise: none    Pertinent Family Hx:   Family hx of breast cancer: MGM, maternal aunts  Family hx of ovarian cancer: cousin  Family hx of colon cancer: no    Past Medical History:   Diagnosis Date   • Anxiety    • Hematuria    • Hypertension    • Irregular heart beat    • Kidney stone May 2021       Past Surgical History:   Procedure Laterality Date   • BILATERAL SALPINGOOPHORECTOMY      last assessed 8/11/14   • BREAST BIOPSY Left     EXCISIONAL BIOPSY BENIGN   • CHOLECYSTECTOMY  2009   • COLONOSCOPY  10/31/2022   • HYSTERECTOMY  2004   • KNEE SURGERY     • OOPHORECTOMY Bilateral 2004         Current Outpatient Medications:   •  ALPRAZolam (XANAX) 0 5 mg tablet, Take by mouth as needed, Disp: , Rfl:   •  amLODIPine-benazepril (LOTREL 5-10) 5-10 MG per capsule, , Disp: , Rfl:   •  Carboxymethylcellul-Glycerin (Refresh Optive) 1-0 9 % GEL, , Disp: , Rfl:   •  Cephalexin 500 MG tablet, Take 4 tablets by mouth, Disp: , Rfl:   •  Cholecalciferol (Vitamin D3) 25 MCG (1000 UT) CAPS, Take 2 capsules by mouth, Disp: , Rfl:   •  DHA-EPA-Flaxseed Oil-Vitamin E (THERA TEARS) CAPS, Take by mouth, Disp: , Rfl:   •  escitalopram (LEXAPRO) 10 mg tablet, Take 10 mg by mouth daily, Disp: , Rfl:   •  estradiol (ESTRACE) 0 1 mg/g vaginal cream, INSERT 1 G INTO THE VAGINA 2 (TWO) TIMES A WEEK, Disp: 42 5 g, Rfl: 1  •  meloxicam (MOBIC) 7 5 mg tablet, Take 1 tablet by mouth daily as needed, Disp: , Rfl:   •  metoprolol succinate (TOPROL-XL) 25 mg 24 hr tablet, TAKE 1 TABLET BY MOUTH EVERY DAY, Disp: 90 tablet, Rfl: 1  •  nystatin-triamcinolone (MYCOLOG-II) cream, Apply topically 2 (two) times a day, Disp: 60 g, Rfl: 2  •  Artificial Tear Ointment (DRY EYES OP), Apply to eye OPTASE (Patient not taking: Reported on 11/14/2022), Disp: , Rfl:   •  Calcium Citrate-Vitamin D 250-200 MG-UNIT TABS, Take 1 tablet by mouth daily (Patient not taking: Reported on 11/14/2022), Disp: , Rfl:   •  cholestyramine (QUESTRAN) 4 g packet, Take 1 packet (4 g total) by mouth 3 (three) times a day with meals (Patient not taking: Reported on 11/14/2022), Disp: 5400 each, Rfl: 6  •  cyclobenzaprine (FLEXERIL) 5 mg tablet, Take 5 mg by mouth daily at bedtime (Patient not taking: Reported on 11/14/2022), Disp: , Rfl:   •  diflunisal (DOLOBID) 500 mg tablet, , Disp: , Rfl:   •  glycerin-hypromellose- (ARTIFICIAL TEARS) 0 2-0 2-1 % SOLN, Optase OTC UAD (Patient not taking: Reported on 11/14/2022), Disp: , Rfl:   •  Lifitegrast (Patrisha Ahr OP), Apply to eye (Patient not taking: Reported on 11/14/2022), Disp: , Rfl:   •  metoprolol succinate (TOPROL-XL) 25 mg 24 hr tablet, TAKE 1 TABLET BY MOUTH EVERY DAY (Patient not taking: Reported on 11/23/2022), Disp: 90 tablet, Rfl: 0  •  metoprolol succinate (TOPROL-XL) 25 mg 24 hr tablet, Take 1 tablet (25 mg total) by mouth daily (Patient not taking: Reported on 11/14/2022), Disp: 90 tablet, Rfl: 0  •  Propylene Glycol (SYSTANE BALANCE OP), Apply to eye (Patient not taking: Reported on 11/14/2022), Disp: , Rfl:     No Known Allergies    Social History     Socioeconomic History   • Marital status: /Civil Union     Spouse name: Not on file   • Number of children: Not on file   • Years of education: Not on file   • Highest education level: Not on file   Occupational History   • Not on file   Tobacco Use   • Smoking status: Never   • Smokeless tobacco: Never   Vaping Use   • Vaping Use: Never used   Substance and Sexual Activity   • Alcohol use: No   • Drug use: No   • Sexual activity: Not Currently     Partners: Male     Birth control/protection: Female Sterilization   Other Topics Concern   • Not on file   Social History Narrative   • Not on file     Social Determinants of Health     Financial Resource Strain: Not on file   Food Insecurity: Not on file   Transportation Needs: Not on file   Physical Activity: Not on file   Stress: Not on file   Social Connections: Not on file   Intimate Partner Violence: Not on file   Housing Stability: Not on file         REVIEW OF SYSTEMS  Respiratory: Negative  Cardiovascular: Negative  Breast: Negative, other than as noted above  Gastrointestinal: Negative for abdominal pain, change in bowel habits  Genitourinary: Negative, other than as noted above  OBJECTIVE  Blood pressure 118/70, height 5' 1" (1 549 m), weight 72 2 kg (159 lb 3 2 oz), not currently breastfeeding  Patient appears well and is not in distress  Neck is supple without masses  Breasts are symmetrical without mass, tenderness, nipple discharge, skin changes or adenopathy  Abdomen is soft and nontender without masses  External genitals are normal without lesions or rashes  Urethral meatus and urethra are normal  Vagina is normal without discharge or bleeding  Cervix, uterus, and adnexa are surgically absent    Skin warm and dry   Alert and oriented x 3 with normal affect

## 2022-12-10 ENCOUNTER — HOSPITAL ENCOUNTER (OUTPATIENT)
Dept: RADIOLOGY | Age: 67
Discharge: HOME | End: 2022-12-10

## 2022-12-10 VITALS — HEIGHT: 61 IN | BODY MASS INDEX: 30.02 KG/M2 | WEIGHT: 159 LBS

## 2022-12-10 DIAGNOSIS — Z12.31 SCREENING MAMMOGRAM FOR HIGH-RISK PATIENT: ICD-10-CM

## 2023-01-03 DIAGNOSIS — N95.2 VAGINAL ATROPHY: ICD-10-CM

## 2023-01-04 DIAGNOSIS — N95.2 VAGINAL ATROPHY: ICD-10-CM

## 2023-01-04 RX ORDER — ESTRADIOL 0.1 MG/G
1 CREAM VAGINAL 2 TIMES WEEKLY
Qty: 42.5 G | Refills: 3 | Status: SHIPPED | OUTPATIENT
Start: 2023-01-05

## 2023-01-04 RX ORDER — ESTRADIOL 0.1 MG/G
1 CREAM VAGINAL 2 TIMES WEEKLY
Qty: 42.5 G | Refills: 1 | Status: SHIPPED | OUTPATIENT
Start: 2023-01-05

## 2023-02-19 DIAGNOSIS — I10 ESSENTIAL HYPERTENSION: ICD-10-CM

## 2023-02-20 RX ORDER — METOPROLOL SUCCINATE 25 MG/1
TABLET, EXTENDED RELEASE ORAL
Qty: 90 TABLET | Refills: 1 | Status: SHIPPED | OUTPATIENT
Start: 2023-02-20

## 2023-03-20 NOTE — ASSESSMENT & PLAN NOTE
Exam stable and unchanged   Recommended continued use of estrace and again recommended coconut oil for lubrication with intercourse Thank you for allowing us to participate in your care today!     Please do not hesitate to contact our office with any follow-up questions or concerns. We can be contacted by phone or through your patient portal via the [employer].    JONAS Yusuf  Office hours: Monday - Friday 8 am to 5 pm    [facility] [address], [address]  [place] [address]  Phone: [phone]  Fax: [phone]    For scheduling:  Imaging (CT, x-ray): [phone]  Pulmonary function tests: [phone]  Sleep studies: [phone]

## 2023-06-05 ENCOUNTER — HOSPITAL ENCOUNTER (OUTPATIENT)
Dept: RADIOLOGY | Age: 68
Discharge: HOME | End: 2023-06-05
Payer: COMMERCIAL

## 2023-06-05 ENCOUNTER — APPOINTMENT (OUTPATIENT)
Dept: RADIOLOGY | Age: 68
End: 2023-06-05
Payer: COMMERCIAL

## 2023-06-05 DIAGNOSIS — N20.0 NEPHROLITHIASIS: ICD-10-CM

## 2023-06-05 PROCEDURE — 74018 RADEX ABDOMEN 1 VIEW: CPT

## 2023-06-05 PROCEDURE — 76775 US EXAM ABDO BACK WALL LIM: CPT

## 2023-06-23 ENCOUNTER — OFFICE VISIT (OUTPATIENT)
Dept: UROLOGY | Facility: AMBULATORY SURGERY CENTER | Age: 68
End: 2023-06-23
Payer: COMMERCIAL

## 2023-06-23 VITALS
HEART RATE: 69 BPM | DIASTOLIC BLOOD PRESSURE: 74 MMHG | HEIGHT: 61 IN | SYSTOLIC BLOOD PRESSURE: 138 MMHG | BODY MASS INDEX: 30.21 KG/M2 | WEIGHT: 160 LBS | OXYGEN SATURATION: 98 %

## 2023-06-23 DIAGNOSIS — N20.0 NEPHROLITHIASIS: Primary | ICD-10-CM

## 2023-06-23 DIAGNOSIS — D17.71 ANGIOMYOLIPOMA OF BOTH KIDNEYS: ICD-10-CM

## 2023-06-23 PROCEDURE — 99213 OFFICE O/P EST LOW 20 MIN: CPT | Performed by: UROLOGY

## 2023-06-23 NOTE — PROGRESS NOTES
6/23/2023    Meghann Smith  1955  [id number]        Assessment  History of nephrolithiasis  Bilateral small lesions, likely angiomyolipoma, stable    Plan  We discussed findings and reviewed ultrasound results  Explained angiomyolipoma as stable, benign lesions  Discussed importance of hydration and stone prevention  She'd like to follow-up 1 more time in 1 year with ultrasound to ensure stability, and then consider as needed  History of Present Illness  Severiano Ponder is a 80 y o  female with history of stones in 2021  She has been followed and no recurrence since  She is not good at hydrating  She was found in 2021 to have bilateral small lesions 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 angiomyolipoma  She is been followed annually with ultrasound with no change  No current complaints  KUB does not show stones  Ultrasound does not show stones, no hydronephrosis, bilateral ureteral jets seen  Small bilateral masses are stable for the past 2 years  Review of Systems  Review of Systems   Constitutional: Negative  HENT: Negative  Respiratory: Negative  Cardiovascular: Negative  Gastrointestinal: Negative  Genitourinary:        As per HPI   Musculoskeletal: Negative  Skin: Negative  Neurological: Negative  Hematological: Negative            Past Medical History  Past Medical History:   Diagnosis Date   • Anxiety    • Hematuria    • Hypertension    • Irregular heart beat    • Kidney stone May 2021       Past Social History  Past Surgical History:   Procedure Laterality Date   • BILATERAL SALPINGOOPHORECTOMY      last assessed 8/11/14   • BREAST BIOPSY Left     EXCISIONAL BIOPSY BENIGN   • CHOLECYSTECTOMY  2009   • COLONOSCOPY  10/31/2022   • HYSTERECTOMY  2004   • KNEE SURGERY     • OOPHORECTOMY Bilateral 2004       Past Family History  Family History   Problem Relation Age of Onset   • Arthritis Mother    • Irregular heart beat Mother    • Hypertension Mother    • Hyperlipidemia Mother    • Cancer Father         Bladder - cancer treated  No longer has     • Stroke Father    • Arthritis Father    • ROOPA disease Sister    • Ulcerative colitis Daughter    • No Known Problems Son    • Breast cancer Maternal Grandmother    • No Known Problems Maternal Grandfather    • Dementia Paternal Grandmother    • Obesity Paternal Grandmother    • Emphysema Paternal Grandfather    • Heart attack Paternal Grandfather    • ROOPA disease Sister    • Breast cancer Maternal Aunt 79   • Breast cancer Maternal Aunt 79   • No Known Problems Maternal Aunt    • Uterine cancer Cousin 36   • No Known Problems Paternal Aunt    • No Known Problems Paternal Aunt        Past Social history  Social History     Socioeconomic History   • Marital status: /Civil Union     Spouse name: Not on file   • Number of children: Not on file   • Years of education: Not on file   • Highest education level: Not on file   Occupational History   • Not on file   Tobacco Use   • Smoking status: Never   • Smokeless tobacco: Never   Vaping Use   • Vaping Use: Never used   Substance and Sexual Activity   • Alcohol use: No   • Drug use: No   • Sexual activity: Not Currently     Partners: Male     Birth control/protection: Female Sterilization   Other Topics Concern   • Not on file   Social History Narrative   • Not on file     Social Determinants of Health     Financial Resource Strain: Not on file   Food Insecurity: Not on file   Transportation Needs: Not on file   Physical Activity: Not on file   Stress: Not on file   Social Connections: Not on file   Intimate Partner Violence: Not on file   Housing Stability: Not on file     Social History     Tobacco Use   Smoking Status Never   Smokeless Tobacco Never       Current Medications  Current Outpatient Medications   Medication Sig Dispense Refill   • ALPRAZolam (XANAX) 0 5 mg tablet Take by mouth as needed     • amLODIPine-benazepril (LOTREL 5-10) 5-10 MG per capsule      • Carboxymethylcellul-Glycerin (Refresh Optive) 1-0 9 % GEL      • Cephalexin 500 MG tablet Take 4 tablets by mouth     • Cholecalciferol (Vitamin D3) 25 MCG (1000 UT) CAPS Take 2 capsules by mouth     • DHA-EPA-Flaxseed Oil-Vitamin E (THERA TEARS) CAPS Take by mouth     • escitalopram (LEXAPRO) 10 mg tablet Take 10 mg by mouth daily     • estradiol (ESTRACE) 0 1 mg/g vaginal cream INSERT 1 G INTO THE VAGINA 2 (TWO) TIMES A WEEK 42 5 g 3   • meloxicam (MOBIC) 7 5 mg tablet Take 1 tablet by mouth daily as needed     • metoprolol succinate (TOPROL-XL) 25 mg 24 hr tablet TAKE 1 TABLET BY MOUTH EVERY DAY 90 tablet 1   • nystatin-triamcinolone (MYCOLOG-II) cream Apply topically 2 (two) times a day 60 g 2   • Artificial Tear Ointment (DRY EYES OP) Apply to eye OPTASE (Patient not taking: Reported on 11/14/2022)     • cholestyramine (QUESTRAN) 4 g packet Take 1 packet (4 g total) by mouth 3 (three) times a day with meals (Patient not taking: Reported on 11/14/2022) 5400 each 6   • cyclobenzaprine (FLEXERIL) 5 mg tablet Take 5 mg by mouth daily at bedtime (Patient not taking: Reported on 11/14/2022)     • diflunisal (DOLOBID) 500 mg tablet  (Patient not taking: Reported on 11/14/2022)     • estradiol (ESTRACE) 0 1 mg/g vaginal cream Insert 1 g into the vagina 2 (two) times a week (Patient not taking: Reported on 6/23/2023) 42 5 g 1   • glycerin-hypromellose- (ARTIFICIAL TEARS) 0 2-0 2-1 % SOLN Optase OTC UAD (Patient not taking: Reported on 11/14/2022)     • metoprolol succinate (TOPROL-XL) 25 mg 24 hr tablet TAKE 1 TABLET BY MOUTH EVERY DAY (Patient not taking: Reported on 11/23/2022) 90 tablet 0   • metoprolol succinate (TOPROL-XL) 25 mg 24 hr tablet Take 1 tablet (25 mg total) by mouth daily (Patient not taking: Reported on 11/14/2022) 90 tablet 0   • Propylene Glycol (SYSTANE BALANCE OP) Apply to eye (Patient not taking: Reported on 11/14/2022)       No current facility-administered medications for this visit         Allergies  No Known "Allergies    Past Medical History, Social History, Family History, medications and allergies were reviewed      Vitals  Vitals:    06/23/23 0824   BP: 138/74   Pulse: 69   SpO2: 98%   Weight: 72 6 kg (160 lb)   Height: 5' 1\" (1 549 m)       Physical Exam  Physical Exam      Results  No results found for: \"PSA\"  Lab Results   Component Value Date    GLUCOSE 83 04/28/2015    CALCIUM 9 3 05/01/2021     04/28/2015    K 4 6 05/01/2021    CO2 26 05/01/2021     (H) 05/01/2021    BUN 13 05/01/2021    CREATININE 0 88 05/01/2021     Lab Results   Component Value Date    WBC 13 13 (H) 05/09/2015    HGB 9 0 (L) 05/09/2015    HCT 28 2 (L) 05/09/2015    MCV 94 05/09/2015     05/09/2015           "

## 2023-07-16 DIAGNOSIS — I10 ESSENTIAL HYPERTENSION: ICD-10-CM

## 2023-07-17 RX ORDER — METOPROLOL SUCCINATE 25 MG/1
TABLET, EXTENDED RELEASE ORAL
Qty: 90 TABLET | Refills: 1 | Status: SHIPPED | OUTPATIENT
Start: 2023-07-17

## 2023-08-08 ENCOUNTER — HOSPITAL ENCOUNTER (OUTPATIENT)
Dept: RADIOLOGY | Age: 68
Discharge: HOME | End: 2023-08-08
Payer: COMMERCIAL

## 2023-08-08 DIAGNOSIS — Z78.0 POSTMENOPAUSAL STATUS: ICD-10-CM

## 2023-08-08 DIAGNOSIS — Z13.820 SCREENING FOR OSTEOPOROSIS: ICD-10-CM

## 2023-08-08 PROCEDURE — 77080 DXA BONE DENSITY AXIAL: CPT

## 2023-12-16 ENCOUNTER — HOSPITAL ENCOUNTER (OUTPATIENT)
Dept: RADIOLOGY | Age: 68
Discharge: HOME | End: 2023-12-16
Payer: COMMERCIAL

## 2023-12-16 VITALS — BODY MASS INDEX: 30.21 KG/M2 | WEIGHT: 160 LBS | HEIGHT: 61 IN

## 2023-12-16 DIAGNOSIS — Z12.31 VISIT FOR SCREENING MAMMOGRAM: ICD-10-CM

## 2023-12-16 PROCEDURE — 77063 BREAST TOMOSYNTHESIS BI: CPT

## 2023-12-16 PROCEDURE — 77067 SCR MAMMO BI INCL CAD: CPT

## 2024-01-04 ENCOUNTER — ANNUAL EXAM (OUTPATIENT)
Dept: OBGYN CLINIC | Facility: CLINIC | Age: 69
End: 2024-01-04
Payer: COMMERCIAL

## 2024-01-04 VITALS
DIASTOLIC BLOOD PRESSURE: 82 MMHG | HEART RATE: 65 BPM | WEIGHT: 159 LBS | HEIGHT: 61 IN | BODY MASS INDEX: 30.02 KG/M2 | SYSTOLIC BLOOD PRESSURE: 128 MMHG

## 2024-01-04 DIAGNOSIS — Z01.419 ROUTINE GYNECOLOGICAL EXAMINATION: Primary | ICD-10-CM

## 2024-01-04 DIAGNOSIS — N95.2 VAGINAL ATROPHY: ICD-10-CM

## 2024-01-04 PROCEDURE — 99397 PER PM REEVAL EST PAT 65+ YR: CPT | Performed by: PHYSICIAN ASSISTANT

## 2024-01-04 RX ORDER — ESTRADIOL 0.1 MG/G
1 CREAM VAGINAL 2 TIMES WEEKLY
Qty: 42.5 G | Refills: 3 | Status: SHIPPED | OUTPATIENT
Start: 2024-01-04

## 2024-01-04 NOTE — PROGRESS NOTES
Assessment   68 y.o.  who is s/p hysterectomy presenting for annual exam.    Plan   Diagnoses and all orders for this visit:    Routine gynecological examination    Normal findings on routine exam.   considerations reviewed. Aware of sx to report.   Breast awareness/SBE encouraged.  Encouraged 150 min of exercise per week.  Reviewed balanced diet.   Vitamin D and Calcium supplement recommended.     Vaginal atrophy  -     estradiol (ESTRACE) 0.1 mg/g vaginal cream; Insert 1 g into the vagina 2 (two) times a week    Reviewed use. Refill sent to pharmacy on file.         Pap - no longer indicated  Mammo - scheduled 25   Colonoscopy due    DEXA due         RTO one year for yearly exam or sooner as needed.      __________________________________________________________________    SCREENING  Last Pap: prior to hysterectomy   Last Mammo: 2023 BIRADS 1 - Negative  Last Colonoscopy: 10/31/2022 5 year recall  Last DEXA: 23 normal     Subjective     Iva Reid is a 68 y.o.  who is s/p hysterectomy presenting for annual exam.      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  is still working and carrying their insurance.   Daughter living in [place] with  and dogs. Son/wife and her four grandkids live in [place]. Ages 4-13. Sees them about once per month    GYN  Hysterectomy with BSO 2014 due to heavy bleeding and endometrial polyps     Denies vaginal bleeding, vaginal discharge, itching, odor, pelvic pain and vulvar/vaginal symptoms    Uses estrace cream PV once weekly with good relief of atrophy sx.     Sexually active: No - she and  are accepting of this.   Concerns: denies pain, bleeding, dryness     Hx Abnormal pap: denies  We reviewed ASCCP guidelines for Pap testing today.       OB         Complaints: denies  Denies leakage/difficulty urinating, dysuria, hematuria, and urinary frequency/urgency    BREAST  Complaints: denies  Denies:  breast lump, breast tenderness, dryness, nipple discharge, pruritus, skin color change, and skin lesion(s)      Pertinent Family Hx:   Family hx of breast cancer: MGM, maternal aunts   Family hx of ovarian cancer: no - cousin had uterine not previously ov ca  Family hx of colon cancer: no    Past Medical History:   Diagnosis Date    Anxiety     Hematuria     Hypertension     Irregular heart beat     Kidney stone May 2021       Past Surgical History:   Procedure Laterality Date    BILATERAL SALPINGOOPHORECTOMY      last assessed 8/11/14    BREAST BIOPSY Left     EXCISIONAL BIOPSY BENIGN    CHOLECYSTECTOMY  2009    COLONOSCOPY  10/31/2022    HYSTERECTOMY  2004    KNEE SURGERY      OOPHORECTOMY Bilateral 2004         Current Outpatient Medications:     ALPRAZolam (XANAX) 0.5 mg tablet, Take by mouth as needed, Disp: , Rfl:     amLODIPine-benazepril (LOTREL 5-10) 5-10 MG per capsule, , Disp: , Rfl:     Carboxymethylcellul-Glycerin (Refresh Optive) 1-0.9 % GEL, , Disp: , Rfl:     Cephalexin 500 MG tablet, Take 4 tablets by mouth, Disp: , Rfl:     Cholecalciferol (Vitamin D3) 25 MCG (1000 UT) CAPS, Take 2 capsules by mouth, Disp: , Rfl:     DHA-EPA-Flaxseed Oil-Vitamin E (THERA TEARS) CAPS, Take by mouth, Disp: , Rfl:     escitalopram (LEXAPRO) 10 mg tablet, Take 10 mg by mouth daily, Disp: , Rfl:     estradiol (ESTRACE) 0.1 mg/g vaginal cream, Insert 1 g into the vagina 2 (two) times a week, Disp: 42.5 g, Rfl: 3    meloxicam (MOBIC) 7.5 mg tablet, Take 1 tablet by mouth daily as needed, Disp: , Rfl:     metoprolol succinate (TOPROL-XL) 25 mg 24 hr tablet, TAKE 1 TABLET BY MOUTH EVERY DAY, Disp: 90 tablet, Rfl: 1    nystatin-triamcinolone (MYCOLOG-II) cream, Apply topically 2 (two) times a day, Disp: 60 g, Rfl: 2    Artificial Tear Ointment (DRY EYES OP), Apply to eye OPTASE (Patient not taking: Reported on 11/14/2022), Disp: , Rfl:     cholestyramine (QUESTRAN) 4 g packet, Take 1 packet (4 g total) by mouth 3 (three)  times a day with meals (Patient not taking: Reported on 11/14/2022), Disp: 5400 each, Rfl: 6    cyclobenzaprine (FLEXERIL) 5 mg tablet, Take 5 mg by mouth daily at bedtime (Patient not taking: Reported on 11/14/2022), Disp: , Rfl:     diflunisal (DOLOBID) 500 mg tablet, , Disp: , Rfl:     estradiol (ESTRACE) 0.1 mg/g vaginal cream, Insert 1 g into the vagina 2 (two) times a week (Patient not taking: Reported on 6/23/2023), Disp: 42.5 g, Rfl: 1    glycerin-hypromellose- (ARTIFICIAL TEARS) 0.2-0.2-1 % SOLN, Optase OTC UAD (Patient not taking: Reported on 11/14/2022), Disp: , Rfl:     metoprolol succinate (TOPROL-XL) 25 mg 24 hr tablet, TAKE 1 TABLET BY MOUTH EVERY DAY (Patient not taking: Reported on 11/23/2022), Disp: 90 tablet, Rfl: 0    metoprolol succinate (TOPROL-XL) 25 mg 24 hr tablet, Take 1 tablet (25 mg total) by mouth daily (Patient not taking: Reported on 11/14/2022), Disp: 90 tablet, Rfl: 0    Propylene Glycol (SYSTANE BALANCE OP), Apply to eye (Patient not taking: Reported on 11/14/2022), Disp: , Rfl:     No Known Allergies    Social History     Socioeconomic History    Marital status: /Civil Union     Spouse name: Not on file    Number of children: Not on file    Years of education: Not on file    Highest education level: Not on file   Occupational History    Not on file   Tobacco Use    Smoking status: Never    Smokeless tobacco: Never   Vaping Use    Vaping status: Never Used   Substance and Sexual Activity    Alcohol use: No    Drug use: No    Sexual activity: Not Currently     Partners: Male     Birth control/protection: Female Sterilization   Other Topics Concern    Not on file   Social History Narrative    Not on file     Social Determinants of Health     Financial Resource Strain: Not on file   Food Insecurity: Not on file   Transportation Needs: Not on file   Physical Activity: Not on file   Stress: Not on file   Social Connections: Not on file   Intimate Partner Violence: Not on  "file   Housing Stability: Not on file         REVIEW OF SYSTEMS  Constitutional: Negative  Respiratory: Negative.    Cardiovascular: Negative.  Breast: Negative for breast lump, breast tenderness, nipple discharge, skin color change, and skin lesion(s)  Gastrointestinal: Negative for abdominal pain, change in bowel habits.   Genitourinary: Negative for change in urinary stream, dysuria, urinary frequency/urgency, and frequent UTIs    OBJECTIVE  Blood pressure 128/82, pulse 65, height 5' 0.5\" (1.537 m), weight 72.1 kg (159 lb), not currently breastfeeding.    Patient appears well and is not in distress  Breasts are symmetrical without mass, tenderness, nipple discharge, skin changes or adenopathy.   Abdomen is soft and nontender without masses.   External genitals are normal without lesions or rashes.  Urethral meatus and urethra are normal  Vaginal cuff is intact without discharge or bleeding.   Cervix, uterus, and adnexa are surgically absent.No overlying masses, nontender   Skin warm and dry   Alert and oriented x 3 with normal affect    "

## 2024-04-08 DIAGNOSIS — M25.562 LEFT KNEE PAIN, UNSPECIFIED CHRONICITY: Primary | ICD-10-CM

## 2024-04-26 ENCOUNTER — TELEPHONE (OUTPATIENT)
Age: 69
End: 2024-04-26

## 2024-04-26 DIAGNOSIS — N20.0 NEPHROLITHIASIS: Primary | ICD-10-CM

## 2024-04-26 NOTE — TELEPHONE ENCOUNTER
Patient of Dr. Mcclain at [facility]    Patient called stating she needs to do ultrasound prior to her yearly follow up.  She needs an updated u/s order in epic. It will  in  and she wantst to do it in July.  She will schedule it for July prior to her appointment.     Patient can be reached at [phone]

## 2024-05-20 RX ORDER — ACETAMINOPHEN 160 MG
TABLET,DISINTEGRATING ORAL
COMMUNITY

## 2024-05-20 RX ORDER — AMLODIPINE BESYLATE AND BENAZEPRIL HYDROCHLORIDE 5; 20 MG/1; MG/1
CAPSULE ORAL
COMMUNITY
Start: 2024-02-01

## 2024-05-20 RX ORDER — CEPHALEXIN 500 MG/1
CAPSULE ORAL
COMMUNITY
Start: 2024-03-21

## 2024-05-21 ENCOUNTER — OFFICE VISIT (OUTPATIENT)
Dept: OBGYN CLINIC | Facility: HOSPITAL | Age: 69
End: 2024-05-21
Payer: COMMERCIAL

## 2024-05-21 ENCOUNTER — HOSPITAL ENCOUNTER (OUTPATIENT)
Dept: RADIOLOGY | Facility: HOSPITAL | Age: 69
Discharge: HOME | End: 2024-05-21
Attending: ORTHOPAEDIC SURGERY
Payer: COMMERCIAL

## 2024-05-21 VITALS
WEIGHT: 161 LBS | BODY MASS INDEX: 30.4 KG/M2 | SYSTOLIC BLOOD PRESSURE: 116 MMHG | HEIGHT: 61 IN | DIASTOLIC BLOOD PRESSURE: 71 MMHG | HEART RATE: 76 BPM

## 2024-05-21 DIAGNOSIS — G89.29 CHRONIC PAIN OF LEFT KNEE: ICD-10-CM

## 2024-05-21 DIAGNOSIS — M25.562 CHRONIC PAIN OF LEFT KNEE: ICD-10-CM

## 2024-05-21 DIAGNOSIS — M17.12 PRIMARY OSTEOARTHRITIS OF LEFT KNEE: Primary | ICD-10-CM

## 2024-05-21 DIAGNOSIS — M25.462 EFFUSION OF LEFT KNEE: ICD-10-CM

## 2024-05-21 DIAGNOSIS — M25.562 LEFT KNEE PAIN, UNSPECIFIED CHRONICITY: ICD-10-CM

## 2024-05-21 PROCEDURE — 73562 X-RAY EXAM OF KNEE 3: CPT

## 2024-05-21 PROCEDURE — 20610 DRAIN/INJ JOINT/BURSA W/O US: CPT | Performed by: ORTHOPAEDIC SURGERY

## 2024-05-21 PROCEDURE — 99204 OFFICE O/P NEW MOD 45 MIN: CPT | Performed by: ORTHOPAEDIC SURGERY

## 2024-05-21 RX ORDER — METHYLPREDNISOLONE ACETATE 80 MG/ML
1 INJECTION, SUSPENSION INTRA-ARTICULAR; INTRALESIONAL; INTRAMUSCULAR; SOFT TISSUE
Status: COMPLETED | OUTPATIENT
Start: 2024-05-21 | End: 2024-05-21

## 2024-05-21 RX ORDER — ROPIVACAINE HYDROCHLORIDE 5 MG/ML
8 INJECTION, SOLUTION EPIDURAL; INFILTRATION; PERINEURAL
Status: COMPLETED | OUTPATIENT
Start: 2024-05-21 | End: 2024-05-21

## 2024-05-21 RX ADMIN — METHYLPREDNISOLONE ACETATE 1 ML: 80 INJECTION, SUSPENSION INTRA-ARTICULAR; INTRALESIONAL; INTRAMUSCULAR; SOFT TISSUE at 11:00

## 2024-05-21 RX ADMIN — ROPIVACAINE HYDROCHLORIDE 8 ML: 5 INJECTION, SOLUTION EPIDURAL; INFILTRATION; PERINEURAL at 11:00

## 2024-05-21 NOTE — PATIENT INSTRUCTIONS
Diagnosis ICD-10-CM Associated Orders   1. Primary osteoarthritis of left knee  M17.12 Large joint arthrocentesis: L knee      2. Chronic pain of left knee  M25.562 Large joint arthrocentesis: L knee    G89.29       3. Effusion of left knee  M25.462 Large joint arthrocentesis: L knee          Return for re-check left knee for visco.    Intra-articular Hyaluronic Acid Injection  Intra-articular Hyaluronic Acid Injection Brands  There are several types of Intra-articular Hyaluronic Acid Injections which vary in brand, dosage, and frequency.  There are single dose injections as well as a series of injections. Your provider will choose the injection brand and series based on clinical factors as well as what your insurance company prefers or covers.     Buy and Bill vs. Specialty Pharmacy  Injections may be obtained in two ways:  Buy and Bill: The insurance is requiring the office to buy the injection medication and bill the patients insurance for both the injection medication and the office visit.   Specialty Pharmacy: The insurance is requiring the office to order the medication from the insurances Specialty Pharmacy and the specialty pharmacy will bill the patients insurance directly for the injection medication. When a specialty pharmacy is used, the office cannot bill for the injection medication, the office can only bill for the office visit. (Examples of a specialty pharmacy include, but not limited to, [facility], [facility], [facility], [facility]).    Time Line Expectations  Your care team will work to ensure the injection(s) being ordered for you are authorized by your insurance and is obtained by the insurance plans preferred pharmacy. Your insurance plan may dictate the brand and dosage of the series.  Due to the nature of obtaining an insurance authorization as well as working with the pharmacy, the authorization process may take up to 14  business days, sometimes longer if your insurance plan denies the injection authorization or if the pharmacy has delays. If your insurance plan denies the authorization our team will submit an appeal which may lengthen the wait time for the approval of your injection.    Our injection authorization team will be in contact with you throughout the injection authorization process, however, please note there may be times of silence while we wait for insurance and pharmacy updates.  Your injection appointment(s) will be scheduled once our injection authorization team has received approval from your insurance plan and/or from the pharmacy.      Please note: Specialty pharmacies are often delayed when obtaining authorizations and verifying benefits for injection medications. You may receive a phone call from the specialty pharmacy to authorize the medication; so it is important to accept calls from the specialty pharmacy to ensure your injections are not delayed.

## 2024-05-21 NOTE — PROGRESS NOTES
Assessment:   Diagnosis ICD-10-CM Associated Orders   1. Primary osteoarthritis of left knee  M17.12 Large joint arthrocentesis: L knee     Injection Procedure Prior Authorization      2. Chronic pain of left knee  M25.562 Large joint arthrocentesis: L knee    G89.29 Injection Procedure Prior Authorization      3. Effusion of left knee  M25.462 Large joint arthrocentesis: L knee          Plan:  Diagnostics reviewed and physical exam performed.  Diagnosis, treatment options and associated risks were discussed with the patient including no treatment, nonsurgical treatment and potential for surgical intervention.  The patient was given the opportunity to ask questions regarding each.  She was offered, accepted, performed an aspiration of her left knee with injection of cortisone today for symptomatic relief.  She tolerated the procedures well.  Compression wrap applied which may be removed later this evening.  Ice and postinjection protocol advised.  Weightbearing activity as tolerated.  We will petition her insurance for Visco product for her left knee upon approval.  Future recommendation and quality of life decision pursue elective left total knee arthroplasty.    To do next visit:  Return for re-check left knee for visco.    The above stated was discussed in layman's terms and the patient expressed understanding.  All questions were answered to the patient's satisfaction.       Scribe Attestation      I,:  Milton Mtz am acting as a scribe while in the presence of the attending physician.:       I,:  Mynor Sarabia MD personally performed the services described in this documentation    as scribed in my presence.:               Subjective:   Iva Reid is a 69 y.o. female who presents today for evaluation of her left knee due to pain, swelling as well as stiffness.  She has a history of right total knee arthroplasty May 2015 and doing very well.  She made an appointment earlier this year due to pain and  complaints of limited function at her left knee.  She went to her PCP who prescribed 7.5 mg of Mobic which she has been taking intermittently which has been beneficial.  She does have stiffness when she gets up after prolonged sedentary positions as well as ambulating stairs in an alternating fashion.  Pain scale 7/10 at times.      Review of systems negative unless otherwise specified in HPI  Review of Systems    Past Medical History:   Diagnosis Date    Anxiety     Hematuria     Hypertension     Irregular heart beat     Kidney stone May 2021       Past Surgical History:   Procedure Laterality Date    BILATERAL SALPINGOOPHORECTOMY      last assessed 8/11/14    BREAST BIOPSY Left     EXCISIONAL BIOPSY BENIGN    CHOLECYSTECTOMY  2009    COLONOSCOPY  10/31/2022    HYSTERECTOMY  2004    KNEE SURGERY      OOPHORECTOMY Bilateral 2004       Family History   Problem Relation Age of Onset    Arthritis Mother     Irregular heart beat Mother     Hypertension Mother     Hyperlipidemia Mother     Cancer Father         Bladder - cancer treated. No longer has.    Stroke Father     Arthritis Father     ROOPA disease Sister     Ulcerative colitis Daughter     No Known Problems Son     Breast cancer Maternal Grandmother 80    No Known Problems Maternal Grandfather     Dementia Paternal Grandmother     Obesity Paternal Grandmother     Emphysema Paternal Grandfather     Heart attack Paternal Grandfather     ROOPA disease Sister     Breast cancer Maternal Aunt 70    Breast cancer Maternal Aunt 70    No Known Problems Maternal Aunt     Uterine cancer Cousin 40    No Known Problems Paternal Aunt     No Known Problems Paternal Aunt        Social History     Occupational History    Not on file   Tobacco Use    Smoking status: Never    Smokeless tobacco: Never   Vaping Use    Vaping status: Never Used   Substance and Sexual Activity    Alcohol use: No    Drug use: No    Sexual activity: Not Currently     Partners: Male     Birth  control/protection: Female Sterilization         Current Outpatient Medications:     amLODIPine-benazepril (LOTREL 5-20) 5-20 MG per capsule, , Disp: , Rfl:     cephalexin (KEFLEX) 500 mg capsule, TAKE 4 CAPS 1 HOUR PRIOR TO DENTAL APPT, Disp: , Rfl:     tetrahydrozoline-zinc (VISINE-AC) 0.05-0.25 % ophthalmic solution, Optase eye drops., Disp: , Rfl:     ALPRAZolam (XANAX) 0.5 mg tablet, Take by mouth as needed, Disp: , Rfl:     Artificial Tear Ointment (DRY EYES OP), Apply to eye OPTASE (Patient not taking: Reported on 11/14/2022), Disp: , Rfl:     Carboxymethylcellul-Glycerin (Refresh Optive) 1-0.9 % GEL, , Disp: , Rfl:     Cholecalciferol (Vitamin D3) 50 MCG (2000 UT) capsule, , Disp: , Rfl:     DHA-EPA-Flaxseed Oil-Vitamin E (THERA TEARS) CAPS, Take by mouth, Disp: , Rfl:     escitalopram (LEXAPRO) 10 mg tablet, Take 10 mg by mouth daily, Disp: , Rfl:     estradiol (ESTRACE) 0.1 mg/g vaginal cream, Insert 1 g into the vagina 2 (two) times a week (Patient not taking: Reported on 6/23/2023), Disp: 42.5 g, Rfl: 1    estradiol (ESTRACE) 0.1 mg/g vaginal cream, Insert 1 g into the vagina 2 (two) times a week, Disp: 42.5 g, Rfl: 3    glycerin-hypromellose- (ARTIFICIAL TEARS) 0.2-0.2-1 % SOLN, Optase OTC UAD (Patient not taking: Reported on 11/14/2022), Disp: , Rfl:     meloxicam (MOBIC) 7.5 mg tablet, Take 1 tablet by mouth daily as needed, Disp: , Rfl:     metoprolol succinate (TOPROL-XL) 25 mg 24 hr tablet, TAKE 1 TABLET BY MOUTH EVERY DAY (Patient not taking: Reported on 11/23/2022), Disp: 90 tablet, Rfl: 0    metoprolol succinate (TOPROL-XL) 25 mg 24 hr tablet, Take 1 tablet (25 mg total) by mouth daily (Patient not taking: Reported on 11/14/2022), Disp: 90 tablet, Rfl: 0    metoprolol succinate (TOPROL-XL) 25 mg 24 hr tablet, TAKE 1 TABLET BY MOUTH EVERY DAY, Disp: 90 tablet, Rfl: 1    nystatin-triamcinolone (MYCOLOG-II) cream, Apply topically 2 (two) times a day, Disp: 60 g, Rfl: 2    No Known  "Allergies         Vitals:    05/21/24 1051   BP: 116/71   Pulse: 76       Body mass index is 30.93 kg/m².  Wt Readings from Last 3 Encounters:   05/21/24 73 kg (161 lb)   01/04/24 72.1 kg (159 lb)   12/16/23 72.6 kg (160 lb)       Objective:                    Left Knee Exam     Muscle Strength   The patient has normal left knee strength.    Tenderness   The patient is experiencing tenderness in the medial joint line.    Range of Motion   Extension:  5   Flexion:  110 (With crepitation limited by stiffness)     Other   Erythema: absent  Sensation: normal  Swelling: mild  Effusion: effusion present    Comments:    Varus alignment with bony enlargement medially.            Diagnostics, reviewed and taken today if performed as documented:    The attending physician has personally reviewed the pertinent films in PACS and interpretation is as follows:    Left knee x-rays taken and reviewed in the office today and show: moderately-severe degenerative changes with near bone-on-bone opposition medial and patellofemoral compartment. Calcification distal quadriceps tendon. Subchondral sclerosis and osteophyte formation present. Varus alignment.              Procedures, if performed today:    Large joint arthrocentesis: L knee  Universal Protocol:  Consent: Verbal consent obtained.  Risks and benefits: risks, benefits and alternatives were discussed  Consent given by: patient  Time out: Immediately prior to procedure a \"time out\" was called to verify the correct patient, procedure, equipment, support staff and site/side marked as required.  Timeout called at: 5/21/2024 11:44 AM.  Patient understanding: patient states understanding of the procedure being performed  Site marked: the operative site was marked  Patient identity confirmed: verbally with patient  Supporting Documentation  Indications: pain and diagnostic evaluation   Procedure Details  Location: knee - L knee  Preparation: Patient was prepped and draped in the usual " "sterile fashion  Needle size: 18 G  Ultrasound guidance: no  Approach: supralateral.  Medications administered: 8 mL ropivacaine 0.5 %; 1 mL methylPREDNISolone acetate 80 mg/mL    Aspirate amount: 30 mL  Aspirate: clear, serous and yellow  Patient tolerance: patient tolerated the procedure well with no immediate complications    Compression wrap applied          Portions of the record may have been created with voice recognition software.  Occasional wrong word or \"sound a like\" substitutions may have occurred due to the inherent limitations of voice recognition software.  Read the chart carefully and recognize, using context, where substitutions have occurred.  "

## 2024-05-24 ENCOUNTER — TELEPHONE (OUTPATIENT)
Age: 69
End: 2024-05-24

## 2024-06-17 ENCOUNTER — TELEPHONE (OUTPATIENT)
Age: 69
End: 2024-06-17

## 2024-06-17 NOTE — TELEPHONE ENCOUNTER
Patient returned missed call.  Advised patient she did receive CSI on 5/21  We also scheduled her visco injection for 8/29  No other questions

## 2024-06-17 NOTE — TELEPHONE ENCOUNTER
Caller: Patient    Doctor: Dr. Sarabia    Reason for call: Patient calling for verification if she did receive a CSI to her left knee.  OVN states that she did receive aspiration with CSI.  She thought that Dr. Sarabia only aspirated the knee.  She would like to get clarification prior to scheduling her Durolane injections.      Call back#: [phone]

## 2024-07-15 ENCOUNTER — HOSPITAL ENCOUNTER (OUTPATIENT)
Dept: RADIOLOGY | Age: 69
Discharge: HOME | End: 2024-07-15
Payer: COMMERCIAL

## 2024-07-15 DIAGNOSIS — N20.0 NEPHROLITHIASIS: ICD-10-CM

## 2024-07-15 PROCEDURE — 76775 US EXAM ABDO BACK WALL LIM: CPT

## 2024-07-30 ENCOUNTER — OFFICE VISIT (OUTPATIENT)
Dept: UROLOGY | Facility: AMBULATORY SURGERY CENTER | Age: 69
End: 2024-07-30
Payer: COMMERCIAL

## 2024-07-30 VITALS
OXYGEN SATURATION: 98 % | HEIGHT: 62 IN | HEART RATE: 70 BPM | WEIGHT: 163.4 LBS | BODY MASS INDEX: 30.07 KG/M2 | DIASTOLIC BLOOD PRESSURE: 78 MMHG | SYSTOLIC BLOOD PRESSURE: 140 MMHG

## 2024-07-30 DIAGNOSIS — N20.0 NEPHROLITHIASIS: ICD-10-CM

## 2024-07-30 DIAGNOSIS — D17.71 ANGIOMYOLIPOMA OF BOTH KIDNEYS: Primary | ICD-10-CM

## 2024-07-30 PROCEDURE — 99213 OFFICE O/P EST LOW 20 MIN: CPT

## 2024-07-30 NOTE — ASSESSMENT & PLAN NOTE
Patient has a history of kidney stones with her last stone episode being in 2021  We discussed dietary modifications and the patient's most recent ultrasound performed 7/15/2024 did not note any kidney stones bilaterally.  Patient will follow-up in 1 year with an ultrasound prior.

## 2024-07-30 NOTE — PROGRESS NOTES
7/30/2024      Assessment and Plan    69 y.o. female managed by Dr. Mcclain    Angiomyolipoma of both kidneys  Patient underwent ultrasound of the kidney and bladder performed 7/15/2024 which noted a right renal lower pole angiomyolipoma measuring 6 mm and a left mid renal hyperechoic lesion that was noted to likely be an angiomyolipoma measuring 4 mm.  These lesions have been stable and unchanged in size for the last 3 years  We discussed that with the lesions being stable for the last 3 years, that we could consider following on an as-needed basis.  However, the patient would prefer that we continue to follow yearly with an ultrasound to continue to monitor for change in size as well as her history of kidney stones.    Nephrolithiasis  Patient has a history of kidney stones with her last stone episode being in 2021  We discussed dietary modifications and the patient's most recent ultrasound performed 7/15/2024 did not note any kidney stones bilaterally.  Patient will follow-up in 1 year with an ultrasound prior.        History of Present Illness  Iva Reid is a 69 y.o. female here for evaluation of history of nephrolithiasis and bilateral small lesions likely angiomyolipomas.  Patient's last stone episode was in 2021, and has been followed with no recurrence since.  Additionally, in 2021 the patient was noted to have bilateral small lesions considered angiomyolipomas.  The patient's most recent ultrasound of the kidney and bladder was performed 7/15/2024 which noted a right renal lower pole angiomyolipoma measuring 6 mm and a left mid renal hyperechoic lesion measuring 4 mm and are stable and unchanged from her previous ultrasound performed 6/5/2023.  Today, the patient reports occasional nocturia 1-3 times at night, but notes that some of these nighttime awakenings may not be due to waking up with a strong urge to urinate but uses the bathroom nonetheless.  Otherwise, the patient denies worsening urinary  "urgency/frequency, dysuria, hematuria, flank pain, or feelings of incomplete bladder emptying.      Review of Systems   Constitutional:  Negative for chills and fever.   HENT:  Negative for ear pain and sore throat.    Eyes:  Negative for pain and visual disturbance.   Respiratory:  Negative for cough and shortness of breath.    Cardiovascular:  Negative for chest pain and palpitations.   Gastrointestinal:  Negative for abdominal pain and vomiting.   Genitourinary:  Negative for decreased urine volume, difficulty urinating, dysuria, flank pain, frequency, hematuria and urgency.        Nocturia 1-3 times   Musculoskeletal:  Negative for arthralgias and back pain.   Skin:  Negative for color change and rash.   Neurological:  Negative for seizures and syncope.   All other systems reviewed and are negative.               Vitals  Vitals:    07/30/24 1003   BP: 140/78   Pulse: 70   SpO2: 98%   Weight: 74.1 kg (163 lb 6.4 oz)   Height: 5' 2\" (1.575 m)       Physical Exam  Vitals reviewed.   Constitutional:       General: She is not in acute distress.     Appearance: Normal appearance. She is not ill-appearing.   HENT:      Head: Normocephalic and atraumatic.      Nose: Nose normal.   Eyes:      General: No scleral icterus.  Pulmonary:      Effort: No respiratory distress.   Abdominal:      General: Abdomen is flat. There is no distension.      Palpations: Abdomen is soft.      Tenderness: There is no abdominal tenderness.   Musculoskeletal:         General: Normal range of motion.      Cervical back: Normal range of motion.   Skin:     General: Skin is warm.      Coloration: Skin is not jaundiced.   Neurological:      Mental Status: She is alert and oriented to person, place, and time.      Gait: Gait normal.   Psychiatric:         Mood and Affect: Mood normal.         Behavior: Behavior normal.           Past History  Past Medical History:   Diagnosis Date    Anxiety     Hematuria     Hypertension     Irregular heart beat "     Kidney stone May 2021     Social History     Socioeconomic History    Marital status: /Civil Union     Spouse name: None    Number of children: None    Years of education: None    Highest education level: None   Occupational History    None   Tobacco Use    Smoking status: Never    Smokeless tobacco: Never   Vaping Use    Vaping status: Never Used   Substance and Sexual Activity    Alcohol use: No    Drug use: No    Sexual activity: Not Currently     Partners: Male     Birth control/protection: Female Sterilization   Other Topics Concern    None   Social History Narrative    None     Social Determinants of Health     Financial Resource Strain: Not on file   Food Insecurity: Not on file   Transportation Needs: Not on file   Physical Activity: Not on file   Stress: Not on file   Social Connections: Not on file   Intimate Partner Violence: Not on file   Housing Stability: Not on file     Social History     Tobacco Use   Smoking Status Never   Smokeless Tobacco Never     Family History   Problem Relation Age of Onset    Arthritis Mother     Irregular heart beat Mother     Hypertension Mother     Hyperlipidemia Mother     Cancer Father         Bladder - cancer treated. No longer has.    Stroke Father     Arthritis Father     ROOPA disease Sister     Ulcerative colitis Daughter     No Known Problems Son     Breast cancer Maternal Grandmother 80    No Known Problems Maternal Grandfather     Dementia Paternal Grandmother     Obesity Paternal Grandmother     Emphysema Paternal Grandfather     Heart attack Paternal Grandfather     ROOPA disease Sister     Breast cancer Maternal Aunt 70    Breast cancer Maternal Aunt 70    No Known Problems Maternal Aunt     Uterine cancer Cousin 40    No Known Problems Paternal Aunt     No Known Problems Paternal Aunt        The following portions of the patient's history were reviewed and updated as appropriate: allergies, current medications, past medical history, past social  "history, past surgical history and problem list.    Results  No results found for this or any previous visit (from the past 1 hour(s)).]  No results found for: \"PSA\"  Lab Results   Component Value Date    GLUCOSE 83 04/28/2015    CALCIUM 9.3 05/01/2021     04/28/2015    K 4.6 05/01/2021    CO2 26 05/01/2021     (H) 05/01/2021    BUN 13 05/01/2021    CREATININE 0.88 05/01/2021     Lab Results   Component Value Date    WBC 13.13 (H) 05/09/2015    HGB 9.0 (L) 05/09/2015    HCT 28.2 (L) 05/09/2015    MCV 94 05/09/2015     05/09/2015      "

## 2024-07-30 NOTE — ASSESSMENT & PLAN NOTE
Patient underwent ultrasound of the kidney and bladder performed 7/15/2024 which noted a right renal lower pole angiomyolipoma measuring 6 mm and a left mid renal hyperechoic lesion that was noted to likely be an angiomyolipoma measuring 4 mm.  These lesions have been stable and unchanged in size for the last 3 years  We discussed that with the lesions being stable for the last 3 years, that we could consider following on an as-needed basis.  However, the patient would prefer that we continue to follow yearly with an ultrasound to continue to monitor for change in size as well as her history of kidney stones.

## 2024-08-29 ENCOUNTER — PROCEDURE VISIT (OUTPATIENT)
Dept: OBGYN CLINIC | Facility: HOSPITAL | Age: 69
End: 2024-08-29
Payer: COMMERCIAL

## 2024-08-29 VITALS
HEIGHT: 62 IN | HEART RATE: 73 BPM | DIASTOLIC BLOOD PRESSURE: 73 MMHG | SYSTOLIC BLOOD PRESSURE: 126 MMHG | BODY MASS INDEX: 30.18 KG/M2 | WEIGHT: 164 LBS

## 2024-08-29 DIAGNOSIS — M17.12 PRIMARY OSTEOARTHRITIS OF LEFT KNEE: Primary | ICD-10-CM

## 2024-08-29 DIAGNOSIS — M25.462 EFFUSION OF LEFT KNEE: ICD-10-CM

## 2024-08-29 DIAGNOSIS — G89.29 CHRONIC PAIN OF LEFT KNEE: ICD-10-CM

## 2024-08-29 DIAGNOSIS — M25.562 CHRONIC PAIN OF LEFT KNEE: ICD-10-CM

## 2024-08-29 PROCEDURE — 20610 DRAIN/INJ JOINT/BURSA W/O US: CPT | Performed by: ORTHOPAEDIC SURGERY

## 2024-08-29 RX ORDER — LIDOCAINE HYDROCHLORIDE 10 MG/ML
4 INJECTION, SOLUTION INFILTRATION; PERINEURAL
Status: COMPLETED | OUTPATIENT
Start: 2024-08-29 | End: 2024-08-29

## 2024-08-29 RX ORDER — HYALURONATE SODIUM, STABILIZED 60 MG/3 ML
SYRINGE (ML) INTRAARTICULAR
COMMUNITY
Start: 2024-06-13

## 2024-08-29 RX ADMIN — LIDOCAINE HYDROCHLORIDE 4 ML: 10 INJECTION, SOLUTION INFILTRATION; PERINEURAL at 13:15

## 2024-08-29 NOTE — PROGRESS NOTES
Assessment:   Diagnosis ICD-10-CM Associated Orders   1. Primary osteoarthritis of left knee  M17.12 Large joint arthrocentesis: L knee      2. Chronic pain of left knee  M25.562 Large joint arthrocentesis: L knee    G89.29       3. Effusion of left knee  M25.462 Large joint arthrocentesis: L knee          Plan:  Diagnosis, treatment options and associated risks were discussed with the patient including no treatment, nonsurgical treatment and potential for surgical intervention.  The patient was given the opportunity to ask questions regarding each.   Her left knee was aspirated and injected with the Durolane visco product today. She tolerated all procedures well  Compression warp applied. Ice and post-injection protocol advised  Weight bearing and activities as tolerated  Quality of life decision to pursue elective left total knee arthroplasty.    To do next visit:  Return in about 3 months (around 11/29/2024) for re-check, or sooner if symptoms worsen or fail to improve.    The above stated was discussed in layman's terms and the patient expressed understanding.  All questions were answered to the patient's satisfaction.       Scribe Attestation      I,:  Milton Mtz am acting as a scribe while in the presence of the attending physician.:       I,:  Mynor Sarabia MD personally performed the services described in this documentation    as scribed in my presence.:               Subjective:   Iva Reid is a 69 y.o. female who presents today for repeat evaluation of her left knee due to return of pain and swelling.  She has known osteoarthritis with recurrent effusion.  At her visit 3 months ago her left knee was aspirated injected with cortisone with significant improvement of her symptoms.  She returns today with pain and stiffness at her left knee.  She has increased knee pain with weightbearing activities.  She has difficulty with deep knee flexion positions.  Pain scale 8-9/10 at times    History of  right total knee arthroplasty 2015 and doing very well.    She would like to hold off on scheduling elective left total knee arthroplasty until she is on Medicare in which her  will retire possibly December/January upcoming.      Review of systems negative unless otherwise specified in HPI  Review of Systems    Past Medical History:   Diagnosis Date    Anxiety     Hematuria     Hypertension     Irregular heart beat     Kidney stone May 2021       Past Surgical History:   Procedure Laterality Date    BILATERAL SALPINGOOPHORECTOMY      last assessed 8/11/14    BREAST BIOPSY Left     EXCISIONAL BIOPSY BENIGN    CHOLECYSTECTOMY  2009    COLONOSCOPY  10/31/2022    HYSTERECTOMY  2004    KNEE SURGERY      OOPHORECTOMY Bilateral 2004    ((Pt Qnr Sub: decrease kidney function))        Family History   Problem Relation Age of Onset    Arthritis Mother     Irregular heart beat Mother     Hypertension Mother     Hyperlipidemia Mother     Cancer Father         Bladder - cancer treated. No longer has.    Stroke Father     Arthritis Father     ROOPA disease Sister     Ulcerative colitis Daughter     No Known Problems Son     Breast cancer Maternal Grandmother 80    No Known Problems Maternal Grandfather     Dementia Paternal Grandmother     Obesity Paternal Grandmother     Emphysema Paternal Grandfather     Heart attack Paternal Grandfather     ROOPA disease Sister     Breast cancer Maternal Aunt 70    Breast cancer Maternal Aunt 70    No Known Problems Maternal Aunt     Uterine cancer Cousin 40    No Known Problems Paternal Aunt     No Known Problems Paternal Aunt        Social History     Occupational History    Not on file   Tobacco Use    Smoking status: Never    Smokeless tobacco: Never   Vaping Use    Vaping status: Never Used   Substance and Sexual Activity    Alcohol use: No    Drug use: No    Sexual activity: Not Currently     Partners: Male     Birth control/protection: Female Sterilization         Current Outpatient  Medications:     Durolane 60 MG/3ML injection, , Disp: , Rfl:     ALPRAZolam (XANAX) 0.5 mg tablet, Take by mouth as needed, Disp: , Rfl:     amLODIPine-benazepril (LOTREL 5-20) 5-20 MG per capsule, , Disp: , Rfl:     Artificial Tear Ointment (DRY EYES OP), Apply to eye OPTASE (Patient not taking: Reported on 11/14/2022), Disp: , Rfl:     Carboxymethylcellul-Glycerin (Refresh Optive) 1-0.9 % GEL, , Disp: , Rfl:     cephalexin (KEFLEX) 500 mg capsule, TAKE 4 CAPS 1 HOUR PRIOR TO DENTAL APPT, Disp: , Rfl:     Cholecalciferol (Vitamin D3) 50 MCG (2000 UT) capsule, , Disp: , Rfl:     DHA-EPA-Flaxseed Oil-Vitamin E (THERA TEARS) CAPS, Take by mouth, Disp: , Rfl:     escitalopram (LEXAPRO) 10 mg tablet, Take 10 mg by mouth daily, Disp: , Rfl:     estradiol (ESTRACE) 0.1 mg/g vaginal cream, Insert 1 g into the vagina 2 (two) times a week, Disp: 42.5 g, Rfl: 1    estradiol (ESTRACE) 0.1 mg/g vaginal cream, Insert 1 g into the vagina 2 (two) times a week, Disp: 42.5 g, Rfl: 3    glycerin-hypromellose- (ARTIFICIAL TEARS) 0.2-0.2-1 % SOLN, Optase OTC UAD (Patient not taking: Reported on 11/14/2022), Disp: , Rfl:     meloxicam (MOBIC) 7.5 mg tablet, Take 1 tablet by mouth daily as needed, Disp: , Rfl:     metoprolol succinate (TOPROL-XL) 25 mg 24 hr tablet, TAKE 1 TABLET BY MOUTH EVERY DAY, Disp: 90 tablet, Rfl: 0    metoprolol succinate (TOPROL-XL) 25 mg 24 hr tablet, Take 1 tablet (25 mg total) by mouth daily, Disp: 90 tablet, Rfl: 0    metoprolol succinate (TOPROL-XL) 25 mg 24 hr tablet, TAKE 1 TABLET BY MOUTH EVERY DAY, Disp: 90 tablet, Rfl: 1    nystatin-triamcinolone (MYCOLOG-II) cream, Apply topically 2 (two) times a day, Disp: 60 g, Rfl: 2    tetrahydrozoline-zinc (VISINE-AC) 0.05-0.25 % ophthalmic solution, Optase eye drops., Disp: , Rfl:     No Known Allergies         Vitals:    08/29/24 1256   BP: 126/73   Pulse: 73       Body mass index is 30 kg/m².  Wt Readings from Last 3 Encounters:   08/29/24 74.4 kg (164  "lb)   07/30/24 74.1 kg (163 lb 6.4 oz)   05/21/24 73 kg (161 lb)       Objective:                    Left Knee Exam     Muscle Strength   The patient has normal left knee strength.    Tenderness   The patient is experiencing tenderness in the medial joint line.    Range of Motion   Extension:  5   Flexion:  110 (With crepitation limited by stiffness)     Other   Erythema: absent  Sensation: normal  Swelling: mild  Effusion: effusion present    Comments:    Varus alignment with bony enlargement medially.            Diagnostics, reviewed and taken today if performed as documented:    None performed          Procedures, if performed today:    Large joint arthrocentesis: L knee  Universal Protocol:  Consent: Verbal consent obtained.  Risks and benefits: risks, benefits and alternatives were discussed  Consent given by: patient  Time out: Immediately prior to procedure a \"time out\" was called to verify the correct patient, procedure, equipment, support staff and site/side marked as required.  Timeout called at: 8/29/2024 1:03 PM.  Patient understanding: patient states understanding of the procedure being performed  Site marked: the operative site was marked  Patient identity confirmed: verbally with patient  Supporting Documentation  Indications: pain, diagnostic evaluation and joint swelling   Procedure Details  Location: knee - L knee  Preparation: Patient was prepped and draped in the usual sterile fashion  Needle size: 18 G  Ultrasound guidance: no  Approach: supralateral.  Medications administered: 4 mL lidocaine 1 %; 3 mL sodium hyaluronate 60 MG/3ML    Aspirate amount: 60 mL  Aspirate: clear, serous and yellow  Patient tolerance: patient tolerated the procedure well with no immediate complications    Compression wrap applied              Portions of the record may have been created with voice recognition software.  Occasional wrong word or \"sound a like\" substitutions may have occurred due to the inherent " limitations of voice recognition software.  Read the chart carefully and recognize, using context, where substitutions have occurred.

## 2024-09-02 DIAGNOSIS — I10 ESSENTIAL HYPERTENSION: ICD-10-CM

## 2024-09-03 RX ORDER — METOPROLOL SUCCINATE 25 MG/1
TABLET, EXTENDED RELEASE ORAL
Qty: 90 TABLET | Refills: 0 | Status: SHIPPED | OUTPATIENT
Start: 2024-09-03

## 2024-09-26 ENCOUNTER — OFFICE VISIT (OUTPATIENT)
Dept: OBGYN CLINIC | Facility: HOSPITAL | Age: 69
End: 2024-09-26
Payer: COMMERCIAL

## 2024-09-26 VITALS
SYSTOLIC BLOOD PRESSURE: 148 MMHG | HEIGHT: 62 IN | WEIGHT: 165 LBS | BODY MASS INDEX: 30.36 KG/M2 | HEART RATE: 68 BPM | DIASTOLIC BLOOD PRESSURE: 83 MMHG

## 2024-09-26 DIAGNOSIS — M17.12 PRIMARY OSTEOARTHRITIS OF LEFT KNEE: Primary | ICD-10-CM

## 2024-09-26 PROCEDURE — 99213 OFFICE O/P EST LOW 20 MIN: CPT | Performed by: ORTHOPAEDIC SURGERY

## 2024-09-26 PROCEDURE — 20610 DRAIN/INJ JOINT/BURSA W/O US: CPT | Performed by: ORTHOPAEDIC SURGERY

## 2024-09-26 RX ORDER — MELOXICAM 15 MG/1
TABLET ORAL
COMMUNITY
Start: 2024-09-02

## 2024-09-26 RX ORDER — BUPIVACAINE HYDROCHLORIDE 2.5 MG/ML
2 INJECTION, SOLUTION INFILTRATION; PERINEURAL
Status: COMPLETED | OUTPATIENT
Start: 2024-09-26 | End: 2024-09-26

## 2024-09-26 RX ORDER — ROPIVACAINE HYDROCHLORIDE 2 MG/ML
2 INJECTION, SOLUTION EPIDURAL; INFILTRATION; PERINEURAL
Status: COMPLETED | OUTPATIENT
Start: 2024-09-26 | End: 2024-09-26

## 2024-09-26 RX ORDER — BETAMETHASONE SODIUM PHOSPHATE AND BETAMETHASONE ACETATE 3; 3 MG/ML; MG/ML
12 INJECTION, SUSPENSION INTRA-ARTICULAR; INTRALESIONAL; INTRAMUSCULAR; SOFT TISSUE
Status: COMPLETED | OUTPATIENT
Start: 2024-09-26 | End: 2024-09-26

## 2024-09-26 RX ADMIN — BETAMETHASONE SODIUM PHOSPHATE AND BETAMETHASONE ACETATE 12 MG: 3; 3 INJECTION, SUSPENSION INTRA-ARTICULAR; INTRALESIONAL; INTRAMUSCULAR; SOFT TISSUE at 15:15

## 2024-09-26 RX ADMIN — BUPIVACAINE HYDROCHLORIDE 2 ML: 2.5 INJECTION, SOLUTION INFILTRATION; PERINEURAL at 15:15

## 2024-09-26 RX ADMIN — ROPIVACAINE HYDROCHLORIDE 2 ML: 2 INJECTION, SOLUTION EPIDURAL; INFILTRATION; PERINEURAL at 15:15

## 2024-09-26 NOTE — PROGRESS NOTES
Assessment:   Diagnosis ICD-10-CM Associated Orders   1. Primary osteoarthritis of left knee  M17.12           Plan:  69 y.o. female with left knee osteoarthritis.  The patient was offered, accepted, and received a corticosteroid injection to her left knee today. Aspiration of the knee yielded 40 cc of serous clear fluid.    Patient advised she may resume meloxicam when symptoms return following injection.  Patient may continue activity as tolerated, weightbearing as tolerated.  Patient may follow-up in 3 months for repeat injection.    The above stated was discussed in layman's terms and the patient expressed understanding.  All questions were answered to the patient's satisfaction.     To do next visit:  Return in about 3 months (around 12/26/2024).      Subjective:   Iva Reid is a 69 y.o. female who presents to follow up her left knee pain. She states her Durolane injection did not help. Her previous corticosteroid injection was far more helpful for her pain. She also notes that Meloxicam helps her pain but she has not taken it as she is uncertain whether she can take it long term or not.      Review of systems negative unless otherwise specified in HPI    Past Medical History:   Diagnosis Date    Anxiety     Hematuria     Hypertension     Irregular heart beat     Kidney stone May 2021       Past Surgical History:   Procedure Laterality Date    BILATERAL SALPINGOOPHORECTOMY      last assessed 8/11/14    BREAST BIOPSY Left     EXCISIONAL BIOPSY BENIGN    CHOLECYSTECTOMY  2009    COLONOSCOPY  10/31/2022    HYSTERECTOMY  2004    KNEE SURGERY      OOPHORECTOMY Bilateral 2004    ((Pt Qnr Sub: decrease kidney function))        Family History   Problem Relation Age of Onset    Arthritis Mother     Irregular heart beat Mother     Hypertension Mother     Hyperlipidemia Mother     Cancer Father         Bladder - cancer treated. No longer has.    Stroke Father     Arthritis Father     ROOPA disease Sister     Ulcerative  colitis Daughter     No Known Problems Son     Breast cancer Maternal Grandmother 80    No Known Problems Maternal Grandfather     Dementia Paternal Grandmother     Obesity Paternal Grandmother     Emphysema Paternal Grandfather     Heart attack Paternal Grandfather     ROOPA disease Sister     Breast cancer Maternal Aunt 70    Breast cancer Maternal Aunt 70    No Known Problems Maternal Aunt     Uterine cancer Cousin 40    No Known Problems Paternal Aunt     No Known Problems Paternal Aunt        Social History     Occupational History    Not on file   Tobacco Use    Smoking status: Never    Smokeless tobacco: Never   Vaping Use    Vaping status: Never Used   Substance and Sexual Activity    Alcohol use: No    Drug use: No    Sexual activity: Not Currently     Partners: Male     Birth control/protection: Female Sterilization         Current Outpatient Medications:     meloxicam (MOBIC) 15 mg tablet, , Disp: , Rfl:     ALPRAZolam (XANAX) 0.5 mg tablet, Take by mouth as needed, Disp: , Rfl:     amLODIPine-benazepril (LOTREL 5-20) 5-20 MG per capsule, , Disp: , Rfl:     Artificial Tear Ointment (DRY EYES OP), Apply to eye OPTASE (Patient not taking: Reported on 11/14/2022), Disp: , Rfl:     Carboxymethylcellul-Glycerin (Refresh Optive) 1-0.9 % GEL, , Disp: , Rfl:     cephalexin (KEFLEX) 500 mg capsule, TAKE 4 CAPS 1 HOUR PRIOR TO DENTAL APPT, Disp: , Rfl:     Cholecalciferol (Vitamin D3) 50 MCG (2000 UT) capsule, , Disp: , Rfl:     DHA-EPA-Flaxseed Oil-Vitamin E (THERA TEARS) CAPS, Take by mouth, Disp: , Rfl:     Durolane 60 MG/3ML injection, , Disp: , Rfl:     escitalopram (LEXAPRO) 10 mg tablet, Take 10 mg by mouth daily, Disp: , Rfl:     estradiol (ESTRACE) 0.1 mg/g vaginal cream, Insert 1 g into the vagina 2 (two) times a week, Disp: 42.5 g, Rfl: 1    estradiol (ESTRACE) 0.1 mg/g vaginal cream, Insert 1 g into the vagina 2 (two) times a week, Disp: 42.5 g, Rfl: 3    glycerin-hypromellose- (ARTIFICIAL TEARS)  "0.2-0.2-1 % SOLN, Optase OTC UAD (Patient not taking: Reported on 11/14/2022), Disp: , Rfl:     meloxicam (MOBIC) 7.5 mg tablet, Take 1 tablet by mouth daily as needed, Disp: , Rfl:     metoprolol succinate (TOPROL-XL) 25 mg 24 hr tablet, TAKE 1 TABLET BY MOUTH EVERY DAY, Disp: 90 tablet, Rfl: 0    metoprolol succinate (TOPROL-XL) 25 mg 24 hr tablet, Take 1 tablet (25 mg total) by mouth daily, Disp: 90 tablet, Rfl: 0    metoprolol succinate (TOPROL-XL) 25 mg 24 hr tablet, TAKE 1 TABLET BY MOUTH EVERY DAY, Disp: 90 tablet, Rfl: 0    nystatin-triamcinolone (MYCOLOG-II) cream, Apply topically 2 (two) times a day, Disp: 60 g, Rfl: 2    tetrahydrozoline-zinc (VISINE-AC) 0.05-0.25 % ophthalmic solution, Optase eye drops., Disp: , Rfl:     No Known Allergies         Vitals:    09/26/24 1510   BP: 148/83   Pulse: 68       Objective:  Physical exam  General: Awake, Alert, Oriented  Eyes: Pupils equal, round and reactive to light  Heart: regular rate and rhythm  Lungs: No audible wheezing  Abdomen: soft    Ortho Exam    Diagnostics, reviewed and taken today if performed as documented:    None performed      Procedures, if performed today:    Large joint arthrocentesis: L knee  Universal Protocol:  procedure performed by consultantConsent: Verbal consent obtained.  Risks and benefits: risks, benefits and alternatives were discussed  Consent given by: patient  Time out: Immediately prior to procedure a \"time out\" was called to verify the correct patient, procedure, equipment, support staff and site/side marked as required.  Patient understanding: patient states understanding of the procedure being performed  Patient consent: the patient's understanding of the procedure matches consent given  Patient identity confirmed: verbally with patient  Supporting Documentation  Indications: pain   Procedure Details  Location: knee - L knee  Preparation: Patient was prepped and draped in the usual sterile fashion  Needle size: 18 " "G  Approach: superolateral.  Medications administered: 12 mg betamethasone acetate-betamethasone sodium phosphate 6 (3-3) mg/mL; 2 mL ropivacaine 0.2 %; 2 mL bupivacaine 0.25 %    Aspirate amount: 40 mL  Aspirate: serous and clear  Patient tolerance: patient tolerated the procedure well with no immediate complications  Dressing:  Sterile dressing applied          Portions of the record may have been created with voice recognition software.  Occasional wrong word or \"sound a like\" substitutions may have occurred due to the inherent limitations of voice recognition software.  Read the chart carefully and recognize, using context, where substitutions have occurred.        "

## 2024-10-25 ENCOUNTER — HOSPITAL ENCOUNTER (OUTPATIENT)
Dept: NON INVASIVE DIAGNOSTICS | Facility: CLINIC | Age: 69
Discharge: HOME | End: 2024-10-25
Payer: COMMERCIAL

## 2024-10-25 DIAGNOSIS — M79.605 LEFT LEG PAIN: ICD-10-CM

## 2024-10-25 PROCEDURE — 93971 EXTREMITY STUDY: CPT | Performed by: SURGERY

## 2024-10-25 PROCEDURE — 93971 EXTREMITY STUDY: CPT

## 2024-12-03 ENCOUNTER — OFFICE VISIT (OUTPATIENT)
Dept: OBGYN CLINIC | Facility: HOSPITAL | Age: 69
End: 2024-12-03
Payer: COMMERCIAL

## 2024-12-03 VITALS — HEIGHT: 62 IN | BODY MASS INDEX: 30.25 KG/M2 | WEIGHT: 164.4 LBS

## 2024-12-03 DIAGNOSIS — M25.462 EFFUSION OF LEFT KNEE: ICD-10-CM

## 2024-12-03 DIAGNOSIS — M25.562 CHRONIC PAIN OF LEFT KNEE: ICD-10-CM

## 2024-12-03 DIAGNOSIS — M54.16 LEFT LUMBAR RADICULOPATHY: ICD-10-CM

## 2024-12-03 DIAGNOSIS — M17.12 PRIMARY OSTEOARTHRITIS OF LEFT KNEE: Primary | ICD-10-CM

## 2024-12-03 DIAGNOSIS — G89.29 CHRONIC PAIN OF LEFT KNEE: ICD-10-CM

## 2024-12-03 PROCEDURE — 99213 OFFICE O/P EST LOW 20 MIN: CPT | Performed by: ORTHOPAEDIC SURGERY

## 2024-12-03 PROCEDURE — 20610 DRAIN/INJ JOINT/BURSA W/O US: CPT | Performed by: ORTHOPAEDIC SURGERY

## 2024-12-03 RX ORDER — LIDOCAINE HYDROCHLORIDE 10 MG/ML
4 INJECTION, SOLUTION INFILTRATION; PERINEURAL
Status: COMPLETED | OUTPATIENT
Start: 2024-12-03 | End: 2024-12-03

## 2024-12-03 RX ORDER — BUPIVACAINE HYDROCHLORIDE 2.5 MG/ML
4 INJECTION, SOLUTION INFILTRATION; PERINEURAL
Status: COMPLETED | OUTPATIENT
Start: 2024-12-03 | End: 2024-12-03

## 2024-12-03 RX ORDER — TRIAMCINOLONE ACETONIDE 40 MG/ML
80 INJECTION, SUSPENSION INTRA-ARTICULAR; INTRAMUSCULAR
Status: COMPLETED | OUTPATIENT
Start: 2024-12-03 | End: 2024-12-03

## 2024-12-03 RX ADMIN — LIDOCAINE HYDROCHLORIDE 4 ML: 10 INJECTION, SOLUTION INFILTRATION; PERINEURAL at 15:45

## 2024-12-03 RX ADMIN — BUPIVACAINE HYDROCHLORIDE 4 ML: 2.5 INJECTION, SOLUTION INFILTRATION; PERINEURAL at 15:45

## 2024-12-03 RX ADMIN — TRIAMCINOLONE ACETONIDE 80 MG: 40 INJECTION, SUSPENSION INTRA-ARTICULAR; INTRAMUSCULAR at 15:45

## 2024-12-03 NOTE — PROGRESS NOTES
Assessment:   Diagnosis ICD-10-CM Associated Orders   1. Primary osteoarthritis of left knee  M17.12 Large joint arthrocentesis: L knee     Ambulatory Referral to Physical Therapy      2. Effusion of left knee  M25.462 Large joint arthrocentesis: L knee     Ambulatory Referral to Physical Therapy      3. Chronic pain of left knee  M25.562 Large joint arthrocentesis: L knee    G89.29 Ambulatory Referral to Physical Therapy      4. Left lumbar radiculopathy  M54.16 Ambulatory Referral to Physical Therapy          Plan:  Diagnosis, treatment options and associated risks were discussed with the patient including no treatment, nonsurgical treatment and potential for surgical intervention.  The patient was given the opportunity to ask questions regarding each.   Quality life decision to pursue elective left total knee arthroplasty however she wishes to wait until March/April when she is on Medicare.  In the interim she was offered, accepted, performed an aspiration injection of cortisone to her left knee today for symptomatic relief.  She tolerated the procedure well.  Compression wrap applied which may be removed later this evening.  Ice and postinjection protocol advised.  Weightbearing and activities as tolerated.  Referral placed to physical therapy for lower extremity strengthening, core exercises, etc as she is also experiencing symptoms of lumbar stenosis  Follow-up in 2 months for repeat evaluation and potentially schedule elective left total knee arthroplasty for March/April at her request    To do next visit:  Return in about 2 months (around 2/3/2025) for re-check left knee.    The above stated was discussed in layman's terms and the patient expressed understanding.  All questions were answered to the patient's satisfaction.       Scribe Attestation      I,:  Milton Mtz am acting as a scribe while in the presence of the attending physician.:       I,:  Mynor Sarabia MD personally performed the  services described in this documentation    as scribed in my presence.:               Subjective:   Iva Reid is a 69 y.o. female who presents today for repeat evaluation of her left knee due to return of pain and swelling.  She has known advanced osteoarthritis with recurrent knee effusions.  She has had aspirations with injections of Durolane 8/29 as well as aspiration with injection of cortisone 9/26 with minimal lasting relief.  She finds that her left knee symptoms can limit her day-to-day activities as well as impedes on her quality of life.  She is interested in pursuing a left total knee arthroplasty but not until March/April when she is on Medicare.  At times her symptoms can be severe and unrelenting.  She has increased medial knee pain with weightbearing activities.  She is stiffness getting up with the prolonged sedentary positions.  7/10 pain scale or greater at times.  She does have chronic lower back pain with burning/tingling sensation laterally at her left thigh but does not radiate distal to her knee.  Those symptoms present with standing or walking but feels improved with pushing a shopping cart.    History of right total knee arthroplasty May 2015 and doing well    Review of systems negative unless otherwise specified in HPI  Review of Systems    Past Medical History:   Diagnosis Date    Anxiety     Hematuria     Hypertension     Irregular heart beat     Kidney stone May 2021       Past Surgical History:   Procedure Laterality Date    BILATERAL SALPINGOOPHORECTOMY      last assessed 8/11/14    BREAST BIOPSY Left     EXCISIONAL BIOPSY BENIGN    CHOLECYSTECTOMY  2009    COLONOSCOPY  10/31/2022    HYSTERECTOMY  2004    KNEE SURGERY      OOPHORECTOMY Bilateral 2004    ((Pt Qnr Sub: decrease kidney function))        Family History   Problem Relation Age of Onset    Arthritis Mother     Irregular heart beat Mother     Hypertension Mother     Hyperlipidemia Mother     Cancer Father         Bladder  - cancer treated. No longer has.    Stroke Father     Arthritis Father     ROOPA disease Sister     Ulcerative colitis Daughter     No Known Problems Son     Breast cancer Maternal Grandmother 80    No Known Problems Maternal Grandfather     Dementia Paternal Grandmother     Obesity Paternal Grandmother     Emphysema Paternal Grandfather     Heart attack Paternal Grandfather     ROOPA disease Sister     Breast cancer Maternal Aunt 70    Breast cancer Maternal Aunt 70    No Known Problems Maternal Aunt     Uterine cancer Cousin 40    No Known Problems Paternal Aunt     No Known Problems Paternal Aunt        Social History     Occupational History    Not on file   Tobacco Use    Smoking status: Never    Smokeless tobacco: Never   Vaping Use    Vaping status: Never Used   Substance and Sexual Activity    Alcohol use: No    Drug use: No    Sexual activity: Not Currently     Partners: Male     Birth control/protection: Female Sterilization         Current Outpatient Medications:     ALPRAZolam (XANAX) 0.5 mg tablet, Take by mouth as needed, Disp: , Rfl:     amLODIPine-benazepril (LOTREL 5-20) 5-20 MG per capsule, , Disp: , Rfl:     Carboxymethylcellul-Glycerin (Refresh Optive) 1-0.9 % GEL, , Disp: , Rfl:     cephalexin (KEFLEX) 500 mg capsule, TAKE 4 CAPS 1 HOUR PRIOR TO DENTAL APPT, Disp: , Rfl:     Cholecalciferol (Vitamin D3) 50 MCG (2000 UT) capsule, , Disp: , Rfl:     DHA-EPA-Flaxseed Oil-Vitamin E (THERA TEARS) CAPS, Take by mouth, Disp: , Rfl:     escitalopram (LEXAPRO) 10 mg tablet, Take 10 mg by mouth daily, Disp: , Rfl:     estradiol (ESTRACE) 0.1 mg/g vaginal cream, Insert 1 g into the vagina 2 (two) times a week, Disp: 42.5 g, Rfl: 1    meloxicam (MOBIC) 7.5 mg tablet, Take 1 tablet by mouth daily as needed, Disp: , Rfl:     metoprolol succinate (TOPROL-XL) 25 mg 24 hr tablet, TAKE 1 TABLET BY MOUTH EVERY DAY, Disp: 90 tablet, Rfl: 0    nystatin-triamcinolone (MYCOLOG-II) cream, Apply topically 2 (two) times a  day, Disp: 60 g, Rfl: 2    Artificial Tear Ointment (DRY EYES OP), Apply to eye OPTASE (Patient not taking: Reported on 12/3/2024), Disp: , Rfl:     Durolane 60 MG/3ML injection, , Disp: , Rfl:     estradiol (ESTRACE) 0.1 mg/g vaginal cream, Insert 1 g into the vagina 2 (two) times a week (Patient not taking: Reported on 12/3/2024), Disp: 42.5 g, Rfl: 3    glycerin-hypromellose- (ARTIFICIAL TEARS) 0.2-0.2-1 % SOLN, Optase OTC UAD (Patient not taking: Reported on 12/3/2024), Disp: , Rfl:     meloxicam (MOBIC) 15 mg tablet, , Disp: , Rfl:     metoprolol succinate (TOPROL-XL) 25 mg 24 hr tablet, Take 1 tablet (25 mg total) by mouth daily (Patient not taking: Reported on 12/3/2024), Disp: 90 tablet, Rfl: 0    metoprolol succinate (TOPROL-XL) 25 mg 24 hr tablet, TAKE 1 TABLET BY MOUTH EVERY DAY (Patient not taking: Reported on 12/3/2024), Disp: 90 tablet, Rfl: 0    tetrahydrozoline-zinc (VISINE-AC) 0.05-0.25 % ophthalmic solution, Optase eye drops. (Patient not taking: Reported on 12/3/2024), Disp: , Rfl:     No Known Allergies         Vitals:       Body mass index is 30.07 kg/m².  Wt Readings from Last 3 Encounters:   12/03/24 74.6 kg (164 lb 6.4 oz)   09/26/24 74.8 kg (165 lb)   08/29/24 74.4 kg (164 lb)       Objective:                    Left Knee Exam     Muscle Strength   The patient has normal left knee strength.    Tenderness   The patient is experiencing tenderness in the medial joint line.    Range of Motion   Extension:  5   Flexion:  110 (With crepitation limited by stiffness)     Other   Erythema: absent  Sensation: normal  Swelling: mild  Effusion: effusion present    Comments:    Varus alignment with bony enlargement medially.            Diagnostics, reviewed and taken today if performed as documented:    None performed          Procedures, if performed today:    Large joint arthrocentesis: L knee  Universal Protocol:  Consent: Verbal consent obtained.  Risks and benefits: risks, benefits and  "alternatives were discussed  Consent given by: patient  Time out: Immediately prior to procedure a \"time out\" was called to verify the correct patient, procedure, equipment, support staff and site/side marked as required.  Timeout called at: 12/3/2024 4:17 PM.  Patient understanding: patient states understanding of the procedure being performed  Site marked: the operative site was marked  Patient identity confirmed: verbally with patient  Supporting Documentation  Indications: pain and diagnostic evaluation   Procedure Details  Location: knee - L knee  Preparation: Patient was prepped and draped in the usual sterile fashion  Needle size: 18 G  Ultrasound guidance: no  Approach: supralateral.  Medications administered: 4 mL lidocaine 1 %; 4 mL bupivacaine 0.25 %; 80 mg triamcinolone acetonide 40 mg/mL    Aspirate amount: 55 mL  Aspirate: clear, serous and yellow  Patient tolerance: patient tolerated the procedure well with no immediate complications  Dressing:  Sterile dressing applied (compression wrap applied)            Portions of the record may have been created with voice recognition software.  Occasional wrong word or \"sound a like\" substitutions may have occurred due to the inherent limitations of voice recognition software.  Read the chart carefully and recognize, using context, where substitutions have occurred.  "

## 2024-12-16 ENCOUNTER — OFFICE VISIT (OUTPATIENT)
Dept: CARDIOLOGY CLINIC | Facility: CLINIC | Age: 69
End: 2024-12-16
Payer: COMMERCIAL

## 2024-12-16 VITALS
BODY MASS INDEX: 29.63 KG/M2 | DIASTOLIC BLOOD PRESSURE: 70 MMHG | SYSTOLIC BLOOD PRESSURE: 120 MMHG | HEIGHT: 62 IN | WEIGHT: 161 LBS | HEART RATE: 68 BPM

## 2024-12-16 DIAGNOSIS — Z01.810 PREOP CARDIOVASCULAR EXAM: ICD-10-CM

## 2024-12-16 DIAGNOSIS — I49.1 PAC (PREMATURE ATRIAL CONTRACTION): ICD-10-CM

## 2024-12-16 DIAGNOSIS — I10 PRIMARY HYPERTENSION: Primary | ICD-10-CM

## 2024-12-16 PROCEDURE — 93000 ELECTROCARDIOGRAM COMPLETE: CPT | Performed by: INTERNAL MEDICINE

## 2024-12-16 PROCEDURE — 99213 OFFICE O/P EST LOW 20 MIN: CPT | Performed by: INTERNAL MEDICINE

## 2024-12-16 NOTE — PROGRESS NOTES
Cardiology Follow Up    Iva Reid  1955  [id number]  [facility] CARDIAC CATH LAB  [address] [address]  [phone]    1. Primary hypertension        2. PAC (premature atrial contraction)        3. Preop cardiovascular exam            Interval History: Cardiology follow-up.  Patient was last seen 2 years ago.  Preop cardiac clearance for knee surgery.  Patient continues to experience palpitations, no frequent.  Short usually worsen with anxiety.  She is on low-dose beta-blocker therapy..  No syncope or presyncope denies any chest pain or dyspnea.  .  States compliant with low-cholesterol diet, no recent lipid profile, lipids last checked 107 LDL within HDL 51.  Last CMP was normal normal electrolytes.  Scheduled for knee surgery next year.  Patient needs clearance    Patient Active Problem List   Diagnosis    Preop cardiovascular exam    Vaginal atrophy    Hypertension    PAC (premature atrial contraction)    Functional diarrhea    Angiomyolipoma of both kidneys    Nephrolithiasis     Past Medical History:   Diagnosis Date    Anxiety     Hematuria     Hypertension     Irregular heart beat     Kidney stone May 2021     Social History     Socioeconomic History    Marital status: /Civil Union     Spouse name: Not on file    Number of children: Not on file    Years of education: Not on file    Highest education level: Not on file   Occupational History    Not on file   Tobacco Use    Smoking status: Never    Smokeless tobacco: Never   Vaping Use    Vaping status: Never Used   Substance and Sexual Activity    Alcohol use: No    Drug use: No    Sexual activity: Not Currently     Partners: Male     Birth control/protection: Female Sterilization   Other Topics Concern    Not on file   Social History Narrative    Not on file     Social Drivers of Health     Financial Resource Strain: Not on file   Food Insecurity: Not  on file   Transportation Needs: Not on file   Physical Activity: Not on file   Stress: Not on file   Social Connections: Not on file   Intimate Partner Violence: Not on file   Housing Stability: Not on file      Family History   Problem Relation Age of Onset    Arthritis Mother     Irregular heart beat Mother     Hypertension Mother     Hyperlipidemia Mother     Cancer Father         Bladder - cancer treated. No longer has.    Stroke Father     Arthritis Father     ROOPA disease Sister     Ulcerative colitis Daughter     No Known Problems Son     Breast cancer Maternal Grandmother 80    No Known Problems Maternal Grandfather     Dementia Paternal Grandmother     Obesity Paternal Grandmother     Emphysema Paternal Grandfather     Heart attack Paternal Grandfather     ROOPA disease Sister     Breast cancer Maternal Aunt 70    Breast cancer Maternal Aunt 70    No Known Problems Maternal Aunt     Uterine cancer Cousin 40    No Known Problems Paternal Aunt     No Known Problems Paternal Aunt      Past Surgical History:   Procedure Laterality Date    BILATERAL SALPINGOOPHORECTOMY      last assessed 8/11/14    BREAST BIOPSY Left     EXCISIONAL BIOPSY BENIGN    CHOLECYSTECTOMY  2009    COLONOSCOPY  10/31/2022    HYSTERECTOMY  2004    KNEE SURGERY      OOPHORECTOMY Bilateral 2004    ((Pt Qnr Sub: decrease kidney function))        Current Outpatient Medications:     ALPRAZolam (XANAX) 0.5 mg tablet, Take by mouth as needed, Disp: , Rfl:     amLODIPine-benazepril (LOTREL 5-20) 5-20 MG per capsule, , Disp: , Rfl:     cephalexin (KEFLEX) 500 mg capsule, TAKE 4 CAPS 1 HOUR PRIOR TO DENTAL APPT, Disp: , Rfl:     Cholecalciferol (Vitamin D3) 50 MCG (2000 UT) capsule, , Disp: , Rfl:     DHA-EPA-Flaxseed Oil-Vitamin E (THERA TEARS) CAPS, Take by mouth, Disp: , Rfl:     escitalopram (LEXAPRO) 10 mg tablet, Take 10 mg by mouth daily, Disp: , Rfl:     estradiol (ESTRACE) 0.1 mg/g vaginal cream, Insert 1 g into the vagina 2 (two) times a  week (Patient taking differently: Insert 1 g into the vagina daily Once a week), Disp: 42.5 g, Rfl: 3    meloxicam (MOBIC) 15 mg tablet, , Disp: , Rfl:     metoprolol succinate (TOPROL-XL) 25 mg 24 hr tablet, TAKE 1 TABLET BY MOUTH EVERY DAY, Disp: 90 tablet, Rfl: 0    nystatin-triamcinolone (MYCOLOG-II) cream, Apply topically 2 (two) times a day, Disp: 60 g, Rfl: 2    Artificial Tear Ointment (DRY EYES OP), Apply to eye OPTASE (Patient not taking: Reported on 12/3/2024), Disp: , Rfl:     Carboxymethylcellul-Glycerin (Refresh Optive) 1-0.9 % GEL, , Disp: , Rfl:     Durolane 60 MG/3ML injection, , Disp: , Rfl:     estradiol (ESTRACE) 0.1 mg/g vaginal cream, Insert 1 g into the vagina 2 (two) times a week, Disp: 42.5 g, Rfl: 1    glycerin-hypromellose- (ARTIFICIAL TEARS) 0.2-0.2-1 % SOLN, Optase OTC UAD (Patient not taking: Reported on 12/3/2024), Disp: , Rfl:     meloxicam (MOBIC) 7.5 mg tablet, Take 1 tablet by mouth daily as needed, Disp: , Rfl:     metoprolol succinate (TOPROL-XL) 25 mg 24 hr tablet, TAKE 1 TABLET BY MOUTH EVERY DAY, Disp: 90 tablet, Rfl: 0    metoprolol succinate (TOPROL-XL) 25 mg 24 hr tablet, Take 1 tablet (25 mg total) by mouth daily (Patient not taking: Reported on 12/3/2024), Disp: 90 tablet, Rfl: 0    tetrahydrozoline-zinc (VISINE-AC) 0.05-0.25 % ophthalmic solution, Optase eye drops. (Patient not taking: Reported on 12/3/2024), Disp: , Rfl:   No Known Allergies    Labs:  No visits with results within 6 Month(s) from this visit.   Latest known visit with results is:   Lab Requisition on 10/31/2022   Component Date Value    Case Report 10/31/2022                      Value:Surgical Pathology Report                         Case: [id number]                                   Authorizing Provider:  MELISSA Avila MD       Collected:           10/31/2022 1036              Ordering Location:     [facility]      Received:            10/31/2022 1337                                 "[facility]                                                                                  Laboratory                                                                   Pathologist:           Kyle Fox MD                                                           Specimens:   A) - Colon, Random                                                                                  B) - Polyp, Colorectal, Sigmoid colon                                                      Final Diagnosis 10/31/2022                      Value:A. Colon, \"Random colon biopsy,\" Biopsy:                          - Benign colonic mucosa with intact glandular architecture                          - Negative for lymphocytic, collagenous, or active colitis                          - Negative for acute colitis                          - Negative for granulomas, dysplasia, or carcinoma                                                    B. Colon, “Sigmoid colon polyp,” Biopsy:                          - Hyperplastic polyp                          - Negative for dysplasia or carcinoma                              Additional Information 10/31/2022                      Value:All reported additional testing was performed with appropriately reactive                           controls.  These tests were developed and their performance                           characteristics determined by [facility] or                           appropriate performing facility, though some tests may be performed on                           tissues which have not been validated for performance characteristics                           (such as staining performed on alcohol exposed cell blocks and decalcified                           tissues).  Results should be interpreted with caution and in the context                           of the patients’ clinical condition. These tests may not be cleared or                           approved by " "the U.S. Food and Drug Administration, though the FDA has                           determined that such clearance or approval is not necessary. These tests                           are used for clinical purposes and they should not be regarded as                           investigational or for research. This laboratory has been approved by CLIA                           88, designated as a high-complexity laboratory and is qualified to perform                           these tests.                          .Interpretation performed at [facility], [address] [address]                              Synoptic Checklist 10/31/2022                      Value:                            COLON/RECTUM POLYP FORM - GI - B                                                                                     :    Other      Gross Description 10/31/2022                      Value:                           A. The specimen is received in formalin, labeled with the patient's name                           and hospital number, and is designated \"random colon biopsy\".  The                           specimen consists of multiple tan irregularly-shaped tissue fragments                           measuring in aggregate of 0.9 x 0.3 x 0.2 cm.  The specimen is entirely                           submitted in a screened cassette.                                                                                  B. The specimen is received in formalin, labeled with the patient's name                           and hospital number, and is designated \"sigmoid colon polyp\".  The                           specimen consists of multiple tan irregularly-shaped tissue fragments                           measuring in aggregate of 1.3 x 0.7 x 0.1 cm.  The specimen is entirely                           submitted in a screened cassette.                                                    Note: The estimated " total formalin fixation time based upon information                           provided by the submitting clinician and the standard processing schedule                           is under 72 hours.  Rebecca Lieberman                                    Imaging: No results found.    Review of Systems:  Review of Systems   Respiratory:  Negative for apnea, shortness of breath, wheezing and stridor.    Cardiovascular:  Positive for palpitations. Negative for chest pain and leg swelling.   Musculoskeletal:  Positive for arthralgias. Negative for gait problem.   Neurological:  Negative for dizziness, syncope and weakness.   Hematological:  Does not bruise/bleed easily.       Physical Exam:  Physical Exam  Vitals reviewed.   Constitutional:       General: She is not in acute distress.     Appearance: Normal appearance. She is obese. She is not ill-appearing, toxic-appearing or diaphoretic.   Neck:      Vascular: No carotid bruit.   Cardiovascular:      Rate and Rhythm: Normal rate and regular rhythm.      Pulses: Normal pulses.      Heart sounds: Normal heart sounds. No murmur heard.     No friction rub. No gallop.   Pulmonary:      Effort: Pulmonary effort is normal. No respiratory distress.      Breath sounds: Normal breath sounds. No stridor. No wheezing, rhonchi or rales.   Musculoskeletal:      Right lower leg: No edema.      Left lower leg: No edema.   Skin:     General: Skin is warm and dry.      Capillary Refill: Capillary refill takes less than 2 seconds.      Coloration: Skin is not jaundiced or pale.      Findings: No bruising or erythema.   Neurological:      Mental Status: She is alert and oriented to person, place, and time.   Psychiatric:         Mood and Affect: Mood normal.         Discussion/Summary:  Palpitations , history of moderate supraventricular ectopic activity no complex.  No structural heart disease by clinical examination and noninvasive assessment.  Previous stress test 2017, she did 9 minutes  of Fredrick protocol with no echocardiographic criteria for ischemia or EKG echocardiogram was also normal.   Holter monitor 2022, revealed normal sinus rhythm with a 17% PAC burden.  No sustained tachyarrhythmias.  No atrial fibrillation.  Repeat stress test and echo and monitor.  Cardiac clearance pending the results of the testing..  Regular exercise and stress management recommended.       This note was completed in part utilizing [employer] direct voice recognition software.   Grammatical errors, random word insertion, spelling mistakes, and incomplete sentences may be an occasional consequence of the system secondary to software limitations, ambient noise and hardware issues. At the time of dictation, efforts were made to edit, clarify and /or correct errors.  Please read the chart carefully and recognize, using context, where substitutions have occurred.  If you have any questions or concerns about the context, text or information contained within the body of this dictation, please contact myself, the provider, for further clarification.

## 2024-12-29 DIAGNOSIS — I10 ESSENTIAL HYPERTENSION: ICD-10-CM

## 2024-12-30 ENCOUNTER — TELEPHONE (OUTPATIENT)
Age: 69
End: 2024-12-30

## 2024-12-30 NOTE — TELEPHONE ENCOUNTER
Caller: Iva Reid    Doctor: Dr. Jonathan Robert    Reason for call: Patient called stating that she is scheduled for a stress test on 1/10/2025. She is calling because after looking up what is done for a stress test, she doesn't feel she'd be able to complete it. The patient said that she is having knee surgery soon, and that knee has been more painful than usual lately. She is wondering if there is another way she can do the stress test or if she can just cancel it completely. Please advise and let the patient know.    Call back#: [phone]

## 2024-12-31 DIAGNOSIS — Z01.810 PREOP CARDIOVASCULAR EXAM: ICD-10-CM

## 2024-12-31 DIAGNOSIS — I49.1 PAC (PREMATURE ATRIAL CONTRACTION): Primary | ICD-10-CM

## 2024-12-31 RX ORDER — METOPROLOL SUCCINATE 25 MG/1
25 TABLET, EXTENDED RELEASE ORAL DAILY
Qty: 90 TABLET | Refills: 1 | Status: SHIPPED | OUTPATIENT
Start: 2024-12-31

## 2025-01-04 ENCOUNTER — HOSPITAL ENCOUNTER (OUTPATIENT)
Dept: RADIOLOGY | Age: 70
Discharge: HOME | End: 2025-01-04
Payer: COMMERCIAL

## 2025-01-04 VITALS — BODY MASS INDEX: 29.63 KG/M2 | HEIGHT: 62 IN | WEIGHT: 161 LBS

## 2025-01-04 DIAGNOSIS — Z12.31 VISIT FOR SCREENING MAMMOGRAM: ICD-10-CM

## 2025-01-04 PROCEDURE — 77067 SCR MAMMO BI INCL CAD: CPT

## 2025-01-04 PROCEDURE — 77063 BREAST TOMOSYNTHESIS BI: CPT

## 2025-01-28 ENCOUNTER — HOSPITAL ENCOUNTER (OUTPATIENT)
Dept: NON INVASIVE DIAGNOSTICS | Facility: CLINIC | Age: 70
Discharge: HOME | End: 2025-01-28
Payer: COMMERCIAL

## 2025-01-28 VITALS
HEIGHT: 62 IN | HEART RATE: 68 BPM | WEIGHT: 161 LBS | BODY MASS INDEX: 29.63 KG/M2 | SYSTOLIC BLOOD PRESSURE: 120 MMHG | DIASTOLIC BLOOD PRESSURE: 70 MMHG

## 2025-01-28 VITALS
SYSTOLIC BLOOD PRESSURE: 140 MMHG | DIASTOLIC BLOOD PRESSURE: 78 MMHG | BODY MASS INDEX: 29.63 KG/M2 | HEIGHT: 62 IN | WEIGHT: 161 LBS

## 2025-01-28 DIAGNOSIS — I49.1 PAC (PREMATURE ATRIAL CONTRACTION): ICD-10-CM

## 2025-01-28 DIAGNOSIS — Z01.810 PREOP CARDIOVASCULAR EXAM: ICD-10-CM

## 2025-01-28 DIAGNOSIS — I10 PRIMARY HYPERTENSION: ICD-10-CM

## 2025-01-28 LAB
AORTIC ROOT: 3 CM
ASCENDING AORTA: 3.2 CM
CHEST PAIN STATEMENT: NORMAL
E WAVE DECELERATION TIME: 225 MS
E/A RATIO: 0.84
FRACTIONAL SHORTENING: 48 (ref 28–44)
INTERVENTRICULAR SEPTUM IN DIASTOLE (PARASTERNAL SHORT AXIS VIEW): 1 CM
INTERVENTRICULAR SEPTUM: 1 CM (ref 0.6–1.1)
LAAS-AP2: 17.6 CM2
LAAS-AP4: 17.1 CM2
LEFT ATRIUM SIZE: 3.8 CM
LEFT ATRIUM VOLUME (MOD BIPLANE): 50 ML
LEFT ATRIUM VOLUME INDEX (MOD BIPLANE): 28.7 ML/M2
LEFT INTERNAL DIMENSION IN SYSTOLE: 2.2 CM (ref 2.1–4)
LEFT VENTRICULAR INTERNAL DIMENSION IN DIASTOLE: 4.2 CM (ref 3.5–6)
LEFT VENTRICULAR POSTERIOR WALL IN END DIASTOLE: 0.9 CM
LEFT VENTRICULAR STROKE VOLUME: 65 ML
LV EF US.2D.A4C+ESTIMATED: 58 %
LVSV (TEICH): 65 ML
MAX DIASTOLIC BP: 82 MMHG
MAX PREDICTED HEART RATE: 152 BPM
MV E'TISSUE VEL-SEP: 9 CM/S
MV PEAK A VEL: 0.91 M/S
MV PEAK E VEL: 76 CM/S
MV STENOSIS PRESSURE HALF TIME: 65 MS
MV VALVE AREA P 1/2 METHOD: 3.38
PROTOCOL NAME: NORMAL
RA PRESSURE ESTIMATED: 3 MMHG
RATE PRESSURE PRODUCT: NORMAL
REASON FOR TERMINATION: NORMAL
RIGHT ATRIAL 2D VOLUME: 41 ML
RIGHT ATRIUM AREA SYSTOLE A4C: 15.7 CM2
RIGHT VENTRICLE ID DIMENSION: 3.4 CM
RV PSP: 32 MMHG
SL CV LEFT ATRIUM LENGTH A2C: 5.4 CM
SL CV LV EF: 65
SL CV PED ECHO LEFT VENTRICLE DIASTOLIC VOLUME (MOD BIPLANE) 2D: 80 ML
SL CV PED ECHO LEFT VENTRICLE SYSTOLIC VOLUME (MOD BIPLANE) 2D: 15 ML
SL CV REST NUCLEAR ISOTOPE DOSE: 10.4 MCI
SL CV STRESS NUCLEAR ISOTOPE DOSE: 32.4 MCI
SL CV STRESS RECOVERY BP: NORMAL MMHG
SL CV STRESS RECOVERY HR: 77 BPM
SL CV STRESS RECOVERY O2 SAT: 99 %
SPECT HRT GATED+EF W RNC IV: 75 %
STRESS ANGINA INDEX: 0
STRESS BASELINE BP: NORMAL MMHG
STRESS BASELINE HR: 60 BPM
STRESS O2 SAT REST: 99 %
STRESS PEAK HR: 96 BPM
STRESS POST EXERCISE DUR MIN: 3 MIN
STRESS POST EXERCISE DUR SEC: 0 SEC
STRESS POST O2 SAT PEAK: 96 %
STRESS POST PEAK BP: 120 MMHG
STRESS POST PEAK HR: 98 BPM
STRESS POST PEAK SYSTOLIC BP: 144 MMHG
STRESS/REST PERFUSION RATIO: 0.76
TARGET HR FORMULA: NORMAL
TEST INDICATION: NORMAL
TR MAX PG: 29 MMHG
TR PEAK VELOCITY: 2.7 M/S
TRICUSPID ANNULAR PLANE SYSTOLIC EXCURSION: 2 CM
TRICUSPID VALVE PEAK REGURGITATION VELOCITY: 2.67 M/S

## 2025-01-28 PROCEDURE — 93017 CV STRESS TEST TRACING ONLY: CPT

## 2025-01-28 PROCEDURE — 93306 TTE W/DOPPLER COMPLETE: CPT

## 2025-01-28 PROCEDURE — 78452 HT MUSCLE IMAGE SPECT MULT: CPT | Performed by: STUDENT IN AN ORGANIZED HEALTH CARE EDUCATION/TRAINING PROGRAM

## 2025-01-28 PROCEDURE — 93016 CV STRESS TEST SUPVJ ONLY: CPT | Performed by: STUDENT IN AN ORGANIZED HEALTH CARE EDUCATION/TRAINING PROGRAM

## 2025-01-28 PROCEDURE — 93018 CV STRESS TEST I&R ONLY: CPT | Performed by: STUDENT IN AN ORGANIZED HEALTH CARE EDUCATION/TRAINING PROGRAM

## 2025-01-28 PROCEDURE — A9502 TC99M TETROFOSMIN: HCPCS

## 2025-01-28 PROCEDURE — 78452 HT MUSCLE IMAGE SPECT MULT: CPT

## 2025-01-28 PROCEDURE — 93225 XTRNL ECG REC<48 HRS REC: CPT

## 2025-01-28 PROCEDURE — 93226 XTRNL ECG REC<48 HR SCAN A/R: CPT

## 2025-01-28 PROCEDURE — 93306 TTE W/DOPPLER COMPLETE: CPT | Performed by: STUDENT IN AN ORGANIZED HEALTH CARE EDUCATION/TRAINING PROGRAM

## 2025-01-28 RX ORDER — REGADENOSON 0.08 MG/ML
0.4 INJECTION, SOLUTION INTRAVENOUS ONCE
Status: COMPLETED | OUTPATIENT
Start: 2025-01-28 | End: 2025-01-28

## 2025-01-28 RX ADMIN — REGADENOSON 0.4 MG: 0.08 INJECTION, SOLUTION INTRAVENOUS at 13:31

## 2025-02-03 PROCEDURE — 93227 XTRNL ECG REC<48 HR R&I: CPT | Performed by: STUDENT IN AN ORGANIZED HEALTH CARE EDUCATION/TRAINING PROGRAM

## 2025-02-03 RX ORDER — GLYCERIN 1 MG/ML
SPRAY OPHTHALMIC
COMMUNITY
Start: 2019-02-01

## 2025-02-03 RX ORDER — AMOXICILLIN AND CLAVULANATE POTASSIUM 500; 125 MG/1; MG/1
TABLET, FILM COATED ORAL
COMMUNITY
Start: 2025-01-07

## 2025-02-04 ENCOUNTER — OFFICE VISIT (OUTPATIENT)
Dept: OBGYN CLINIC | Facility: HOSPITAL | Age: 70
End: 2025-02-04
Payer: COMMERCIAL

## 2025-02-04 VITALS — BODY MASS INDEX: 30 KG/M2 | HEIGHT: 62 IN | WEIGHT: 163 LBS

## 2025-02-04 DIAGNOSIS — M25.462 EFFUSION OF LEFT KNEE: ICD-10-CM

## 2025-02-04 DIAGNOSIS — G89.29 CHRONIC PAIN OF LEFT KNEE: Primary | ICD-10-CM

## 2025-02-04 DIAGNOSIS — M25.562 CHRONIC PAIN OF LEFT KNEE: Primary | ICD-10-CM

## 2025-02-04 DIAGNOSIS — M17.12 PRIMARY OSTEOARTHRITIS OF LEFT KNEE: ICD-10-CM

## 2025-02-04 PROCEDURE — 99214 OFFICE O/P EST MOD 30 MIN: CPT | Performed by: ORTHOPAEDIC SURGERY

## 2025-02-04 PROCEDURE — 20610 DRAIN/INJ JOINT/BURSA W/O US: CPT | Performed by: ORTHOPAEDIC SURGERY

## 2025-02-04 RX ORDER — LIDOCAINE HYDROCHLORIDE 10 MG/ML
2 INJECTION, SOLUTION INFILTRATION; PERINEURAL
Status: COMPLETED | OUTPATIENT
Start: 2025-02-04 | End: 2025-02-04

## 2025-02-04 RX ORDER — KETOROLAC TROMETHAMINE 30 MG/ML
60 INJECTION, SOLUTION INTRAMUSCULAR; INTRAVENOUS
Status: COMPLETED | OUTPATIENT
Start: 2025-02-04 | End: 2025-02-04

## 2025-02-04 RX ORDER — MUPIROCIN 20 MG/G
OINTMENT TOPICAL 2 TIMES DAILY
Qty: 15 G | Refills: 0 | Status: SHIPPED | OUTPATIENT
Start: 2025-02-04

## 2025-02-04 RX ORDER — CHLORHEXIDINE GLUCONATE ORAL RINSE 1.2 MG/ML
15 SOLUTION DENTAL ONCE
OUTPATIENT
Start: 2025-02-04 | End: 2025-02-04

## 2025-02-04 RX ORDER — BUPIVACAINE HYDROCHLORIDE 2.5 MG/ML
2 INJECTION, SOLUTION INFILTRATION; PERINEURAL
Status: COMPLETED | OUTPATIENT
Start: 2025-02-04 | End: 2025-02-04

## 2025-02-04 RX ORDER — FOLIC ACID 1 MG/1
1 TABLET ORAL DAILY
Qty: 30 TABLET | Refills: 0 | Status: SHIPPED | OUTPATIENT
Start: 2025-02-04

## 2025-02-04 RX ORDER — TRANEXAMIC ACID 10 MG/ML
1000 INJECTION, SOLUTION INTRAVENOUS ONCE
OUTPATIENT
Start: 2025-02-04 | End: 2025-02-04

## 2025-02-04 RX ORDER — ENOXAPARIN SODIUM 100 MG/ML
40 INJECTION SUBCUTANEOUS DAILY
Qty: 11.2 ML | Refills: 0 | Status: SHIPPED | OUTPATIENT
Start: 2025-02-04 | End: 2025-03-04

## 2025-02-04 RX ORDER — CEFAZOLIN SODIUM 2 G/50ML
2000 SOLUTION INTRAVENOUS ONCE
OUTPATIENT
Start: 2025-02-04 | End: 2025-02-04

## 2025-02-04 RX ORDER — SODIUM CHLORIDE, SODIUM LACTATE, POTASSIUM CHLORIDE, CALCIUM CHLORIDE 600; 310; 30; 20 MG/100ML; MG/100ML; MG/100ML; MG/100ML
125 INJECTION, SOLUTION INTRAVENOUS CONTINUOUS
OUTPATIENT
Start: 2025-02-04

## 2025-02-04 RX ORDER — FERROUS SULFATE 324(65)MG
324 TABLET, DELAYED RELEASE (ENTERIC COATED) ORAL
Qty: 60 TABLET | Refills: 0 | Status: SHIPPED | OUTPATIENT
Start: 2025-02-04

## 2025-02-04 RX ORDER — ASCORBIC ACID 500 MG
500 TABLET ORAL DAILY
Qty: 30 TABLET | Refills: 0 | Status: SHIPPED | OUTPATIENT
Start: 2025-02-04

## 2025-02-04 RX ADMIN — KETOROLAC TROMETHAMINE 60 MG: 30 INJECTION, SOLUTION INTRAMUSCULAR; INTRAVENOUS at 13:30

## 2025-02-04 RX ADMIN — LIDOCAINE HYDROCHLORIDE 2 ML: 10 INJECTION, SOLUTION INFILTRATION; PERINEURAL at 13:30

## 2025-02-04 RX ADMIN — BUPIVACAINE HYDROCHLORIDE 2 ML: 2.5 INJECTION, SOLUTION INFILTRATION; PERINEURAL at 13:30

## 2025-02-04 NOTE — PROGRESS NOTES
Assessment:  1. Chronic pain of left knee        2. Primary osteoarthritis of left knee        3. Effusion of left knee            Plan:  Left knee osteoarthritis  The patient was provided with left knee Toradol injection following aspiration of 71ml fluid.  The patient tolerated the procedure well.    The patient will be scheduled for left total knee arthroplasty.  We feel the patient will find significant relief per current symptoms, findings on imaging and exam.  Risks, benefits, precautions and expectations were discussed. Risks include blood loss, potential infection and blood clots.  Preoperative vitamins were prescribed.     The patient should follow up after surgery.        To do next visit:  Return for post-op with x-rays.    The above stated was discussed in layman's terms and the patient expressed understanding.  All questions were answered to the patient's satisfaction.       Scribe Attestation      I,:  Praveen Day MA am acting as a scribe while in the presence of the attending physician.:       I,:  Mynor Sarabia MD personally performed the services described in this documentation    as scribed in my presence.:               Subjective:   Iva Reid is a 69 y.o. female who presents for follow up of left knee.  She is s/p left knee steroid injection/aspiration with about 6 weeks of benefit, 12/3/2024.  Today she complains of return of severe left knee pain and swelling.  Weight bearing aggravates while rest alleviates.  She has use Mobic 15mg with benefit.  She denies past left knee surgery.  She has history of right TKA in 2015.      Review of systems negative unless otherwise specified in HPI    Past Medical History:   Diagnosis Date    Anxiety     Hematuria     Hypertension     Irregular heart beat     Kidney stone May 2021       Past Surgical History:   Procedure Laterality Date    BILATERAL SALPINGOOPHORECTOMY      last assessed 8/11/14    BREAST BIOPSY Left     EXCISIONAL BIOPSY  BENIGN    CHOLECYSTECTOMY  2009    COLONOSCOPY  10/31/2022    HYSTERECTOMY  2004    KNEE SURGERY      OOPHORECTOMY Bilateral 2004    ((Pt Qnr Sub: decrease kidney function))        Family History   Problem Relation Age of Onset    Arthritis Mother     Irregular heart beat Mother     Hypertension Mother     Hyperlipidemia Mother     Cancer Father         Bladder - cancer treated. No longer has.    Stroke Father     Arthritis Father     ROOPA disease Sister     ROOPA disease Sister     Ulcerative colitis Daughter     Breast cancer Maternal Grandmother 80    No Known Problems Maternal Grandfather     Dementia Paternal Grandmother     Obesity Paternal Grandmother     Emphysema Paternal Grandfather     Heart attack Paternal Grandfather     No Known Problems Son     Breast cancer Maternal Aunt 70    Breast cancer Maternal Aunt 70    No Known Problems Maternal Aunt     No Known Problems Paternal Aunt     No Known Problems Paternal Aunt     Uterine cancer Cousin 40       Social History     Occupational History    Not on file   Tobacco Use    Smoking status: Never    Smokeless tobacco: Never   Vaping Use    Vaping status: Never Used   Substance and Sexual Activity    Alcohol use: No    Drug use: No    Sexual activity: Not Currently     Partners: Male     Birth control/protection: Female Sterilization         Current Outpatient Medications:     amoxicillin-clavulanate (AUGMENTIN) 500-125 mg per tablet, take 1 tablet by oral route twice daily with food, Disp: , Rfl:     Glycerin, PF, (Optase Dry Eye Intense) 0.2 % SOLN, , Disp: , Rfl:     ALPRAZolam (XANAX) 0.5 mg tablet, Take by mouth as needed, Disp: , Rfl:     amLODIPine-benazepril (LOTREL 5-20) 5-20 MG per capsule, , Disp: , Rfl:     Carboxymethylcellul-Glycerin (Refresh Optive) 1-0.9 % GEL, , Disp: , Rfl:     cephalexin (KEFLEX) 500 mg capsule, TAKE 4 CAPS 1 HOUR PRIOR TO DENTAL APPT, Disp: , Rfl:     Cholecalciferol (Vitamin D3) 50 MCG (2000 UT) capsule, , Disp: , Rfl:      "DHA-EPA-Flaxseed Oil-Vitamin E (THERA TEARS) CAPS, Take by mouth, Disp: , Rfl:     Durolane 60 MG/3ML injection, , Disp: , Rfl:     escitalopram (LEXAPRO) 10 mg tablet, Take 10 mg by mouth daily, Disp: , Rfl:     estradiol (ESTRACE) 0.1 mg/g vaginal cream, Insert 1 g into the vagina 2 (two) times a week (Patient taking differently: Insert 1 g into the vagina daily Once a week), Disp: 42.5 g, Rfl: 3    meloxicam (MOBIC) 15 mg tablet, , Disp: , Rfl:     metoprolol succinate (TOPROL-XL) 25 mg 24 hr tablet, TAKE 1 TABLET BY MOUTH EVERY DAY, Disp: 90 tablet, Rfl: 1    nystatin-triamcinolone (MYCOLOG-II) cream, Apply topically 2 (two) times a day, Disp: 60 g, Rfl: 2    No Known Allergies       There were no vitals filed for this visit.    Objective:  Physical exam  General: Awake, Alert, Oriented  Eyes: Pupils equal, round and reactive to light  Heart: regular rate and rhythm  Lungs: No audible wheezing  Abdomen: soft                    Ortho Exam  Left knee:  Varus alignment  TTP over medial joint line  No erythema or ecchymosis  No effusion or swelling  Normal strength  Good ROM with crepitus   Calf compartments soft and supple  Sensation intact  Toes are warm sensate and mobile      Diagnostics, reviewed and taken today if performed as documented:    None performed     Procedures, if performed today:    Large joint arthrocentesis: L knee  Universal Protocol:  Consent: Verbal consent obtained.  Risks and benefits: risks, benefits and alternatives were discussed  Consent given by: patient  Time out: Immediately prior to procedure a \"time out\" was called to verify the correct patient, procedure, equipment, support staff and site/side marked as required.  Timeout called at: 2/4/2025 1:41 PM.  Patient understanding: patient states understanding of the procedure being performed  Site marked: the operative site was marked  Patient identity confirmed: verbally with patient  Supporting Documentation  Indications: pain " "  Procedure Details  Location: knee - L knee  Preparation: Patient was prepped and draped in the usual sterile fashion  Needle size: 22 G  Ultrasound guidance: no  Approach: anterolateral  Medications administered: 2 mL bupivacaine 0.25 %; 2 mL lidocaine 1 %; 60 mg ketorolac 60 mg/2 mL    Aspirate amount: 71 mL  Aspirate: clear and yellow  Patient tolerance: patient tolerated the procedure well with no immediate complications  Dressing:  Sterile dressing applied              Portions of the record may have been created with voice recognition software.  Occasional wrong word or \"sound a like\" substitutions may have occurred due to the inherent limitations of voice recognition software.  Read the chart carefully and recognize, using context, where substitutions have occurred.    "

## 2025-03-05 DIAGNOSIS — N95.2 VAGINAL ATROPHY: ICD-10-CM

## 2025-03-05 NOTE — TELEPHONE ENCOUNTER
Reason for call:   [x] Refill   [] Prior Auth  [] Other:     Office:   [] PCP/Provider -   [x] Specialty/Provider - Marilou mantilla      Medication:   estradiol    Pharmacy:   [facility] [address], [place]    Does the patient have enough for 3 days?   [x] Yes   [] No - Send as HP to POD

## 2025-03-06 RX ORDER — ESTRADIOL 0.1 MG/G
1 CREAM VAGINAL 2 TIMES WEEKLY
Qty: 42.5 G | Refills: 3 | Status: SHIPPED | OUTPATIENT
Start: 2025-03-06

## 2025-03-24 ENCOUNTER — TELEPHONE (OUTPATIENT)
Age: 70
End: 2025-03-24

## 2025-03-24 NOTE — TELEPHONE ENCOUNTER
Caller: Iva    Doctor: Dr. Sarabia    Reason for call: Patient was told PCP is unable to help her because she is not under their for her knee.    Call back#: [phone]

## 2025-03-24 NOTE — TELEPHONE ENCOUNTER
Caller: Patient     Call back#: [phone]    Best call back time am/pm/all day: leaving at 10:15am and will return around 12ish.    Doctor: Dr Sarabia    Surgery: no    Date of Surgery: 5/5/25    Reason for call: Patient stated she can barely walk. She is having a lot of left knee pain and swelling. Surgery is on 5/5/25 and she is asking if there is anything she can do to relief the pain.       Urgent matters - Please Teams message Nurse Navigator Team Chat & send phone note to Orthopedic Nurse Navigator Pool as high priority before transferring call.   Non-Urgent matters - Please send a phone note to Orthopedic Nurse Navigator Pool only. Nurse Navigators will call patients back asap.

## 2025-03-27 NOTE — TELEPHONE ENCOUNTER
Patient calling back because she hadn't heard anything about getting an appt - advised was scheduled for 4/1/25 with Dr. Sarabia aspiration and an injection of Toradol

## 2025-03-31 ENCOUNTER — TELEPHONE (OUTPATIENT)
Dept: OBGYN CLINIC | Facility: HOSPITAL | Age: 70
End: 2025-03-31

## 2025-04-01 ENCOUNTER — OFFICE VISIT (OUTPATIENT)
Dept: OBGYN CLINIC | Facility: HOSPITAL | Age: 70
End: 2025-04-01
Payer: MEDICARE

## 2025-04-01 VITALS — BODY MASS INDEX: 29.81 KG/M2 | HEIGHT: 62 IN | WEIGHT: 162 LBS

## 2025-04-01 DIAGNOSIS — M25.462 EFFUSION OF LEFT KNEE: ICD-10-CM

## 2025-04-01 DIAGNOSIS — M25.562 CHRONIC PAIN OF LEFT KNEE: Primary | ICD-10-CM

## 2025-04-01 DIAGNOSIS — G89.29 CHRONIC PAIN OF LEFT KNEE: Primary | ICD-10-CM

## 2025-04-01 DIAGNOSIS — M17.12 PRIMARY OSTEOARTHRITIS OF LEFT KNEE: ICD-10-CM

## 2025-04-01 PROCEDURE — 20610 DRAIN/INJ JOINT/BURSA W/O US: CPT | Performed by: ORTHOPAEDIC SURGERY

## 2025-04-01 PROCEDURE — 99213 OFFICE O/P EST LOW 20 MIN: CPT | Performed by: ORTHOPAEDIC SURGERY

## 2025-04-01 RX ORDER — KETOROLAC TROMETHAMINE 30 MG/ML
60 INJECTION, SOLUTION INTRAMUSCULAR; INTRAVENOUS
Status: COMPLETED | OUTPATIENT
Start: 2025-04-01 | End: 2025-04-01

## 2025-04-01 RX ORDER — LIDOCAINE HYDROCHLORIDE 10 MG/ML
2 INJECTION, SOLUTION INFILTRATION; PERINEURAL
Status: COMPLETED | OUTPATIENT
Start: 2025-04-01 | End: 2025-04-01

## 2025-04-01 RX ADMIN — KETOROLAC TROMETHAMINE 60 MG: 30 INJECTION, SOLUTION INTRAMUSCULAR; INTRAVENOUS at 13:45

## 2025-04-01 RX ADMIN — LIDOCAINE HYDROCHLORIDE 2 ML: 10 INJECTION, SOLUTION INFILTRATION; PERINEURAL at 13:45

## 2025-04-01 NOTE — PROGRESS NOTES
Name: Iva Reid      : 1955       MRN: [id number]   Encounter Provider: Mynor Sarabia MD   Encounter Date: 25  Encounter department: [facility]     ASSESSMENT & PLAN:  Assessment & Plan  Chronic pain of left knee  Adult female scheduled to have her left knee replaced in a month as onset of weightbearing pain and large effusion.  All diagnoses were discussed with the patient.  Treatment option including risk, benefits, terms were discussed in detail.  An aspiration injection of Toradol is indicated.  It is advised, accepted, and administered as outlined above.  Office follow-up as a postoperative patient's my final recommendation       Primary osteoarthritis of left knee         Effusion of left knee              To do next visit:  No follow-ups on file.    _____________________________________________________  CHIEF COMPLAINT:  Chief Complaint   Patient presents with    Left Knee - Follow-up         SUBJECTIVE:  Iva Reid is a 70 y.o. female who presents for evaluation of weightbearing pain and pain at rest in her left knee that is reached excruciating levels.  She is scheduled for knee replaced early May, due to onset of weightbearing pain she presents today.  She reach out to her primary care doc who declined her request for pain medicine.  She has pain level of left knee joint, the pain is made worse bearing weight and the pain increases with increased activities.  Pain scores are at least a 7 out of 10 in the left knee        PAST MEDICAL HISTORY:  Past Medical History:   Diagnosis Date    Anxiety     Hematuria     Hypertension     Irregular heart beat     Kidney stone May 2021       PAST SURGICAL HISTORY:  Past Surgical History:   Procedure Laterality Date    BILATERAL SALPINGOOPHORECTOMY      last assessed 14    BREAST BIOPSY Left     EXCISIONAL BIOPSY BENIGN    CHOLECYSTECTOMY  2009    COLONOSCOPY  10/31/2022    HYSTERECTOMY  2004     KNEE SURGERY      OOPHORECTOMY Bilateral 2004    ((Pt Qnr Sub: decrease kidney function))        FAMILY HISTORY:  Family History   Problem Relation Age of Onset    Arthritis Mother     Irregular heart beat Mother     Hypertension Mother     Hyperlipidemia Mother     Cancer Father         Bladder - cancer treated. No longer has.    Stroke Father     Arthritis Father     ROOPA disease Sister     ROOPA disease Sister     Ulcerative colitis Daughter     Breast cancer Maternal Grandmother 80    No Known Problems Maternal Grandfather     Dementia Paternal Grandmother     Obesity Paternal Grandmother     Emphysema Paternal Grandfather     Heart attack Paternal Grandfather     No Known Problems Son     Breast cancer Maternal Aunt 70    Breast cancer Maternal Aunt 70    No Known Problems Maternal Aunt     No Known Problems Paternal Aunt     No Known Problems Paternal Aunt     Uterine cancer Cousin 40       SOCIAL HISTORY:  Social History     Tobacco Use    Smoking status: Never    Smokeless tobacco: Never   Vaping Use    Vaping status: Never Used   Substance Use Topics    Alcohol use: No    Drug use: No       MEDICATIONS:    Current Outpatient Medications:     ALPRAZolam (XANAX) 0.5 mg tablet, Take by mouth as needed, Disp: , Rfl:     amLODIPine-benazepril (LOTREL 5-20) 5-20 MG per capsule, , Disp: , Rfl:     amoxicillin-clavulanate (AUGMENTIN) 500-125 mg per tablet, take 1 tablet by oral route twice daily with food, Disp: , Rfl:     ascorbic acid (VITAMIN C) 500 MG tablet, Take 1 tablet (500 mg total) by mouth daily Start 30 days prior to surgery, Disp: 30 tablet, Rfl: 0    Carboxymethylcellul-Glycerin (Refresh Optive) 1-0.9 % GEL, , Disp: , Rfl:     cephalexin (KEFLEX) 500 mg capsule, TAKE 4 CAPS 1 HOUR PRIOR TO DENTAL APPT, Disp: , Rfl:     Cholecalciferol (Vitamin D3) 50 MCG (2000 UT) capsule, , Disp: , Rfl:     DHA-EPA-Flaxseed Oil-Vitamin E (THERA TEARS) CAPS, Take by mouth, Disp: , Rfl:     Durolane 60 MG/3ML injection, ,  "Disp: , Rfl:     enoxaparin (LOVENOX) 40 mg/0.4 mL, Inject 0.4 mL (40 mg total) under the skin daily for 28 days Start injections after surgery, Disp: 11.2 mL, Rfl: 0    escitalopram (LEXAPRO) 10 mg tablet, Take 10 mg by mouth daily, Disp: , Rfl:     estradiol (ESTRACE) 0.1 mg/g vaginal cream, Insert 1 g into the vagina 2 (two) times a week, Disp: 42.5 g, Rfl: 3    ferrous sulfate 324 (65 Fe) mg, Take 1 tablet (324 mg total) by mouth daily before breakfast Start 30 days prior to surgery, Disp: 60 tablet, Rfl: 0    folic acid (FOLVITE) 1 mg tablet, Take 1 tablet (1 mg total) by mouth daily Start 30 days prior to surgery, Disp: 30 tablet, Rfl: 0    Glycerin, PF, (Optase Dry Eye Intense) 0.2 % SOLN, , Disp: , Rfl:     meloxicam (MOBIC) 15 mg tablet, , Disp: , Rfl:     metoprolol succinate (TOPROL-XL) 25 mg 24 hr tablet, TAKE 1 TABLET BY MOUTH EVERY DAY, Disp: 90 tablet, Rfl: 1    mupirocin (BACTROBAN) 2 % ointment, Apply topically 2 (two) times a day Apply pea size amount to each nostril 2x/day for 5 days prior to procedure, Disp: 15 g, Rfl: 0    nystatin-triamcinolone (MYCOLOG-II) cream, Apply topically 2 (two) times a day, Disp: 60 g, Rfl: 2    ALLERGIES:  No Known Allergies    LABS:  HgA1c: No results found for: \"HGBA1C\"  BMP:   Lab Results   Component Value Date    GLUCOSE 83 04/28/2015    CALCIUM 9.3 05/01/2021     04/28/2015    K 4.6 05/01/2021    CO2 26 05/01/2021     (H) 05/01/2021    BUN 13 05/01/2021    CREATININE 0.88 05/01/2021     CBC: No components found for: \"CBC\"    _____________________________________________________  PHYSICAL EXAMINATION:  Vital signs: Ht 5' 2\" (1.575 m)   Wt 73.5 kg (162 lb)   BMI 29.63 kg/m²   General: No acute distress, awake and alert  Psychiatric: Mood and affect appear appropriate  HEENT: Trachea Midline, No torticollis, no apparent facial trauma  Cardiovascular: No audible murmurs; Extremities appear perfused  Pulmonary: No audible wheezing or stridor  Skin: No " open lesions; see further details (if any) below    MUSCULOSKELETAL EXAMINATION:  Ortho Exam  Gait pattern is antalgic despite use of a single-point cane.  Left knee is in varus.  There is a large effusion occur without warmth.  There is no tenderness left calf.  The foot and toes strongly dorsiflex    ___________________________________________________  STUDIES REVIEWED:  I personally reviewed the images obtained in office today and my independent interpretation is as follows:    None performed      PROCEDURES PERFORMED:    Large joint arthrocentesis: L knee  Universal Protocol:  Consent given by: patient  Supporting Documentation  Indications: pain   Procedure Details  Location: knee - L knee  Needle size: 22 G  Medications administered: 2 mL lidocaine 1 %; 60 mg ketorolac 60 mg/2 mL    Patient tolerance: patient tolerated the procedure well with no immediate complications  Dressing:  Sterile dressing applied          None preformed       Mynor Sarabia MD

## 2025-04-06 ENCOUNTER — TELEPHONE (OUTPATIENT)
Dept: OTHER | Facility: OTHER | Age: 70
End: 2025-04-06

## 2025-04-06 NOTE — TELEPHONE ENCOUNTER
"Pt stated, \" I went to schedule an appointment for my labs that I need done for my upcoming surgery. The girl that I spoke with said she was concerned about my MRSA culture because she said it is usually done closer to the appointment date. I was told to have all my labs done on 4/7/2025.\"      Please follow up, thank you.  "

## 2025-04-07 ENCOUNTER — APPOINTMENT (OUTPATIENT)
Dept: LAB | Facility: CLINIC | Age: 70
End: 2025-04-07
Payer: MEDICARE

## 2025-04-07 DIAGNOSIS — M17.12 PRIMARY OSTEOARTHRITIS OF LEFT KNEE: ICD-10-CM

## 2025-04-07 DIAGNOSIS — G89.29 CHRONIC PAIN OF LEFT KNEE: ICD-10-CM

## 2025-04-07 DIAGNOSIS — M25.562 CHRONIC PAIN OF LEFT KNEE: ICD-10-CM

## 2025-04-07 LAB
ALBUMIN SERPL BCG-MCNC: 4.2 G/DL (ref 3.5–5)
ALP SERPL-CCNC: 53 U/L (ref 34–104)
ALT SERPL W P-5'-P-CCNC: 12 U/L (ref 7–52)
ANION GAP SERPL CALCULATED.3IONS-SCNC: 10 MMOL/L (ref 4–13)
APTT PPP: 29 SECONDS (ref 23–34)
AST SERPL W P-5'-P-CCNC: 15 U/L (ref 13–39)
BASOPHILS # BLD AUTO: 0.02 THOUSANDS/ÂΜL (ref 0–0.1)
BASOPHILS NFR BLD AUTO: 0 % (ref 0–1)
BILIRUB SERPL-MCNC: 1.7 MG/DL (ref 0.2–1)
BUN SERPL-MCNC: 12 MG/DL (ref 5–25)
CALCIUM SERPL-MCNC: 9.1 MG/DL (ref 8.4–10.2)
CHLORIDE SERPL-SCNC: 105 MMOL/L (ref 96–108)
CO2 SERPL-SCNC: 27 MMOL/L (ref 21–32)
CREAT SERPL-MCNC: 0.61 MG/DL (ref 0.6–1.3)
EOSINOPHIL # BLD AUTO: 0.11 THOUSAND/ÂΜL (ref 0–0.61)
EOSINOPHIL NFR BLD AUTO: 1 % (ref 0–6)
ERYTHROCYTE [DISTWIDTH] IN BLOOD BY AUTOMATED COUNT: 11.9 % (ref 11.6–15.1)
EST. AVERAGE GLUCOSE BLD GHB EST-MCNC: 120 MG/DL
GFR SERPL CREATININE-BSD FRML MDRD: 92 ML/MIN/1.73SQ M
GLUCOSE P FAST SERPL-MCNC: 93 MG/DL (ref 65–99)
HBA1C MFR BLD: 5.8 %
HCT VFR BLD AUTO: 42.4 % (ref 34.8–46.1)
HGB BLD-MCNC: 13.5 G/DL (ref 11.5–15.4)
IMM GRANULOCYTES # BLD AUTO: 0.02 THOUSAND/UL (ref 0–0.2)
IMM GRANULOCYTES NFR BLD AUTO: 0 % (ref 0–2)
INR PPP: 0.97 (ref 0.85–1.19)
LYMPHOCYTES # BLD AUTO: 1.82 THOUSANDS/ÂΜL (ref 0.6–4.47)
LYMPHOCYTES NFR BLD AUTO: 22 % (ref 14–44)
MCH RBC QN AUTO: 30.6 PG (ref 26.8–34.3)
MCHC RBC AUTO-ENTMCNC: 31.8 G/DL (ref 31.4–37.4)
MCV RBC AUTO: 96 FL (ref 82–98)
MONOCYTES # BLD AUTO: 0.35 THOUSAND/ÂΜL (ref 0.17–1.22)
MONOCYTES NFR BLD AUTO: 4 % (ref 4–12)
NEUTROPHILS # BLD AUTO: 5.82 THOUSANDS/ÂΜL (ref 1.85–7.62)
NEUTS SEG NFR BLD AUTO: 73 % (ref 43–75)
NRBC BLD AUTO-RTO: 0 /100 WBCS
PLATELET # BLD AUTO: 290 THOUSANDS/UL (ref 149–390)
PMV BLD AUTO: 10.8 FL (ref 8.9–12.7)
POTASSIUM SERPL-SCNC: 3.7 MMOL/L (ref 3.5–5.3)
PROT SERPL-MCNC: 7.1 G/DL (ref 6.4–8.4)
PROTHROMBIN TIME: 13.2 SECONDS (ref 12.3–15)
RBC # BLD AUTO: 4.41 MILLION/UL (ref 3.81–5.12)
SODIUM SERPL-SCNC: 142 MMOL/L (ref 135–147)
WBC # BLD AUTO: 8.14 THOUSAND/UL (ref 4.31–10.16)

## 2025-04-07 PROCEDURE — 80053 COMPREHEN METABOLIC PANEL: CPT

## 2025-04-07 PROCEDURE — 87081 CULTURE SCREEN ONLY: CPT

## 2025-04-07 PROCEDURE — 85610 PROTHROMBIN TIME: CPT

## 2025-04-07 PROCEDURE — 85730 THROMBOPLASTIN TIME PARTIAL: CPT

## 2025-04-07 PROCEDURE — 36415 COLL VENOUS BLD VENIPUNCTURE: CPT

## 2025-04-07 PROCEDURE — 83036 HEMOGLOBIN GLYCOSYLATED A1C: CPT

## 2025-04-07 PROCEDURE — 85025 COMPLETE CBC W/AUTO DIFF WBC: CPT

## 2025-04-08 PROBLEM — Z01.810 PREOP CARDIOVASCULAR EXAM: Status: RESOLVED | Noted: 2018-09-21 | Resolved: 2025-04-08

## 2025-04-08 PROBLEM — M17.12 PRIMARY OSTEOARTHRITIS OF LEFT KNEE: Status: ACTIVE | Noted: 2025-04-08

## 2025-04-08 LAB — MRSA NOSE QL CULT: NORMAL

## 2025-04-08 RX ORDER — CYCLOBENZAPRINE HCL 5 MG
TABLET ORAL
COMMUNITY
Start: 2025-04-03

## 2025-04-08 NOTE — PROGRESS NOTES
Internal Medicine Pre-Operative Evaluation:     Reason for Visit: Pre-operative Evaluation for Risk Stratification and Optimization    Patient ID: Iva Reid is a 70 y.o. female.     Case: [id number] Date/Time: 05/05/25 1025   Procedure: ARTHROPLASTY KNEE TOTAL W ROBOT (Left: Knee)   Anesthesia type: Choice   Diagnosis:      Chronic pain of left knee [M25.562, G89.29]      Primary osteoarthritis of left knee [M17.12]      Effusion of left knee [M25.462]   Pre-op diagnosis:      Chronic pain of left knee [M25.562, G89.29]      Primary osteoarthritis of left knee [M17.12]      Effusion of left knee [M25.462]   Location:  OR ROOM 04 / [facility]   Surgeons: Mynor Sarabia MD         Recommendations to Proceed withSurgery    Patient is considered to be Low risk for Medium risk procedure.     After evaluation and discussion with patient with emphasis that all surgery has some degree of inherent risk it is acknowledged by patient this risk is Acceptable.    Patient is optimized and may proceed with planned procedure.     Assessment    Pre-operative Medical Evaluation for planned surgery  Recommendations as listed in PLAN section below  Contact surgical nurse  navigator with any questions regarding preoperative plan or schedule.      Assessment & Plan  Preoperative clearance    Primary osteoarthritis of left knee  Failed outpatient conservative measures  Electing to undergo surgical procedure as stated above    Primary hypertension  Stable   Refer to PAT instructions regarding medication administration the morning of surgery             Plan:     1. Further preoperative workup as follows:   - none no further testing required may proceed with surgery    2. Preoperative Medication Management Review performed by [facility] nursing  NO 4.16.2025    3. Patient requires further consultation with:   No Consults Required    4. Discharge Planning / Barriers to Discharge  none  identified - patients has post discharge therapy plan in place, transportation arranged for discharge day, adequate family support at home to assist with discharge to home.        Subjective:           History of Present Illness:     Iva Reid is a 70 y.o. female who presents to the office today for a preoperative consultation at the request of surgeon. The patient understands this is an elective procedure and not emergent. They are electing to undergo planned procedure with an understanding that all surgery has inherent risk. They have worked with their surgeon and failed conservative treatment measures. Today they present for preoperative risk assessment and recommendations for optimization in preparation for surgery.    Pt seen in center for perioperative medicine for upcoming proposed surgery. They have failed previous conservative measures and have elected surgical intervention.     Pt meets presurgical lab and BMI optimization goals.    Pt had cardiac clearance with full work up that was negative.       Iva Reid has an IN HOSPITAL cardiac risk of RCI RISK CLASS I (0 risk factors, risk of major cardiac compl. appr. 0.5%) based on RCRI calculator    Cardiac Risk Estimation: per the Revised Cardiac Risk Index (Circ. 100:1043, 1999),         Pre-op Exam    Previous history of bleeding disorders or clots?: No  Previous Anesthesia reaction?: No  Prolonged steroid use in the last 6 months?: No    Assessment of Cardiac Risk:   - Unstable or severe angina or MI in the last 6 weeks or history of stent placement in the last year?: No   - Decompensated heart failure (e.g. New onset heart failure, NYHA  Class IV heart failure, or worsening existing heart failure)?: No  - Significant arrhythmias such as high grade AV block, symptomatic ventricular arrhythmia, newly recognized ventricular tachycardia, supraventricular tachycardia with resting heart rate >100, or symptomatic bradycardia?: No  - Severe heart valve  "disease including aortic stenosis or symptomatic mitral stenosis?: No      Pre-operative Risk Factors:  Elevated-risk surgery: No    History of cerebrovascular disease: No    History of ischemic heart disease: No  Pre-operative treatment with insulin: No  Pre-operative creatinine >2 mg/dL: No    History of congestive heart failure: No    Duke Activity Status Index (DASI):   DASI Total Score: 18.95  METs: 5.1        ROS: No TIA's or unusual headaches, no dysphagia.  No prolonged cough. No dyspnea or chest pain on exertion.  No abdominal pain, change in bowel habits, black or bloody stools.  No urinary tract or BPH symptoms.  Positive reported pain in arthritic joint. Positive difficulty with gait. No skin rashes or issues.      Objective:    /80   Pulse 77   Ht 5' 2\" (1.575 m)   Wt 73.5 kg (162 lb)   SpO2 97%   BMI 29.63 kg/m²       General Appearance: no distress, conversive  HEENT: PERRLA, conjuctiva normal; oropharynx clear; mucous membranes moist;   Neck:  Supple, no lymphadenopathy or thyromegaly  Lungs: breath sounds normal, normal respiratory effort, no retractions, expiratory effort normal  CV: normal heart sounds S1/S2, PMI normal   ABD: soft non tender, +BSx4  EXT: DP pulses intact, no lymphadenopathy, no edema  Skin: normal turgor, normal texture, no rash  Psych: affect normal, mood normal  Neuro: AAOx3        The following portions of the patient's history were reviewed and updated as appropriate: allergies, current medications, past family history, past medical history, past social history, past surgical history and problem list.     Past History:       Past Medical History:   Diagnosis Date    Anxiety     Hematuria     Hypertension     Irregular heart beat     Kidney stone May 2021    Past Surgical History:   Procedure Laterality Date    BILATERAL SALPINGOOPHORECTOMY      last assessed 8/11/14    BREAST BIOPSY Left     EXCISIONAL BIOPSY BENIGN    CHOLECYSTECTOMY  2009    COLONOSCOPY  " 10/31/2022    HYSTERECTOMY  2004    KNEE SURGERY      OOPHORECTOMY Bilateral 2004    ((Pt Qnr Sub: decrease kidney function))           Social History     Tobacco Use    Smoking status: Never    Smokeless tobacco: Never   Vaping Use    Vaping status: Never Used   Substance Use Topics    Alcohol use: No    Drug use: No     Family History   Problem Relation Age of Onset    Arthritis Mother     Irregular heart beat Mother     Hypertension Mother     Hyperlipidemia Mother     Cancer Father         Bladder - cancer treated. No longer has.    Stroke Father     Arthritis Father     ROOPA disease Sister     ROOPA disease Sister     Ulcerative colitis Daughter     Breast cancer Maternal Grandmother 80    No Known Problems Maternal Grandfather     Dementia Paternal Grandmother     Obesity Paternal Grandmother     Emphysema Paternal Grandfather     Heart attack Paternal Grandfather     No Known Problems Son     Breast cancer Maternal Aunt 70    Breast cancer Maternal Aunt 70    No Known Problems Maternal Aunt     No Known Problems Paternal Aunt     No Known Problems Paternal Aunt     Uterine cancer Cousin 40          Allergies:     No Known Allergies     Current Medications:     Current Outpatient Medications   Medication Instructions    ALPRAZolam (XANAX) 0.5 mg tablet As needed    amLODIPine-benazepril (LOTREL 5-20) 5-20 MG per capsule     ascorbic acid (VITAMIN C) 500 mg, Oral, Daily, Start 30 days prior to surgery    Carboxymethylcellul-Glycerin (Refresh Optive) 1-0.9 % GEL No dose, route, or frequency recorded.    cephalexin (KEFLEX) 500 mg capsule TAKE 4 CAPS 1 HOUR PRIOR TO DENTAL APPT    Cholecalciferol (Vitamin D3) 50 MCG (2000 UT) capsule     cyclobenzaprine (FLEXERIL) 5 mg tablet TAKE 1 TO 2 TABLETS BY MOUTH TWICE DAILY AS NEEDED    DHA-EPA-Flaxseed Oil-Vitamin E (THERA TEARS) CAPS Take by mouth    enoxaparin (LOVENOX) 40 mg, Subcutaneous, Daily, Start injections after surgery    escitalopram (LEXAPRO) 10 mg, Daily     "estradiol (ESTRACE) 1 g, Vaginal, 2 times weekly    ferrous sulfate 324 mg, Oral, Daily before breakfast, Start 30 days prior to surgery    folic acid (FOLVITE) 1 mg, Oral, Daily, Start 30 days prior to surgery    Glycerin, PF, (Optase Dry Eye Intense) 0.2 % SOLN     meloxicam (MOBIC) 15 mg tablet     metoprolol succinate (TOPROL-XL) 25 mg, Oral, Daily    mupirocin (BACTROBAN) 2 % ointment Topical, 2 times daily, Apply pea size amount to each nostril 2x/day for 5 days prior to procedure    nystatin-triamcinolone (MYCOLOG-II) cream Topical, 2 times daily           PRE-OP WORKSHEET DATA    Assessment of Pre-Operative Risks     MLJ Quality Hard Stops:    BMI (<40) : Estimated body mass index is 29.63 kg/m² as calculated from the following:    Height as of this encounter: 5' 2\" (1.575 m).    Weight as of this encounter: 73.5 kg (162 lb).    Hgb ( >11):   Lab Results   Component Value Date    HGB 13.5 04/07/2025    HGB 9.0 (L) 05/09/2015    HGB 9.8 (L) 05/08/2015       HbA1c (<7.5) :   Lab Results   Component Value Date    HGBA1C 5.8 (H) 04/07/2025       GFR (>60) (Less then 45 = Nephrology consult):    Lab Results   Component Value Date    EGFR 92 04/07/2025    EGFR 69 05/01/2021            Pre-Op Data Reviewed:       Laboratory Results: I have personally reviewed the pertinent reports    EKG: I personally reviewed and interpreted available tracings in the electronic medical record      Impression    Normal sinus rhythm, borderline left atrial enlargement, normal QRSs.   Specimen Collected: 12/16/24  8:35 AM       OLD RECORDS: reviewed old records in the chart review section if EHR on day of visit.    Previous cardiopulmonary studies within the past year:  Echocardiogram: yes   Lab Results   Component Value Date    LVEF 65 01/28/2025     Cardiac Catheterization: no  Stress Test: yes, 2025  negative      Time of visit including pre-visit chart review, visit and post-visit coordination of plan and care , review of " pre-surgical lab work, preparation and time spent documenting note in electronic medical record, time spent face-to-face in physical examination answering patient questions by care team 35 minutes             [facility]

## 2025-04-08 NOTE — PATIENT INSTRUCTIONS
BEFORE SURGERY    Contact your surgical nurse navigator or surgical provider with any questions regarding preoperative plan or schedule.  Stop all over the counter supplements, herbal, naturopathic  medications for 1 week prior to surgery UNLESS prescribed by your surgeon  Hold NSAIDS (i.e. advil, alleve, motrin, ibuprofen, celebrex) minimum 5 days prior to surgery  Follow presurgical medication instructions provided by preadmission nursing team reviewed during your presurgery phone call  Strategies for optimizing your surgery through breathing exercises, nutrition and physical activity can be found at [email or web address]/best  Call [phone] with any presurgical concerns or medications questions or use the messaging feature in your [employer] socrates to contact your provider    AFTER SURGERY    Recommend using Tylenol ( acetaminophen ) 1000 mg every eight hours during the first week post discharge along with icing the area for 20 mins every 3-4 hours while awake can be helpful in reducing your need for post operative opioid use. This opioid sparing plan can be used along side your surgeons pain plan.  Use stool softener over the counter (colace) daily after surgery during the first 1-2 weeks to avoid post operative constipation issues  If no bowel movement within 3 days after surgery then use over the counter Miralax in addition to your stool softener   If cleared by your surgical team for activity then early and often walking is encouraged and can be important in prevention of post surgical blood clots. Additionally spend as much time out of bed as possible and allowed by your surgical team  Use your incentive spirometer twice per hour in the first seven days after surgery to help prevent post surgery lung complications and infections  It is very important you follow the instructions from your surgeon regarding any medications for after surgery blood clot prevention. Compliance with these medications or interventions is very  important.  Call [phone] with any post discharge concerns or medical issues or use the messaging feature in your [employer] socrates to contact your provider

## 2025-04-14 ENCOUNTER — OFFICE VISIT (OUTPATIENT)
Age: 70
End: 2025-04-14
Payer: MEDICARE

## 2025-04-14 VITALS
SYSTOLIC BLOOD PRESSURE: 136 MMHG | DIASTOLIC BLOOD PRESSURE: 80 MMHG | WEIGHT: 162 LBS | HEIGHT: 62 IN | BODY MASS INDEX: 29.81 KG/M2 | HEART RATE: 77 BPM | OXYGEN SATURATION: 97 %

## 2025-04-14 DIAGNOSIS — G89.29 CHRONIC PAIN OF LEFT KNEE: ICD-10-CM

## 2025-04-14 DIAGNOSIS — M25.562 CHRONIC PAIN OF LEFT KNEE: ICD-10-CM

## 2025-04-14 DIAGNOSIS — Z01.818 PREOPERATIVE CLEARANCE: Primary | ICD-10-CM

## 2025-04-14 DIAGNOSIS — I10 PRIMARY HYPERTENSION: ICD-10-CM

## 2025-04-14 DIAGNOSIS — M17.12 PRIMARY OSTEOARTHRITIS OF LEFT KNEE: ICD-10-CM

## 2025-04-14 PROCEDURE — 99214 OFFICE O/P EST MOD 30 MIN: CPT | Performed by: NURSE PRACTITIONER

## 2025-04-14 PROCEDURE — G2211 COMPLEX E/M VISIT ADD ON: HCPCS | Performed by: NURSE PRACTITIONER

## 2025-04-17 RX ORDER — DIPHENOXYLATE HYDROCHLORIDE AND ATROPINE SULFATE 2.5; .025 MG/1; MG/1
1 TABLET ORAL DAILY
COMMUNITY

## 2025-04-17 NOTE — PRE-PROCEDURE INSTRUCTIONS
Pre-Surgery Instructions:   Medication Instructions    ALPRAZolam (XANAX) 0.5 mg tablet Uses PRN- OK to take day of surgery    amLODIPine-benazepril (LOTREL 5-20) 5-20 MG per capsule Hold day of surgery.    ascorbic acid (VITAMIN C) 500 MG tablet Stop taking 7 days prior to surgery.    Cholecalciferol (Vitamin D3) 50 MCG (2000 UT) capsule Stop taking 7 days prior to surgery.    cyclobenzaprine (FLEXERIL) 5 mg tablet Uses PRN- OK to take day of surgery    DHA-EPA-Flaxseed Oil-Vitamin E (THERA TEARS) CAPS Stop taking 7 days prior to surgery.    escitalopram (LEXAPRO) 10 mg tablet Take day of surgery.    estradiol (ESTRACE) 0.1 mg/g vaginal cream Stop one month    ferrous sulfate 324 (65 Fe) mg Hold day of surgery.    folic acid (FOLVITE) 1 mg tablet Hold day of surgery.    Glycerin, PF, (Optase Dry Eye Intense) 0.2 % SOLN Take day of surgery.    meloxicam (MOBIC) 15 mg tablet Stop taking 3 days prior to surgery.    metoprolol succinate (TOPROL-XL) 25 mg 24 hr tablet Take day of surgery.    multivitamin (THERAGRAN) TABS Hold day of surgery.    nystatin-triamcinolone (MYCOLOG-II) cream Hold day of surgery.   Medication instructions for day of surgery reviewed. Please take all instructed medications with only a sip of water.       You will receive a call one business day prior to surgery with an arrival time and hospital directions. If your surgery is scheduled on a Monday, the hospital will be calling you on the Friday prior to your surgery. If you have not heard from anyone by 8pm, please call the hospital supervisor through the hospital  at [phone]. ([place] [phone] or [place] [phone]).    Do not eat or drink anything after midnight the night before your surgery, including candy, mints, lifesavers, or chewing gum. Do not drink alcohol 24hrs before your surgery. Try not to smoke at least 24hrs before your surgery.       Follow the pre surgery showering instructions as listed in the “My Surgical  Experience Booklet” or otherwise provided by your surgeon's office. Do not use a blade to shave the surgical area 1 week before surgery. It is okay to use a clean electric clippers up to 24 hours before surgery. Do not apply any lotions, creams, including makeup, cologne, deodorant, or perfumes after showering on the day of your surgery. Do not use dry shampoo, hair spray, hair gel, or any type of hair products.     No contact lenses, eye make-up, or artificial eyelashes. Remove nail polish, including gel polish, and any artificial, gel, or acrylic nails if possible. Remove all jewelry including rings and body piercing jewelry.     Wear causal clothing that is easy to take on and off. Consider your type of surgery.    Keep any valuables, jewelry, piercings at home. Please bring any specially ordered equipment (sling, braces) if indicated.    Arrange for a responsible person to drive you to and from the hospital on the day of your surgery. Please confirm the visitor policy for the day of your procedure when you receive your phone call with an arrival time.     Call the surgeon's office with any new illnesses, exposures, or additional questions prior to surgery.    Please reference your “My Surgical Experience Booklet” for additional information to prepare for your upcoming surgery. Ortho class completed bring walker

## 2025-04-19 LAB
DME PARACHUTE DELIVERY DATE ACTUAL: NORMAL
DME PARACHUTE DELIVERY DATE REQUESTED: NORMAL
DME PARACHUTE ITEM DESCRIPTION: NORMAL
DME PARACHUTE ORDER STATUS: NORMAL
DME PARACHUTE SUPPLIER NAME: NORMAL
DME PARACHUTE SUPPLIER PHONE: NORMAL

## 2025-04-25 ENCOUNTER — ANESTHESIA EVENT (OUTPATIENT)
Age: 70
End: 2025-04-25
Payer: MEDICARE

## 2025-04-28 ENCOUNTER — EVALUATION (OUTPATIENT)
Dept: PHYSICAL THERAPY | Facility: REHABILITATION | Age: 70
End: 2025-04-28
Payer: MEDICARE

## 2025-04-28 DIAGNOSIS — Z96.652 AFTERCARE FOLLOWING LEFT KNEE JOINT REPLACEMENT SURGERY: Primary | ICD-10-CM

## 2025-04-28 DIAGNOSIS — Z47.1 AFTERCARE FOLLOWING LEFT KNEE JOINT REPLACEMENT SURGERY: Primary | ICD-10-CM

## 2025-04-28 DIAGNOSIS — M17.12 PRIMARY OSTEOARTHRITIS OF LEFT KNEE: ICD-10-CM

## 2025-04-28 DIAGNOSIS — Z01.818 PRE-OP EXAM: Primary | ICD-10-CM

## 2025-04-28 PROCEDURE — 97110 THERAPEUTIC EXERCISES: CPT

## 2025-04-28 PROCEDURE — 97161 PT EVAL LOW COMPLEX 20 MIN: CPT

## 2025-04-28 NOTE — PROGRESS NOTES
PT Pre-Op TKA Evaluation     Today's date: 2025  Patient name: Iva Reid  : 1955  MRN: [id number]  Referring provider: No ref. provider found  Dx:   Encounter Diagnosis     ICD-10-CM    1. Pre-op exam  Z01.818       2. Primary osteoarthritis of left knee  M17.12           Start Time: 1020  Stop Time: 1115  Total time in clinic (min): 55 minutes    Assessment  Impairments: abnormal coordination, abnormal gait, abnormal muscle firing, abnormal muscle tone, abnormal or restricted ROM, abnormal movement, activity intolerance, impaired balance, impaired physical strength, lacks appropriate home exercise program, pain with function, weight-bearing intolerance, poor posture , poor body mechanics, unable to perform ADL, participation limitations, activity limitations and endurance  Symptom irritability: high    Assessment details: Iva Reid is a 70 y.o. female who presents to outpatient physical therapy with a referral for pre-operative evaluation for L TKA that they will be undergoing on 25 performed by Dr. Mynor Sarabia.  Patient presents with knee pain when performing functional activities such as walking, kneeling, prolonged standing, transfers, and stair negotiation.  Upon further clinical testing, patient demonstrated the following deficits: active and passive knee motor dysfunction, knee flexion contracture, LE strength deficits, gait abnormalities, decrease balance, joint effusion, pain along joint line, and decrease functional tolerance. Observable gait abnormalities include: needing AD, slowed movements due to antalgic, decrease step-length, lack of TKE, improper weight shift, and L hip circumduction for foot clearence. Patient was provided with a Total Joint Pre-Op HEP which will be reviewed in the upcoming post-op session. Educated pt to stop any exercises causing pain increase, pt verbalizes understanding. Pt was educated on expectations and post-op protocols with demonstrated verbal  understanding.  Positive prognostic indicators include positive attitude toward recovery, good understanding of diagnosis and treatment plan options, and absence of observed red flags.  Negative prognostic indicators include chronicity of symptoms, hypertension, high symptom irritability, and multiple prior failed treatments.   Pt would benefit from skilled OP physical therapy to address these, and the below impairments in order to optimize outcomes and promote return to functional baseline.      Patient verbalized understanding of POC.    Please contact me if you have any questions or recommendations. Thank you for the referral and the opportunity to share in Iva Reid's care     Barriers to intervention: medical complexity  Understanding of Dx/Px/POC: good     Prognosis: good    Goals  Short Term Goals:  In 4-6 weeks, the patient will:  1. Pt will report at least a 25% reduction in subjective pain complaints/symptoms to better manage ADLs and household chores.   2. Pt will improve atleast a half a grade more on LE MMT  3. Pt independent with initial HEP, rationale, technique and frequency, for ROM and pain control.  4. Pt will be able to begin ambulating household distances with least restrictive assistive device   5. Pt will be able to tolerate performing functional tests and measures in future session       Long Term Goals:  In 12+ weeks, the patient will:  1. Pt will have atleast % improvement in post-op knee ROM and 5/5 on graded LE MMT  2. FOTO to greater than predicted value.  3. Independent with comprehensive HEP upon discharge.  4. Pt will be able to perform ADLs, iADLS, and household duties with minimal restriction indicating return to PLOF.  5. Pt independent with rationale, technique and plan for performance of advanced HEP to ensure independent self-management of symptoms upon discharge.  6. Pt will be able to ambulate within the community without any assistive device      Plan  Patient  would benefit from: PT eval and skilled physical therapy  Referral necessary: No  Planned modality interventions: cryotherapy, biofeedback and TENS    Planned therapy interventions: activity modification, ADL retraining, joint mobilization, manual therapy, massage, balance, balance/weight bearing training, behavior modification, body mechanics training, muscle pump exercises, motor coordination training, nerve gliding, neuromuscular re-education, patient education, postural training, community reintegration, self care, sensory integrative techniques, strengthening, stretching, therapeutic activities, therapeutic exercise, therapeutic training, home exercise program, graded motor, graded exercise, graded activity, functional ROM exercises, gait training, abdominal trunk stabilization, IASTM and patient/caregiver education    Frequency: 2x week  Plan of Care beginning date: 5/5/2025  Plan of Care expiration date: 8/5/2025  Treatment plan discussed with: patient and family        Subjective Evaluation    History of Present Illness  Mechanism of injury: IE 4/28: Patient presents for PT for TKA pre-op evaluation. She is scheduled to have her L TKA on 5/5/25 performed by Dr. Mynor Sarabia. Patient has a history of left knee issues with noting of consistent pain with weight bearing activities and frequent episodes of joint effusion, requiring frequent arthrocentesis. At this time she had failed conservative measures and is now a surgical candidate.    Denies any red flags which include: fever, chills, chest pain, focal neurologic deficits, urinary distention, urinary incontinence, fecal incontinence, saddle anesthesia.    Aggravating Factors: all weight bearing tasks  Relieving Factors: rest    Personal Functional Goals: gardening, walking, improved sleep quality            Recurrent probem    Quality of life: good    Patient Goals  Patient goals for therapy: increased strength, independence with ADLs/IADLs, return to  "sport/leisure activities, increased motion, decreased edema, decreased pain and improved balance    Pain  Current pain ratin  At best pain ratin  At worst pain rating: 10  Location: Left Knee  Quality: dull ache, discomfort, sharp, squeezing, tight, pressure, pulling, grinding, throbbing and radiating  Relieving factors: medications, change in position, relaxation, rest and support  Aggravating factors: sitting, stair climbing, standing and walking    Social Support  Steps to enter house: yes  5  Stairs in house: yes   14  Lives in: multiple-level home  Lives with: spouse    Employment status: not working  Exercise history: Walking      Diagnostic Tests  X-ray: abnormal  Treatments  Previous treatment: medication, injection treatment and physical therapy  Current treatment: physical therapy        Objective      Range of Motion: Goniometric revealed the following findings (in degrees).  Joint Motion Right: 2025 Left: 2025   Knee Extension WFL A: -25   P: -20 P!   Knee Flexion WFL A: 120 P!  P: 125   Ankle Dorsiflexion WFL WFL   Ankle Plantarflexion WFL 30     Strength: MMT revealed the following findings.  Joint Motion Right: 2025 Left: 2025   Hip Flexion 3/5 3/5   Hip Abduction 3/5 3/5   Hip Adduction 3/5 3/5   Hip Extension 3/5 3/5   Knee Extension 5/5 4/5   Knee Flexion 5/5 4/5   Ankle Plantarflexion 5/5 5/5   Ankle Dorsiflexion 5/5 5/5     Additional Assessments:  Palpation: P! Along medial and lateral joint line, posterior capsule  Gait Pattern: Antalgic with SPC  Balance:   Functional Capacity Tests Scores  25   5xSTS 17.80\"  No UE support  No Foam assist   TUG             With SPC                  19.53\"                 No SPC                  18.45\"       Risk Assessment and Prediction Tool results reviewed. Patient reported functional outcome scores reviewed. Discussed DOS and patient's questions were answered to patient's satisfaction. Mobility/ROM results per above. " "Strength results per above. Balance/Gait (including Timed Up & Go) per above. Virtual Home Assessment was reviewed with patient. Home Preparation Checklist was reviewed with patient including identification of care partner and encouragement of single level set-up. Post-operative pain management expectations discussed to the patient's satisfaction. Post-operative gait training for level ground, stairs, and car transfers was performed. Patient demonstrated competence with immediate post-operative home exercise program.          Precautions: L TKA 5/5/25, HTN, Kidney     POC expires Unit limit Auth Expiration date PT/OT + Visit Limit?   8/5/25 bomn 12/31/25 bomn         Visit/Unit Tracking  AUTH Status:  Date 4/28        10 visits every RE Used 1         Remaining  9           Pertinent Findings:                                                                                             Test / Measure  4/28/25  Pre-Op Test   FOTO (Predicted )    L Knee AROM Ext:   A: -25   P: -20 P!    Flex:  A: 120 P!  P: 125   L Knee MMT 4/5   5xSTS  17.80\"  No UE support  No Foam assist        Access Code: [email or web address]  URL: [email or web address]  Date: 04/28/2025  Prepared by: Yolanda Cheema    Exercises  - Supine Gluteal Sets  - 1-4 x daily - 7 x weekly - 1 sets - 10 reps - 10 second hold  - Supine Heel Slide  - 1-4 x daily - 7 x weekly - 1 sets - 10 reps - 5-10 second hold  - Supine Quad Set  - 1-4 x daily - 7 x weekly - 1-2 sets - 10 reps - 5-10 second hold  - Supine Hip Abduction  - 1-4 x daily - 7 x weekly - 3 sets - 10 reps  - Supine Active Straight Leg Raise  - 1-3 x daily - 7 x weekly - 3 sets - 10 reps - 2 second hold  - Supine Bridge  - 1 x daily - 7 x weekly - 3 sets - 5-8 reps  - Seated Knee Extension AAROM  - 1-4 x daily - 7 x weekly - 3 sets - 10 reps  - Seated Knee Flexion Extension AAROM with Overpressure  - 1-4 x daily - 7 x weekly - 3 sets - 10 reps  - Seated Knee Extension Stretch with Chair  - 1-3 x daily " - 7 x weekly - 1 sets - 1 reps - 5 minutes hold  - Seated Calf Stretch with Strap  - 1-3 x daily - 7 x weekly - 1 sets - 10 reps - 10 second hold  - Seated Ankle Pumps  - 1-4 x daily - 7 x weekly - 3 sets - 10 reps    Patient Education  - Pain Management  - TKR: How Early Mobility Aids Recovery   - TKR: Surgery Overview  - TKR: Your Nerves and Your Knee--Pain Neuroscience Education for Knee Replacement    Manuals 4/28           L Knee PROM            L Patella Mobilization            L Tibiofemoral Posterior Mob                         Neuro Re-Ed            Quadset            SAQ on bolster        SLR            Single Leg Balance            Eccentric Step Downs                          Ther Ex             HEP/Patient Education AR performed           NuStep or RB             Heel Slides             Bridge        Standing Hip Abduction              Heel Raises/Toe Raises                                                                     Ther Activity              Fwd Step-Ups              Lat Step-Ups             Gait Training                                         Modalities

## 2025-05-02 NOTE — DISCHARGE INSTR - AVS FIRST PAGE
Discharge Instructions: Knee replacement with Dr. Sarabia    Weight Bearing Status:                                           Weight Bearing as tolerated to the left lower extremity with assistive devices.     Pain Management/Medications  You may resume your usual medications.  You may stop pre-operative vitamins (Folic acid, Ferrous sulfate, and Vitamin C) and Bactroban ointment.   Please take the following medications:  Anti-coagulation (blood clot prevention) - Complete Lovenox injections for 28 days.   Pain medication:  Oxycodone 5 m tablet every 6 hours as needed for severe pain  Robaxin (Muscle relaxer) 500 m tablet up to 3 times a day as needed for mild pain and muscle discomfort  Tylenol 1000 mg: up to three times daily as needed for mild to moderate pain. Do not exceed 3000mg daily   The pain medications will likely cause constipation, in order to decrease this risk consider taking over the counter stool softeners or MiraLax    Continue applying ice to your knee on and off for about 20 minutes as needed.  Continue to elevate your operative leg, with ankle above the level of your heart when possible.    If you have questions or pain concerns, please contact the office.   If you need refills of your medications prior to your next office visit, please contact the office.     Showering/Dressing Instructions:   Do not shower until first follow up appointment.  Keep surgical dressing clean and dry until follow up appointment.  You may adjust the ACE bandage as it slides down   If your leg is swelling around the bandage due to continued sliding, please remove the bandage   No baths, swimming, or submerging your leg until cleared to do so.      Driving Instructions:  No driving until cleared by Orthopaedic Surgery.    PT/OT:  Continue PT/OT on outpatient basis as directed    Follow up instructions:   Follow up as scheduled on 2025 in [place].  If you need to change or cancel your appointment for  any reason, please call the clinic at [phone]    Please contact the office if you experience any of the following:  Excessive bleeding (bleeding through your dressing)  Fever greater than 101 degrees F after 48 hours (low grade fevers the day or two after surgery are normal)  Persistent nausea or vomiting  Decreased sensation or discoloration of the operative limb  Pain or swelling that is getting worse and not better with medication    Miscellaneous:  Advise against any dental cleanings or procedures for 3 months after surgery.   - If there is a dental emergency, please contact the office for further instructions.

## 2025-05-05 ENCOUNTER — ANESTHESIA (OUTPATIENT)
Age: 70
End: 2025-05-05
Payer: MEDICARE

## 2025-05-05 ENCOUNTER — HOSPITAL ENCOUNTER (OUTPATIENT)
Age: 70
Setting detail: OUTPATIENT SURGERY
Discharge: HOME | End: 2025-05-06
Attending: ORTHOPAEDIC SURGERY | Admitting: ORTHOPAEDIC SURGERY
Payer: MEDICARE

## 2025-05-05 DIAGNOSIS — M17.12 PRIMARY OSTEOARTHRITIS OF LEFT KNEE: Primary | ICD-10-CM

## 2025-05-05 DIAGNOSIS — M25.562 CHRONIC PAIN OF LEFT KNEE: ICD-10-CM

## 2025-05-05 DIAGNOSIS — G89.29 CHRONIC PAIN OF LEFT KNEE: ICD-10-CM

## 2025-05-05 PROCEDURE — NC001 PR NO CHARGE: Performed by: ORTHOPAEDIC SURGERY

## 2025-05-05 PROCEDURE — C1776 JOINT DEVICE (IMPLANTABLE): HCPCS | Performed by: ORTHOPAEDIC SURGERY

## 2025-05-05 PROCEDURE — 27447 TOTAL KNEE ARTHROPLASTY: CPT

## 2025-05-05 PROCEDURE — 99222 1ST HOSP IP/OBS MODERATE 55: CPT | Performed by: INTERNAL MEDICINE

## 2025-05-05 PROCEDURE — C1713 ANCHOR/SCREW BN/BN,TIS/BN: HCPCS | Performed by: ORTHOPAEDIC SURGERY

## 2025-05-05 PROCEDURE — 97163 PT EVAL HIGH COMPLEX 45 MIN: CPT | Performed by: PHYSICAL THERAPIST

## 2025-05-05 PROCEDURE — S2900 ROBOTIC SURGICAL SYSTEM: HCPCS | Performed by: ORTHOPAEDIC SURGERY

## 2025-05-05 PROCEDURE — 97167 OT EVAL HIGH COMPLEX 60 MIN: CPT

## 2025-05-05 PROCEDURE — 27447 TOTAL KNEE ARTHROPLASTY: CPT | Performed by: ORTHOPAEDIC SURGERY

## 2025-05-05 PROCEDURE — NC001 PR NO CHARGE: Performed by: INTERNAL MEDICINE

## 2025-05-05 PROCEDURE — S2900 ROBOTIC SURGICAL SYSTEM: HCPCS

## 2025-05-05 DEVICE — ATTUNE KNEE SYSTEM TIBIAL INSERT FIXED BEARING POSTERIOR STABILIZED 5 5MM AOX
Type: IMPLANTABLE DEVICE | Site: KNEE | Status: FUNCTIONAL
Brand: ATTUNE

## 2025-05-05 DEVICE — ATTUNE KNEE SYSTEM FEMORAL POSTERIOR STABILIZED NARROW SIZE 5N LEFT CEMENTED
Type: IMPLANTABLE DEVICE | Site: KNEE | Status: FUNCTIONAL
Brand: ATTUNE

## 2025-05-05 DEVICE — ATTUNE PATELLA MEDIALIZED DOME 35MM CEMENTED AOX
Type: IMPLANTABLE DEVICE | Site: KNEE | Status: FUNCTIONAL
Brand: ATTUNE

## 2025-05-05 DEVICE — ATTUNE KNEE SYSTEM TIBIAL BASE FIXED BEARING SIZE 4 CEMENTED
Type: IMPLANTABLE DEVICE | Site: KNEE | Status: FUNCTIONAL
Brand: ATTUNE

## 2025-05-05 DEVICE — SMARTSET HV HIGH VISCOSITY BONE CEMENT 40G
Type: IMPLANTABLE DEVICE | Site: KNEE | Status: FUNCTIONAL
Brand: SMARTSET

## 2025-05-05 RX ORDER — ENOXAPARIN SODIUM 100 MG/ML
40 INJECTION SUBCUTANEOUS DAILY
Status: DISCONTINUED | OUTPATIENT
Start: 2025-05-05 | End: 2025-05-06 | Stop reason: HOSPADM

## 2025-05-05 RX ORDER — ALPRAZOLAM 0.25 MG
0.5 TABLET ORAL 4 TIMES DAILY PRN
Status: DISCONTINUED | OUTPATIENT
Start: 2025-05-05 | End: 2025-05-06 | Stop reason: HOSPADM

## 2025-05-05 RX ORDER — BUPIVACAINE HYDROCHLORIDE 2.5 MG/ML
INJECTION, SOLUTION EPIDURAL; INFILTRATION; INTRACAUDAL; PERINEURAL
Status: COMPLETED | OUTPATIENT
Start: 2025-05-05 | End: 2025-05-05

## 2025-05-05 RX ORDER — METHOCARBAMOL 500 MG/1
500 TABLET, FILM COATED ORAL 3 TIMES DAILY PRN
Qty: 60 TABLET | Refills: 0 | Status: SHIPPED | OUTPATIENT
Start: 2025-05-05

## 2025-05-05 RX ORDER — LIDOCAINE HYDROCHLORIDE 10 MG/ML
INJECTION, SOLUTION EPIDURAL; INFILTRATION; INTRACAUDAL; PERINEURAL AS NEEDED
Status: DISCONTINUED | OUTPATIENT
Start: 2025-05-05 | End: 2025-05-05

## 2025-05-05 RX ORDER — TRANEXAMIC ACID 10 MG/ML
1000 INJECTION, SOLUTION INTRAVENOUS ONCE
Status: COMPLETED | OUTPATIENT
Start: 2025-05-05 | End: 2025-05-05

## 2025-05-05 RX ORDER — CALCIUM CARBONATE 500 MG/1
1000 TABLET, CHEWABLE ORAL DAILY PRN
Status: DISCONTINUED | OUTPATIENT
Start: 2025-05-05 | End: 2025-05-06 | Stop reason: HOSPADM

## 2025-05-05 RX ORDER — FENTANYL CITRATE 50 UG/ML
INJECTION, SOLUTION INTRAMUSCULAR; INTRAVENOUS
Status: COMPLETED | OUTPATIENT
Start: 2025-05-05 | End: 2025-05-05

## 2025-05-05 RX ORDER — FENTANYL CITRATE/PF 50 MCG/ML
25 SYRINGE (ML) INJECTION
Status: DISCONTINUED | OUTPATIENT
Start: 2025-05-05 | End: 2025-05-05 | Stop reason: HOSPADM

## 2025-05-05 RX ORDER — OXYCODONE HYDROCHLORIDE 5 MG/1
5 TABLET ORAL EVERY 4 HOURS PRN
Status: DISCONTINUED | OUTPATIENT
Start: 2025-05-05 | End: 2025-05-06 | Stop reason: HOSPADM

## 2025-05-05 RX ORDER — PROPOFOL 10 MG/ML
INJECTION, EMULSION INTRAVENOUS AS NEEDED
Status: DISCONTINUED | OUTPATIENT
Start: 2025-05-05 | End: 2025-05-05

## 2025-05-05 RX ORDER — SODIUM CHLORIDE, SODIUM LACTATE, POTASSIUM CHLORIDE, CALCIUM CHLORIDE 600; 310; 30; 20 MG/100ML; MG/100ML; MG/100ML; MG/100ML
125 INJECTION, SOLUTION INTRAVENOUS CONTINUOUS
Status: DISCONTINUED | OUTPATIENT
Start: 2025-05-05 | End: 2025-05-06 | Stop reason: HOSPADM

## 2025-05-05 RX ORDER — ACETAMINOPHEN 500 MG
1000 TABLET ORAL EVERY 6 HOURS PRN
Qty: 120 TABLET | Refills: 0 | Status: SHIPPED | OUTPATIENT
Start: 2025-05-05

## 2025-05-05 RX ORDER — ESCITALOPRAM OXALATE 10 MG/1
10 TABLET ORAL DAILY
Status: DISCONTINUED | OUTPATIENT
Start: 2025-05-06 | End: 2025-05-06 | Stop reason: HOSPADM

## 2025-05-05 RX ORDER — OXYCODONE HYDROCHLORIDE 10 MG/1
10 TABLET ORAL EVERY 4 HOURS PRN
Status: DISCONTINUED | OUTPATIENT
Start: 2025-05-05 | End: 2025-05-06 | Stop reason: HOSPADM

## 2025-05-05 RX ORDER — CHLORHEXIDINE GLUCONATE ORAL RINSE 1.2 MG/ML
15 SOLUTION DENTAL ONCE
Status: COMPLETED | OUTPATIENT
Start: 2025-05-05 | End: 2025-05-05

## 2025-05-05 RX ORDER — ALBUTEROL SULFATE 0.83 MG/ML
2.5 SOLUTION RESPIRATORY (INHALATION) ONCE AS NEEDED
Status: DISCONTINUED | OUTPATIENT
Start: 2025-05-05 | End: 2025-05-05 | Stop reason: HOSPADM

## 2025-05-05 RX ORDER — AMLODIPINE BESYLATE 5 MG/1
5 TABLET ORAL DAILY
Status: DISCONTINUED | OUTPATIENT
Start: 2025-05-05 | End: 2025-05-06 | Stop reason: HOSPADM

## 2025-05-05 RX ORDER — CEFAZOLIN SODIUM 1 G/50ML
1000 SOLUTION INTRAVENOUS EVERY 8 HOURS
Status: COMPLETED | OUTPATIENT
Start: 2025-05-05 | End: 2025-05-06

## 2025-05-05 RX ORDER — PROPOFOL 10 MG/ML
INJECTION, EMULSION INTRAVENOUS CONTINUOUS PRN
Status: DISCONTINUED | OUTPATIENT
Start: 2025-05-05 | End: 2025-05-05

## 2025-05-05 RX ORDER — METHOCARBAMOL 500 MG/1
500 TABLET, FILM COATED ORAL EVERY 6 HOURS SCHEDULED
Status: DISCONTINUED | OUTPATIENT
Start: 2025-05-05 | End: 2025-05-06 | Stop reason: HOSPADM

## 2025-05-05 RX ORDER — OXYCODONE HYDROCHLORIDE 5 MG/1
5 TABLET ORAL EVERY 6 HOURS PRN
Qty: 30 TABLET | Refills: 0 | Status: SHIPPED | OUTPATIENT
Start: 2025-05-05 | End: 2025-05-06

## 2025-05-05 RX ORDER — CEFAZOLIN SODIUM 2 G/50ML
2000 SOLUTION INTRAVENOUS ONCE
Status: COMPLETED | OUTPATIENT
Start: 2025-05-05 | End: 2025-05-05

## 2025-05-05 RX ORDER — MAGNESIUM HYDROXIDE 1200 MG/15ML
LIQUID ORAL AS NEEDED
Status: DISCONTINUED | OUTPATIENT
Start: 2025-05-05 | End: 2025-05-05 | Stop reason: HOSPADM

## 2025-05-05 RX ORDER — METOPROLOL SUCCINATE 25 MG/1
25 TABLET, EXTENDED RELEASE ORAL DAILY
Status: DISCONTINUED | OUTPATIENT
Start: 2025-05-06 | End: 2025-05-06 | Stop reason: HOSPADM

## 2025-05-05 RX ORDER — ONDANSETRON 2 MG/ML
4 INJECTION INTRAMUSCULAR; INTRAVENOUS ONCE AS NEEDED
Status: DISCONTINUED | OUTPATIENT
Start: 2025-05-05 | End: 2025-05-05 | Stop reason: HOSPADM

## 2025-05-05 RX ORDER — DOCUSATE SODIUM 100 MG/1
100 CAPSULE, LIQUID FILLED ORAL 2 TIMES DAILY
Status: DISCONTINUED | OUTPATIENT
Start: 2025-05-05 | End: 2025-05-06 | Stop reason: HOSPADM

## 2025-05-05 RX ORDER — HYDROMORPHONE HCL/PF 1 MG/ML
0.5 SYRINGE (ML) INJECTION EVERY 2 HOUR PRN
Status: DISCONTINUED | OUTPATIENT
Start: 2025-05-05 | End: 2025-05-06 | Stop reason: HOSPADM

## 2025-05-05 RX ORDER — ONDANSETRON 2 MG/ML
4 INJECTION INTRAMUSCULAR; INTRAVENOUS EVERY 6 HOURS PRN
Status: DISCONTINUED | OUTPATIENT
Start: 2025-05-05 | End: 2025-05-06 | Stop reason: HOSPADM

## 2025-05-05 RX ORDER — ONDANSETRON 2 MG/ML
INJECTION INTRAMUSCULAR; INTRAVENOUS AS NEEDED
Status: DISCONTINUED | OUTPATIENT
Start: 2025-05-05 | End: 2025-05-05

## 2025-05-05 RX ORDER — METOCLOPRAMIDE HYDROCHLORIDE 5 MG/ML
10 INJECTION INTRAMUSCULAR; INTRAVENOUS ONCE AS NEEDED
Status: DISCONTINUED | OUTPATIENT
Start: 2025-05-05 | End: 2025-05-05 | Stop reason: HOSPADM

## 2025-05-05 RX ORDER — HYDROMORPHONE HCL IN WATER/PF 6 MG/30 ML
0.2 PATIENT CONTROLLED ANALGESIA SYRINGE INTRAVENOUS
Status: DISCONTINUED | OUTPATIENT
Start: 2025-05-05 | End: 2025-05-05 | Stop reason: HOSPADM

## 2025-05-05 RX ORDER — GABAPENTIN 100 MG/1
100 CAPSULE ORAL EVERY 8 HOURS
Status: DISCONTINUED | OUTPATIENT
Start: 2025-05-05 | End: 2025-05-06 | Stop reason: HOSPADM

## 2025-05-05 RX ORDER — DEXAMETHASONE SODIUM PHOSPHATE 10 MG/ML
INJECTION, SOLUTION INTRAMUSCULAR; INTRAVENOUS AS NEEDED
Status: DISCONTINUED | OUTPATIENT
Start: 2025-05-05 | End: 2025-05-05

## 2025-05-05 RX ORDER — ACETAMINOPHEN 325 MG/1
975 TABLET ORAL EVERY 6 HOURS PRN
Status: DISCONTINUED | OUTPATIENT
Start: 2025-05-05 | End: 2025-05-06 | Stop reason: HOSPADM

## 2025-05-05 RX ADMIN — PROPOFOL 80 MG: 10 INJECTION, EMULSION INTRAVENOUS at 11:35

## 2025-05-05 RX ADMIN — ENOXAPARIN SODIUM 40 MG: 40 INJECTION SUBCUTANEOUS at 23:11

## 2025-05-05 RX ADMIN — MEPIVACAINE HYDROCHLORIDE 3.5 ML: 15 INJECTION, SOLUTION EPIDURAL; INFILTRATION at 11:33

## 2025-05-05 RX ADMIN — GABAPENTIN 100 MG: 100 CAPSULE ORAL at 14:36

## 2025-05-05 RX ADMIN — SODIUM CHLORIDE, SODIUM LACTATE, POTASSIUM CHLORIDE, AND CALCIUM CHLORIDE 125 ML/HR: .6; .31; .03; .02 INJECTION, SOLUTION INTRAVENOUS at 14:31

## 2025-05-05 RX ADMIN — METHOCARBAMOL 500 MG: 500 TABLET ORAL at 23:11

## 2025-05-05 RX ADMIN — CHLORHEXIDINE GLUCONATE 15 ML: 1.2 SOLUTION ORAL at 10:07

## 2025-05-05 RX ADMIN — DEXAMETHASONE SODIUM PHOSPHATE 10 MG: 10 INJECTION INTRAMUSCULAR; INTRAVENOUS at 11:40

## 2025-05-05 RX ADMIN — BUPIVACAINE HYDROCHLORIDE 20 ML: 2.5 INJECTION, SOLUTION EPIDURAL; INFILTRATION; INTRACAUDAL; PERINEURAL at 10:40

## 2025-05-05 RX ADMIN — SODIUM CHLORIDE, SODIUM LACTATE, POTASSIUM CHLORIDE, AND CALCIUM CHLORIDE 125 ML/HR: .6; .31; .03; .02 INJECTION, SOLUTION INTRAVENOUS at 10:07

## 2025-05-05 RX ADMIN — TRANEXAMIC ACID 1000 MG: 10 INJECTION, SOLUTION INTRAVENOUS at 11:40

## 2025-05-05 RX ADMIN — METHOCARBAMOL 500 MG: 500 TABLET ORAL at 14:33

## 2025-05-05 RX ADMIN — PROPOFOL 80 MCG/KG/MIN: 10 INJECTION, EMULSION INTRAVENOUS at 11:37

## 2025-05-05 RX ADMIN — OXYCODONE HYDROCHLORIDE 5 MG: 5 TABLET ORAL at 14:54

## 2025-05-05 RX ADMIN — GABAPENTIN 100 MG: 100 CAPSULE ORAL at 23:11

## 2025-05-05 RX ADMIN — ACETAMINOPHEN 975 MG: 325 TABLET ORAL at 14:54

## 2025-05-05 RX ADMIN — BUPIVACAINE 10 ML: 13.3 INJECTION, SUSPENSION, LIPOSOMAL INFILTRATION at 10:40

## 2025-05-05 RX ADMIN — OXYCODONE HYDROCHLORIDE 5 MG: 5 TABLET ORAL at 19:10

## 2025-05-05 RX ADMIN — ONDANSETRON 4 MG: 2 INJECTION INTRAMUSCULAR; INTRAVENOUS at 11:40

## 2025-05-05 RX ADMIN — FENTANYL CITRATE 100 MCG: 50 INJECTION INTRAMUSCULAR; INTRAVENOUS at 10:35

## 2025-05-05 RX ADMIN — CEFAZOLIN SODIUM 1000 MG: 1 SOLUTION INTRAVENOUS at 19:11

## 2025-05-05 RX ADMIN — LIDOCAINE HYDROCHLORIDE 50 MG: 10 INJECTION, SOLUTION EPIDURAL; INFILTRATION; INTRACAUDAL; PERINEURAL at 11:35

## 2025-05-05 RX ADMIN — SODIUM CHLORIDE, SODIUM LACTATE, POTASSIUM CHLORIDE, AND CALCIUM CHLORIDE: .6; .31; .03; .02 INJECTION, SOLUTION INTRAVENOUS at 12:37

## 2025-05-05 RX ADMIN — PHENYLEPHRINE HYDROCHLORIDE 30 MCG/MIN: 10 INJECTION INTRAVENOUS at 11:37

## 2025-05-05 RX ADMIN — BUPIVACAINE HYDROCHLORIDE 10 ML: 2.5 INJECTION, SOLUTION EPIDURAL; INFILTRATION; INTRACAUDAL; PERINEURAL at 10:45

## 2025-05-05 RX ADMIN — ACETAMINOPHEN 975 MG: 325 TABLET ORAL at 23:10

## 2025-05-05 RX ADMIN — CEFAZOLIN SODIUM 2000 MG: 2 SOLUTION INTRAVENOUS at 11:30

## 2025-05-05 NOTE — ANESTHESIA POSTPROCEDURE EVALUATION
Post-Op Assessment Note    CV Status:  Stable    Pain management: adequate       Mental Status:  Alert and awake   Hydration Status:  Euvolemic   PONV Controlled:  Controlled   Airway Patency:  Patent     Post Op Vitals Reviewed: Yes    No anethesia notable event occurred.    Staff: CRNA           Last Filed PACU Vitals:  Vitals Value Taken Time   Temp 97.7    Pulse 58 05/05/25 1256   /57 05/05/25 1256   Resp 12 05/05/25 1256   SpO2 96 % 05/05/25 1256   Vitals shown include unfiled device data.

## 2025-05-05 NOTE — OCCUPATIONAL THERAPY NOTE
Occupational Therapy Evaluation     Patient Name: Iva Reid  Today's Date: 5/5/2025  Problem List  Principal Problem:    Primary osteoarthritis of left knee  Active Problems:    Hypertension    PAC (premature atrial contraction)    Dyslipidemia    Past Medical History  Past Medical History:   Diagnosis Date    Anxiety     Hematuria     Hypertension     Irregular heart beat     Kidney stone May 2021     Past Surgical History  Past Surgical History:   Procedure Laterality Date    BILATERAL SALPINGOOPHORECTOMY      last assessed 8/11/14    BREAST BIOPSY Left     EXCISIONAL BIOPSY BENIGN    CHOLECYSTECTOMY  2009    COLONOSCOPY  10/31/2022    HYSTERECTOMY  2004    JOINT REPLACEMENT Right     knee    KNEE SURGERY      OOPHORECTOMY Bilateral 2004    ((Pt Qnr Sub: decrease kidney function))         05/05/25 1626   OT Last Visit   OT Visit Date 05/05/25   Note Type   Note type Evaluation   Additional Comments pt greeted in supine, agreeable to OT evaluation   Pain Assessment   Pain Assessment Tool 0-10   Pain Score 4   Pain Location/Orientation Orientation: Left;Location: Knee   Hospital Pain Intervention(s) Repositioned;Ambulation/increased activity;Emotional support;Elevated;Rest   Restrictions/Precautions   Weight Bearing Precautions Per Order Yes   LLE Weight Bearing Per Order WBAT   Other Precautions WBS;Chair Alarm;Bed Alarm;Multiple lines;Fall Risk;Pain  (hemovac)   Home Living   Type of Home House   Home Layout Multi-level;Performs ADLs on one level;Stairs to enter with rails;Bed/bath upstairs  (4+1 NEHA bilateral rails, no bathroom on main level but has BSC)   Bathroom Shower/Tub Tub/shower unit   Bathroom Toilet Raised   Bathroom Equipment Commode;Grab bars in shower;Shower chair   Bathroom Accessibility Accessible   Home Equipment Walker;Cane;Grab bars   Additional Comments FF to 2nd floor, bilateral rails   Prior Function   Level of [place] Independent with ADLs;Independent with functional  mobility;Independent with IADLS   Lives With Spouse   Receives Help From Family   IADLs Independent with driving;Independent with meal prep;Independent with medication management   Falls in the last 6 months 1 to 4  (1x)   Vocational Retired   Comments (+) , use of cane vs RW at baseline   Lifestyle   Autonomy Independent with all ADLs/IADLs, use of cane vs RW at baseline   Reciprocal Relationships Spouse   Service to Others Retired   Intrinsic Gratification walking, puzzles   General   Family/Caregiver Present No   ADL   Where Assessed Edge of bed   Eating Assistance 5  Supervision/Setup   Grooming Assistance 5  Supervision/Setup   UB Bathing Assistance 5  Supervision/Setup   LB Bathing Assistance 4  Minimal Assistance   UB Dressing Assistance 5  Supervision/Setup   LB Dressing Assistance 3  Moderate Assistance   Toileting Assistance  4  Minimal Assistance   Bed Mobility   Supine to Sit 4  Minimal assistance   Additional items Assist x 1;HOB elevated;Increased time required;Verbal cues;LE management   Sit to Supine Unable to assess   Additional Comments BP supine: 139/65   Transfers   Sit to Stand 4  Minimal assistance   Additional items Assist x 1;Increased time required;Verbal cues  (RW)   Stand to Sit 4  Minimal assistance   Additional items Assist x 1;Increased time required;Verbal cues;Armrests  (RW)   Toilet transfer 4  Minimal assistance   Additional items Assist x 1;Increased time required;Standard toilet;Verbal cues;Commode  (BSC over standard toilet, use of RW)   Additional Comments verbal cues for hand placement and saftey with RW. MAX verbal cues for safety during transfers BP in chair: 118/57.   Functional Mobility   Functional Mobility 4  Minimal assistance   Additional Comments Pt completed functional mobility functional in room distance with use of RW and minAx1   Additional items Rolling walker   Balance   Static Sitting Good   Dynamic Sitting Fair +   Static Standing Fair   Dynamic Standing  Fair -   Ambulatory Fair -   Activity Tolerance   Activity Tolerance Patient tolerated treatment well;Patient limited by pain   Medical Staff Made Aware PT [place], Pt seen for co-evaluation/treatment with skilled Physical Therapy due to pt's medical complexity, decreased endurance, overall functional level, overall safety, and post surgical day #0.   Nurse Made Aware ROLAND Ace   RUE Assessment   RUE Assessment WFL   LUE Assessment   LUE Assessment WFL   Hand Function   Gross Motor Coordination Functional   Fine Motor Coordination Functional   Cognition   Overall Cognitive Status WFL   Arousal/Participation Alert;Cooperative   Attention Within functional limits   Orientation Level Oriented X4   Memory Within functional limits   Following Commands Follows all commands and directions without difficulty   Comments Cooperative and pleasant   Assessment   Limitation Decreased ADL status;Decreased endurance;Decreased self-care trans;Decreased high-level ADLs   Prognosis Good   Assessment Pt is a 70 y.o. female seen for OT evaluation s/p adm [facility] w/ chronic pain of left knee and primary osteoarthritis left knee. Comorbidities affecting pt’s functional performance include a significant PMH of anxiety, HTN, hematuria, irregular heart beat, PAC, dyslipidemia. Pt with active OT orders and activity orders for Activity beginning POD #0. Pt lives w/ spouse in a multilevel house with 4 NEHA. Pt has no bathroom on main level, tub/shower upstairs. Comfort height toilet. At baseline, pt was independent with all ADLs/IADLs. Pt completed supine to sit with Yari. Sit to stand with Yari. Pt completed functional mobility from EOB to bathroom with slow pacing and verbal cues for safety with RW and Yari. Pt completed toileting and toilet transfer with Yari. Pt completed stand to sit into chair with Yari and use of RW. Don of underwear w/pad with modA seated, then standing to pull over waist with modA and use of RW.  Verbal cues to maintain hands on walker during session required. Upon evaluation, pt currently requires S for UB ADLs, Yari for LB ADLs, Yari for toileting, Yari for bed mobility, Yari for functional mobility, and Yari for transfers 2* the following deficits impacting occupational performance: weakness, decreased strength , decreased balance, decreased activity tolerance, increased pain, and orthopedic restrictions. These impairments, as well at pt’s personal factors of: NEHA home environment, steps within home environment, difficulty performing ADLs, difficulty performing IADLs, difficulty performing transfers/mobility, WBS, fall risk , and new use of AD for functional transfers/mobility limit pt’s ability to safely engage in all baseline areas of occupation. Based on the aforementioned OT evaluation, functional performance deficits, and assessments, pt has been identified as a high complexity evaluation. Pt to continue to benefit from continued acute OT services during hospital stay to address defined deficits and to maximize level of functional independence in the following Occupational Performance areas: grooming, bathing/shower, toilet hygiene, dressing, health maintenance, functional mobility, community mobility, clothing management, cleaning, household maintenance, and job performance/volunteering. From OT standpoint, recommend III; Minimum Resource Intensity (pending progress) upon D/C. OT will continue to follow pt 3-5x/wk.   Goals   Patient Goals to get better   STG Time Frame 1-3   Short Term Goal #1 Pt will improve activity tolerance to G for min 30 min treatment sessions for increase engagement in functional tasks   Short Term Goal #2 Pt will complete bed mobility at a mod I level w/ G balance/safety demonstrated to decrease caregiver assistance required   Short Term Goal  Pt will complete LB dressing/self care w/ mod I using adaptive device and DME as needed   LTG Time Frame 3-7   Long Term Goal #1 Pt  will complete toileting w/ mod I w/ G hygiene/thoroughness using DME as needed   Long Term Goal #2 Pt will improve functional transfers to mod I on/off all surfaces using DME as needed w/ G balance/safety   Long Term Goal Pt will improve functional mobility during ADL/IADL/leisure tasks to mod I using DME as needed w/ G balance/safety   Plan   Treatment Interventions ADL retraining;Functional transfer training;Endurance training;Patient/family training;Equipment evaluation/education;Compensatory technique education;Continued evaluation;Energy conservation;Activityengagement   Goal Expiration Date 05/12/25   OT Treatment Day 0   OT Frequency 3-5x/wk   Discharge Recommendation   Rehab Resource Intensity Level, OT III (Minimum Resource Intensity)  (pending progress)   Additional Comments  The patient's raw score on the AM-PAC Daily Activity Inpatient Short Form is 19 . A raw score of greater than or equal to 19 suggests the patient may benefit from discharge to home. Please refer to the recommendation of the Occupational Therapist for safe discharge planning.   AM-PAC Daily Activity Inpatient   Lower Body Dressing 2   Bathing 3   Toileting 3   Upper Body Dressing 4   Grooming 3   Eating 4   Daily Activity Raw Score 19   Daily Activity Standardized Score (Calc for Raw Score >=11) 40.22   AM-PAC Applied Cognition Inpatient   Following a Speech/Presentation 4   Understanding Ordinary Conversation 4   Taking Medications 3   Remembering Where Things Are Placed or Put Away 3   Remembering List of 4-5 Errands 3   Taking Care of Complicated Tasks 3   Applied Cognition Raw Score 20   Applied Cognition Standardized Score 41.76   End of Consult   Education Provided Yes;Family or social support of family present for education by provider   Patient Position at End of Consult Bedside chair;Bed/Chair alarm activated;All needs within reach   Nurse Communication Nurse aware of consult   End of Consult Comments Pt seated OOB in chair  with chair alarm activated at end of session. Call bell and phone within reach. All needs met and pt reports no further questions for OT at this time.   Winter Nelson, OT

## 2025-05-05 NOTE — H&P
H&P Exam - Orthopedics   Iva Reid 70 y.o. female MRN: [id number]      Assessment/Plan   Assessment:  left Knee Osteoarthritis in this adult female who continues to have weightbearing pain and dysfunction despite appropriate nonsurgical treatments    Plan:  left  Total Knee Arthroplasty patient is familiar with the risks, benefits, alternatives    TOTAL KNEE REPLACEMENT INDICATIONS AND RISKS    We had a lengthy discussion with the patient regarding the potential options for treatment. The patient has had an extensive conservative management course up until this time and therefore I do not feel that any additional conservative management will provide additional relief. The patient's symptoms have progressively worsened to the point where they now limit the patient's daily activities and quality of life.     At this time, I feel that this patient would be an excellent candidate for a total knee replacement. The patient has failed non-operative care and continues to have unacceptable symptoms. We discussed the treatment options and alternatives and the risks and benefits of surgery in great detail.    While no guarantees can be made, total knee replacement has a very high success rate in terms of relieving a patient's knee pain and returning them to a more active, independent lifestyle for 10-15 years or more. All surgery carries some risk; for knee replacement, the complication rate is low but may include: death (very rare), infection, bleeding requiring transfusion, blood clots in legs traveling to lungs, nerve and/or blood vessel damage, bone fracture, persistent knee pain and/or stiffness, and repeat surgery(ies). The risk of a major complication is about 1-2 per 1000 cases. Total knee replacement should only be done if conservative treatment has failed. The revision rate for total knee replacements is about 1-2% per year; in other words, 85-95% of knee replacements may last 10 years; 75-85% last 20 years; and  so on, assuming no injury to the knee. Finally we discussed anesthesia related complications which will be discussed in greater detail with the anesthesia team before surgery.     The patient was shown total knee booklets, diagrams and/or models and all of their questions have been answered at the present time. The patient may call or come in if they have any other concerns or questions. The patient also understands the post-operative rehabilitation process and the need for their cooperation and participation, and that their results may be compromised by their lack of compliance. The patient would like to proceed. Patient is encouraged to seek additional opinions if they so desire. The patient voiced their understanding of the surgical plan and potential complications and wishes to proceed with surgery.      History of Present Illness   HPI:  Iva Reid is a 70 y.o. female who presents with pain in the left knee. Patient is no longer getting adequate relief from non-operative modalities. Patient is continuing to have debilitating pain from their knee, interfering with daily activities and sleep. Patient denies any concerns with infections, new neuropathies, or any acute injuries.     Historical Information  Review Of Systems:   Skin: Normal  Neuro: See HPI  Musculoskeletal: See HPI  14 point review of systems negative except as stated above     Past Medical History:   Past Medical History:   Diagnosis Date    Anxiety     Hematuria     Hypertension     Irregular heart beat     Kidney stone May 2021       Past Surgical History:   Past Surgical History:   Procedure Laterality Date    BILATERAL SALPINGOOPHORECTOMY      last assessed 8/11/14    BREAST BIOPSY Left     EXCISIONAL BIOPSY BENIGN    CHOLECYSTECTOMY  2009    COLONOSCOPY  10/31/2022    HYSTERECTOMY  2004    JOINT REPLACEMENT Right     knee    KNEE SURGERY      OOPHORECTOMY Bilateral 2004    ((Pt Qnr Sub: decrease kidney function))        Family  History:  Family history reviewed and non-contributory  Family History   Problem Relation Age of Onset    Arthritis Mother     Irregular heart beat Mother     Hypertension Mother     Hyperlipidemia Mother     Cancer Father         Bladder - cancer treated. No longer has.    Stroke Father     Arthritis Father     ROOPA disease Sister     ROOPA disease Sister     Ulcerative colitis Daughter     No Known Problems Son     Breast cancer Maternal Grandmother 80    No Known Problems Maternal Grandfather     Dementia Paternal Grandmother     Obesity Paternal Grandmother     Emphysema Paternal Grandfather     Heart attack Paternal Grandfather     Breast cancer Maternal Aunt 70    Breast cancer Maternal Aunt 70    No Known Problems Maternal Aunt     No Known Problems Paternal Aunt     No Known Problems Paternal Aunt     Uterine cancer Cousin 40       Social History:  Social History     Socioeconomic History    Marital status: /Civil Union     Spouse name: None    Number of children: None    Years of education: None    Highest education level: None   Occupational History    None   Tobacco Use    Smoking status: Never    Smokeless tobacco: Never   Vaping Use    Vaping status: Never Used   Substance and Sexual Activity    Alcohol use: No    Drug use: No    Sexual activity: Not Currently     Partners: Male     Birth control/protection: Female Sterilization   Other Topics Concern    None   Social History Narrative    None     Social Drivers of Health     Financial Resource Strain: Not on file   Food Insecurity: Not on file   Transportation Needs: Not on file   Physical Activity: Not on file   Stress: Not on file   Social Connections: Not on file   Intimate Partner Violence: Not on file   Housing Stability: Not on file       Allergies:   No Known Allergies        Labs:  0   Lab Value Date/Time    HCT 42.4 04/07/2025 1052    HCT 28.2 (L) 05/09/2015 0558    HCT 30.3 (L) 05/08/2015 0558    HCT 39.2 04/28/2015 0936    HGB 13.5  "04/07/2025 1052    HGB 9.0 (L) 05/09/2015 0558    HGB 9.8 (L) 05/08/2015 0558    HGB 12.7 04/28/2015 0936    INR 0.97 04/07/2025 1052    INR 1.00 04/28/2015 0937    WBC 8.14 04/07/2025 1052    WBC 13.13 (H) 05/09/2015 0558    WBC 15.92 (H) 05/08/2015 0558    WBC 9.13 04/28/2015 0935    ESR 25 (H) 07/05/2014 1104       Meds:    Current Facility-Administered Medications:     bupivacaine liposomal (EXPAREL) 1.3 % injection 20 mL, 20 mL, Infiltration, Once, Irwin Willis MD    ceFAZolin (ANCEF) IVPB (premix in dextrose) 2,000 mg 50 mL, 2,000 mg, Intravenous, Once, Mynor Sarabia MD    lactated ringers infusion, 125 mL/hr, Intravenous, Continuous, Mynor Sarabia MD, Last Rate: 125 mL/hr at 05/05/25 1007, 125 mL/hr at 05/05/25 1007    povidone-iodine (BETADINE) 10 % 20 Application in sodium chloride 0.9 % 500 mL irrigation bottle, , Irrigation, Once, Mynor Sarabia MD    tranexamic acid (CYKLOKAPRON) 1000-0.7 MG/100ML-% injection 1,000 mg, 1,000 mg, Intravenous, Once, Mynor Sarabia MD    Blood Culture:   No results found for: \"BLOODCX\"    Wound Culture:   No results found for: \"WOUNDCULT\"    Ins and Outs:  No intake/output data recorded.          Physical Exam  BP (!) 177/78   Pulse 61   Temp 97.7 °F (36.5 °C) (Temporal)   Resp 15   Ht 5' 1\" (1.549 m)   Wt 71.7 kg (158 lb)   SpO2 98%   BMI 29.85 kg/m²   BP (!) 177/78   Pulse 61   Temp 97.7 °F (36.5 °C) (Temporal)   Resp 15   Ht 5' 1\" (1.549 m)   Wt 71.7 kg (158 lb)   SpO2 98%   BMI 29.85 kg/m²   Gen: No acute distress, resting comfortably in bed  HEENT: Eyes clear, moist mucus membranes, hearing intact  Respiratory: No audible wheezing or stridor  Cardiovascular: Well Perfused peripherally, 2+ distal pulse  Abdomen: nondistended, no peritoneal signs  Ortho Exam: limited ROM due to pain and mechanical blocking  Neuro Exam: intact    Lab Results: Reviewed  Imaging: Reviewed   "

## 2025-05-05 NOTE — ANESTHESIA PROCEDURE NOTES
Peripheral Block    Patient location during procedure: holding area  Start time: 5/5/2025 10:30 AM  Reason for block: at surgeon's request and post-op pain management  Staffing  Performed by: Irwin Willis MD  Authorized by: Irwin Willis MD    Preanesthetic Checklist  Completed: patient identified, IV checked, site marked, risks and benefits discussed, surgical consent, monitors and equipment checked, pre-op evaluation and timeout performed  Peripheral Block  Patient position: supine  Prep: ChloraPrep  Patient monitoring: frequent blood pressure checks, continuous pulse oximetry and heart rate  Block type: Adductor Canal  Laterality: left  Injection technique: single-shot  Procedures: ultrasound guided, Ultrasound guidance required for the procedure to increase accuracy and safety of medication placement and decrease risk of complications.  Ultrasound permanent image saved  bupivacaine (PF) (MARCAINE) 0.25 % injection 20 mL - Perineural   10 mL - 5/5/2025 10:45:00 AM  bupivacaine liposomal (EXPAREL) 1.3 % injection 20 mL - Perineural   10 mL - 5/5/2025 10:45:00 AM  Needle  Needle type: Stimuplex   Needle gauge: 20 G  Needle length: 4 in  Needle localization: anatomical landmarks and ultrasound guidance  Assessment  Injection assessment: incremental injection, frequent aspiration, injected with ease, negative aspiration, negative for heart rate change, no paresthesia on injection, no symptoms of intraneural/intravenous injection and needle tip visualized at all times  Paresthesia pain: none  Post-procedure:  site cleaned  patient tolerated the procedure well with no immediate complications

## 2025-05-05 NOTE — ANESTHESIA PROCEDURE NOTES
Peripheral Block    Patient location during procedure: holding area  Start time: 5/5/2025 10:30 AM  Reason for block: at surgeon's request and post-op pain management  Staffing  Performed by: Irwin Willis MD  Authorized by: Irwin Willis MD    Preanesthetic Checklist  Completed: patient identified, IV checked, site marked, risks and benefits discussed, surgical consent, monitors and equipment checked, pre-op evaluation and timeout performed  Peripheral Block  Patient position: supine  Prep: ChloraPrep  Patient monitoring: frequent blood pressure checks, continuous pulse oximetry and heart rate  Block type: IPACK  Laterality: left  Injection technique: single-shot  Procedures: ultrasound guided, Ultrasound guidance required for the procedure to increase accuracy and safety of medication placement and decrease risk of complications.  Ultrasound permanent image saved  fentanyl citrate (PF) 100 MCG/2ML 50 mcg - Intravenous   100 mcg - 5/5/2025 10:35:00 AM  bupivacaine liposomal (EXPAREL) 1.3 % injection 20 mL - Perineural   10 mL - 5/5/2025 10:40:00 AM  bupivacaine (PF) (MARCAINE) 0.25 % injection 20 mL - Perineural   20 mL - 5/5/2025 10:40:00 AM  Needle  Needle type: Stimuplex   Needle gauge: 20 G  Needle length: 4 in  Needle localization: anatomical landmarks and ultrasound guidance  Assessment  Injection assessment: incremental injection, frequent aspiration, injected with ease, negative aspiration, negative for heart rate change, no paresthesia on injection, no symptoms of intraneural/intravenous injection and needle tip visualized at all times  Paresthesia pain: none  Post-procedure:  site cleaned  patient tolerated the procedure well with no immediate complications

## 2025-05-05 NOTE — PLAN OF CARE
Problem: PHYSICAL THERAPY ADULT  Goal: Performs mobility at highest level of function for planned discharge setting.  See evaluation for individualized goals.  Description: Treatment/Interventions: Functional transfer training, LE strengthening/ROM, Elevations, Therapeutic exercise, Endurance training, Patient/family training, Bed mobility, Gait training, Spoke to nursing, OT  Equipment Recommended: Walker (pt owns)       See flowsheet documentation for full assessment, interventions and recommendations.  5/5/2025 1737 by Mayelin Bowen PT  Note: Prognosis: Good  Problem List: Decreased strength, Decreased range of motion, Decreased endurance, Impaired balance, Decreased mobility, Decreased safety awareness, Pain  Assessment: Pt. 70 y.o.female presents for elective surgery. Past medical hx includes HTN, PAC, neoplasm of liver. Pt admitted for Chronic pain of left knee (M25.562, G89.29)  Primary osteoarthritis of left knee (M17.12)  Effusion of left knee (M25.462). S/p L elective TKR POD #0. Pt referred to PT for functional mobility evaluation & D/C planning w/ orders of  OOB to chair . WBAT LLE. PTA, pt reports being I w/ SPC, (+) hx of falls , and I w/ RW. Personal factors affecting pt at time of IE include: bed/bath on 2nd floor, decreased independence in ADLs/IADLs, lack of insight to deficits, steps to enter, two story home, and use of AD . During evaluation, deficits included dec mobility, balance, ambulation. Required minAx1 for supine to sit, sit<>stand, toilet transfer, and ambulation. Pt able to ambulate 10'x2 with RW in room. Antalgic step to gait with inc L knee flexion. Pt demonstrated dec endurance and tolerance to activity. Denies reports of dizziness or SOB t/o session. Pt was educated on fall precautions and reinforced w/ good understanding. Pt would benefit from continued PT to address deficits as defined above and maximize level of independence with functional mobility and safety. Based on pt  presentation and impaired function, pt would benefit from level III, (minimum resource intensity) at D/C. The patient's AM-PAC Basic Mobility Inpatient Short Form Raw Score is 19. A Raw score of greater than 16 suggests the patient may benefit from discharge to home. Please also refer to the recommendation of the Physical Therapist for safe discharge planning. Nsg staff to continue to mobilized pt (OOB in chair for all meals & ambulate in room/unit) as tolerated to prevent further decline in function. Nsg notified. Co-eval performed to complete this PT evaluation for the pts best interest given pts medical complexity and functional level.  Barriers to Discharge: Inaccessible home environment  Barriers to Discharge Comments: NEHA  Rehab Resource Intensity Level, PT: III (Minimum Resource Intensity)    See flowsheet documentation for full assessment.

## 2025-05-05 NOTE — PLAN OF CARE
Problem: OCCUPATIONAL THERAPY ADULT  Goal: Performs self-care activities at highest level of function for planned discharge setting.  See evaluation for individualized goals.  Description: Treatment Interventions: ADL retraining, Functional transfer training, Endurance training, Patient/family training, Equipment evaluation/education, Compensatory technique education, Continued evaluation, Energy conservation, Activityengagement          See flowsheet documentation for full assessment, interventions and recommendations.   Note: Limitation: Decreased ADL status, Decreased endurance, Decreased self-care trans, Decreased high-level ADLs  Prognosis: Good  Assessment: Pt is a 70 y.o. female seen for OT evaluation s/p adm to [facility] on 5/5/2025 w/ chronic pain of left knee and primary osteoarthritis left knee. Comorbidities affecting pt’s functional performance include a significant PMH of anxiety, HTN, hematuria, irregular heart beat, PAC, dyslipidemia. Pt with active OT orders and activity orders for Activity beginning POD #0. Pt lives w/ spouse in a multilevel house with 4 NEHA. Pt has no bathroom on main level, tub/shower upstairs. Comfort height toilet. At baseline, pt was independent with all ADLs/IADLs. Pt completed supine to sit with Yari. Sit to stand with Yari. Pt completed functional mobility from EOB to bathroom with slow pacing and verbal cues for safety with RW and Yari. Pt completed toileting and toilet transfer with Yari. Pt completed stand to sit into chair with Yari and use of RW. Don of underwear w/pad with modA seated, then standing to pull over waist with modA and use of RW. Verbal cues to maintain hands on walker during session required. Upon evaluation, pt currently requires S for UB ADLs, Yari for LB ADLs, Yari for toileting, Yari for bed mobility, Yari for functional mobility, and Yari for transfers 2* the following deficits impacting occupational performance: weakness, decreased strength  , decreased balance, decreased activity tolerance, increased pain, and orthopedic restrictions. These impairments, as well at pt’s personal factors of: NEHA home environment, steps within home environment, difficulty performing ADLs, difficulty performing IADLs, difficulty performing transfers/mobility, WBS, fall risk , and new use of AD for functional transfers/mobility limit pt’s ability to safely engage in all baseline areas of occupation. Based on the aforementioned OT evaluation, functional performance deficits, and assessments, pt has been identified as a high complexity evaluation. Pt to continue to benefit from continued acute OT services during hospital stay to address defined deficits and to maximize level of functional independence in the following Occupational Performance areas: grooming, bathing/shower, toilet hygiene, dressing, health maintenance, functional mobility, community mobility, clothing management, cleaning, household maintenance, and job performance/volunteering. From OT standpoint, recommend III; Minimum Resource Intensity (pending progress) upon D/C. OT will continue to follow pt 3-5x/wk.     Rehab Resource Intensity Level, OT: III (Minimum Resource Intensity) (pending progress)

## 2025-05-05 NOTE — PLAN OF CARE
Problem: PAIN - ADULT  Goal: Verbalizes/displays adequate comfort level or baseline comfort level  Description: Interventions:- Encourage patient to monitor pain and request assistance- Assess pain using appropriate pain scale- Administer analgesics based on type and severity of pain and evaluate response- Implement non-pharmacological measures as appropriate and evaluate response- Consider cultural and social influences on pain and pain management- Notify physician/advanced practitioner if interventions unsuccessful or patient reports new pain  Outcome: Progressing     Problem: INFECTION - ADULT  Goal: Absence or prevention of progression during hospitalization  Description: INTERVENTIONS:- Assess and monitor for signs and symptoms of infection- Monitor lab/diagnostic results- Monitor all insertion sites, i.e. indwelling lines, tubes, and drains- Monitor endotracheal if appropriate and nasal secretions for changes in amount and color- [place] appropriate cooling/warming therapies per order- Administer medications as ordered- Instruct and encourage patient and family to use good hand hygiene technique- Identify and instruct in appropriate isolation precautions for identified infection/condition  Outcome: Progressing  Goal: Absence of fever/infection during neutropenic period  Description: INTERVENTIONS:- Monitor WBC  Outcome: Progressing     Problem: SAFETY ADULT  Goal: Patient will remain free of falls  Description: INTERVENTIONS:- Educate patient/family on patient safety including physical limitations- Instruct patient to call for assistance with activity - Consult OT/PT to assist with strengthening/mobility - Keep Call bell within reach- Keep bed low and locked with side rails adjusted as appropriate- Keep care items and personal belongings within reach- Initiate and maintain comfort rounds- Make Fall Risk Sign visible to staff- Offer Toileting every 2 Hours, in advance of need- Initiate/Maintain bed alarm-  Obtain necessary fall risk management equipment: - Apply yellow socks and bracelet for high fall risk patients- Consider moving patient to room near nurses station  Outcome: Progressing  Goal: Maintain or return to baseline ADL function  Description: INTERVENTIONS:-  Assess patient's ability to carry out ADLs; assess patient's baseline for ADL function and identify physical deficits which impact ability to perform ADLs (bathing, care of mouth/teeth, toileting, grooming, dressing, etc.)- Assess/evaluate cause of self-care deficits - Assess range of motion- Assess patient's mobility; develop plan if impaired- Assess patient's need for assistive devices and provide as appropriate- Encourage maximum independence but intervene and supervise when necessary- Involve family in performance of ADLs- Assess for home care needs following discharge - Consider OT consult to assist with ADL evaluation and planning for discharge- Provide patient education as appropriate  Outcome: Progressing  Goal: Maintains/Returns to pre admission functional level  Description: INTERVENTIONS:- Perform AM-PAC 6 Click Basic Mobility/ Daily Activity assessment daily.- Set and communicate daily mobility goal to care team and patient/family/caregiver. - Collaborate with rehabilitation services on mobility goals if consulted- Perform Range of Motion 3 times a day.- Reposition patient every 3 hours.- Dangle patient 3 times a day- Stand patient 3 times a day- Ambulate patient 3 times a day- Out of bed to chair 3 times a day - Out of bed for meals 3 times a day- Out of bed for toileting- Record patient progress and toleration of activity level   Outcome: Progressing     Problem: DISCHARGE PLANNING  Goal: Discharge to home or other facility with appropriate resources  Description: INTERVENTIONS:- Identify barriers to discharge w/patient and caregiver- Arrange for needed discharge resources and transportation as appropriate- Identify discharge learning  needs (meds, wound care, etc.)- Arrange for interpretive services to assist at discharge as needed- Refer to Case Management Department for coordinating discharge planning if the patient needs post-hospital services based on physician/advanced practitioner order or complex needs related to functional status, cognitive ability, or social support system  Outcome: Progressing     Problem: Knowledge Deficit  Goal: Patient/family/caregiver demonstrates understanding of disease process, treatment plan, medications, and discharge instructions  Description: Complete learning assessment and assess knowledge base.Interventions:- Provide teaching at level of understanding- Provide teaching via preferred learning methods  Outcome: Progressing

## 2025-05-05 NOTE — PHYSICAL THERAPY NOTE
PT EVALUATION    Pt. Name: Iva Reid  Pt. Age: 70 y.o.  MRN: [id number]  LENGTH OF STAY: 0    Patient Active Problem List   Diagnosis    Vaginal atrophy    Hypertension    PAC (premature atrial contraction)    Functional diarrhea    Angiomyolipoma of both kidneys    Nephrolithiasis    Chronic rhinitis    Disorder of sacrum    Dyslipidemia    Herpes simplex labialis    Menopause    Neoplasm of liver    Primary osteoarthritis of left knee       Admitting Diagnoses:   Chronic pain of left knee [M25.562, G89.29]  Primary osteoarthritis of left knee [M17.12]  Effusion of left knee [M25.462]    Past Medical History:   Diagnosis Date    Anxiety     Hematuria     Hypertension     Irregular heart beat     Kidney stone May 2021       Past Surgical History:   Procedure Laterality Date    BILATERAL SALPINGOOPHORECTOMY      last assessed 8/11/14    BREAST BIOPSY Left     EXCISIONAL BIOPSY BENIGN    CHOLECYSTECTOMY  2009    COLONOSCOPY  10/31/2022    HYSTERECTOMY  2004    JOINT REPLACEMENT Right     knee    KNEE SURGERY      OOPHORECTOMY Bilateral 2004    ((Pt Qnr Sub: decrease kidney function))        Imaging Studies:  No orders to display        05/05/25 1653   PT Last Visit   PT Visit Date 05/05/25   Note Type   Note type Evaluation   Pain Assessment   Pain Assessment Tool 0-10   Pain Score 4   Pain Location/Orientation Orientation: Left;Location: Knee   Hospital Pain Intervention(s) Cold applied;Repositioned;Ambulation/increased activity;Elevated;Emotional support;Rest   Restrictions/Precautions   Weight Bearing Precautions Per Order Yes   LLE Weight Bearing Per Order WBAT   Other Precautions WBS;Multiple lines;Fall Risk;Pain;Chair Alarm;Bed Alarm  (hemovac)   Home Living   Type of Home House   Home Layout Multi-level;Performs ADLs on one level;Able to live on main level with bedroom/bathroom;Stairs to enter with rails;Bed/bath upstairs  (4+1 NEHA bilateral hand rail, no bathroom on main level but has BSC; FOS to  2nd floor w/ bilateral hand rails)   Bathroom Shower/Tub Tub/shower unit   Bathroom Toilet Raised   Bathroom Equipment Commode;Grab bars in shower;Shower chair   Home Equipment Walker;Cane   Prior Function   Level of [place] Independent with ADLs;Independent with functional mobility;Independent with IADLS  (w/ SPC vs RW)   Lives With Spouse   Receives Help From Family   IADLs Independent with driving;Independent with meal prep;Independent with medication management   Falls in the last 6 months 1 to 4  (1x)   Vocational Retired   General   Family/Caregiver Present No   Cognition   Overall Cognitive Status WFL   Arousal/Participation Alert   Orientation Level Oriented X4   Following Commands Follows one step commands with increased time or repetition   Subjective   Subjective Pt agreeable to PT/Ot evaluations.   RUE Assessment   RUE Assessment   (refer to OT)   LUE Assessment   LUE Assessment   (refer to OT)   RLE Assessment   RLE Assessment WFL  (4/5 grossly)   LLE Assessment   LLE Assessment X   Strength LLE   L Knee Flexion 3/5   L Knee Extension 3/5   L Ankle Dorsiflexion 4/5   L Ankle Plantar Flexion 4/5   Light Touch   RLE Light Touch Grossly intact   LLE Light Touch Grossly intact   Bed Mobility   Supine to Sit 4  Minimal assistance   Additional items Assist x 1;HOB elevated;Bedrails;Increased time required;Verbal cues;LE management   Additional Comments Pt greeted in supine. /65. BP /62.   Transfers   Sit to Stand 4  Minimal assistance   Additional items Assist x 1;Bedrails;Increased time required;Verbal cues  (w/ RW)   Stand to Sit 4  Minimal assistance   Additional items Assist x 1;Armrests;Increased time required;Verbal cues  (w/ RW)   Toilet transfer 4  Minimal assistance   Additional items Assist x 1;Increased time required;Verbal cues;Commode  (BSC over standard toilet; w/ RW)   Additional Comments cues for hand placement; pt with inc difficulty maintaining UE on RW throughout standing  with frequent removal of bilateral UE   Ambulation/Elevation   Gait pattern Improper Weight shift;Antalgic;Decreased foot clearance;Poor UE support;Decreased L stance;Short stride;Step to;Excessively slow   Gait Assistance 4  Minimal assist   Additional items Assist x 1;Verbal cues   Assistive Device Rolling walker   Distance 10'x2   Ambulation/Elevation Additional Comments cues for RW management and LE sequencing   Balance   Static Sitting Good   Dynamic Sitting Fair +   Static Standing Fair -  (w/ RW)   Dynamic Standing Poor +  (w/ RW)   Ambulatory Poor +  (w/ RW)   Activity Tolerance   Activity Tolerance Patient tolerated treatment well   Medical Staff Made Aware OT Winter   Nurse Made Aware RN Malka   Assessment   Prognosis Good   Problem List Decreased strength;Decreased range of motion;Decreased endurance;Impaired balance;Decreased mobility;Decreased safety awareness;Pain   Assessment Pt. 70 y.o.female presents for elective surgery. Past medical hx includes HTN, PAC, neoplasm of liver. Pt admitted for Chronic pain of left knee (M25.562, G89.29)  Primary osteoarthritis of left knee (M17.12)  Effusion of left knee (M25.462). S/p L elective TKR POD #0. Pt referred to PT for functional mobility evaluation & D/C planning w/ orders of  OOB to chair . WBAT LLE. PTA, pt reports being I w/ SPC, (+) hx of falls , and I w/ RW. Personal factors affecting pt at time of IE include: bed/bath on 2nd floor, decreased independence in ADLs/IADLs, lack of insight to deficits, steps to enter, two story home, and use of AD . During evaluation, deficits included dec mobility, balance, ambulation. Required minAx1 for supine to sit, sit<>stand, toilet transfer, and ambulation. Pt able to ambulate 10'x2 with RW in room. Antalgic step to gait with inc L knee flexion. Pt demonstrated dec endurance and tolerance to activity. Denies reports of dizziness or SOB t/o session. Pt was educated on fall precautions and reinforced w/ good  understanding. Pt would benefit from continued PT to address deficits as defined above and maximize level of independence with functional mobility and safety. Based on pt presentation and impaired function, pt would benefit from level III, (minimum resource intensity) at D/C. The patient's AM-PAC Basic Mobility Inpatient Short Form Raw Score is 19. A Raw score of greater than 16 suggests the patient may benefit from discharge to home. Please also refer to the recommendation of the Physical Therapist for safe discharge planning. Nsg staff to continue to mobilized pt (OOB in chair for all meals & ambulate in room/unit) as tolerated to prevent further decline in function. Nsg notified. Co-eval performed to complete this PT evaluation for the pts best interest given pts medical complexity and functional level.   Barriers to Discharge Inaccessible home environment   Barriers to Discharge Comments NEHA   Goals   Patient Goals to use bathroom   STG Expiration Date 05/12/25   Short Term Goal #1 1) Inc overall LE strength by 1/2 MMT grade to improve functional mobility; 2) Pt will demonstrate improved bed mobility with mod I to dec caregiver burden; 3) Pt will demonstrate improved transfers w/ mod I for inc safety; 4) Pt will be able to amb w/ mdo I >150' w/ RW for household distances to inc safety and dec caregiver burden; 5) Pt will be able to navigate stairs with mod I to dec caregiver burden and inc safety with functional mobility; 6) Improve general balance by 1 grade to inc safety; 7) PT for ongoing patient and caregiver education   PT Treatment Day 0   Plan   Treatment/Interventions Functional transfer training;LE strengthening/ROM;Elevations;Therapeutic exercise;Endurance training;Patient/family training;Bed mobility;Gait training;Spoke to nursing;OT   PT Frequency Twice a day  (PRN)   Discharge Recommendation   Rehab Resource Intensity Level, PT III (Minimum Resource Intensity)   Equipment Recommended Walker  (pt  owns)   AM-PAC Basic Mobility Inpatient   Turning in Flat Bed Without Bedrails 4   Lying on Back to Sitting on Edge of Flat Bed Without Bedrails 3   Moving Bed to Chair 3   Standing Up From Chair Using Arms 3   Walk in Room 3   Climb 3-5 Stairs With Railing 3   Basic Mobility Inpatient Raw Score 19   Basic Mobility Standardized Score 42.48   [facility] Highest Level Of Mobility   -HLM Goal 6: Walk 10 steps or more   JH-HLM Achieved 6: Walk 10 steps or more   End of Consult   Patient Position at End of Consult Bedside chair;Bed/Chair alarm activated;All needs within reach   End of Consult Comments BP in chair at end of sessin 118/57       Hx/personal factors: co-morbidities, inaccessible home, dec caregiver support, advanced age, mutliple lines, use of AD, pain, h/o of falls, and fall risk, coping styles, social background, past experience, behavior pattern  Examination: dec mobility, dec balance, dec endurance, dec amb, risk for falls, pain, assessed body system, balance, endurance, amb, D/C disposition & fall risk, impairements in locomotion, musculoskeletal, balance, endurance, posture, coordination, assessed cognition, impairments in systems including multiple body structures involved; musculoskeletal (ROM, strength, posture, BMI), neuromuscular (balance,locomotion, gait, transfers, motor control and learning, sensation), joint integrity, integumentary (skin integrity, presence of scars or wounds), cardiopulmonary (vitals, edema); activity limitations (difficulties executing an action); participation restrictions (problems associated w involvement in life situations)  Clinical: unpredictable (ongoing medical status, risk for falls, POD #0, and pain mgt)  Complexity: high      Mayelin Bowen, PT

## 2025-05-05 NOTE — ANESTHESIA PROCEDURE NOTES
Spinal Block    Patient location during procedure: OR  Start time: 5/5/2025 11:33 AM  Reason for block: procedure for pain and at surgeon's request  Staffing  Performed by: Irwin Willis MD  Authorized by: Irwin Willis MD    Preanesthetic Checklist  Completed: patient identified, IV checked, site marked, risks and benefits discussed, surgical consent, monitors and equipment checked, pre-op evaluation and timeout performed  Spinal Block  Patient position: sitting  Prep: ChloraPrep  Patient monitoring: heart rate, continuous pulse ox and frequent blood pressure checks  Approach: midline  Location: L2-3  Needle  Needle type: Pencan   Needle gauge: 24 G  Needle length: 4 in  Assessment  Sensory level: T4  Injection Assessment:  negative aspiration for heme, no paresthesia on injection and positive aspiration for clear CSF.  Post-procedure:  site cleaned

## 2025-05-05 NOTE — OP NOTE
OPERATIVE REPORT  PATIENT NAME: Iva Reid  : 1955  MRN: [id number]  Pt Location:  [facility] OR ROOM 05    Surgery Date: 2025    Surgeons and Role:     * Mynor Sarabia MD - Primary     * Briana Joseph PA-C - Assisting     Preop Diagnosis:  Chronic pain of left knee [M25.562, G89.29]  Primary osteoarthritis of left knee [M17.12]  Effusion of left knee [M25.462]    Post-Op Diagnosis Codes:     * Chronic pain of left knee [M25.562, G89.29]     * Primary osteoarthritis of left knee [M17.12]     * Effusion of left knee [M25.462]    Procedure(s):  Left - ARTHROPLASTY KNEE TOTAL W ROBOT    Specimens:  * No specimens in log *    Estimated Blood Loss:   Minimal    Drains:  Closed/Suction Drain Anterior;Left Knee Accordion 10 Fr. (Active)   Number of days: 0       [REMOVED] Urethral Catheter Non-latex;Straight-tip 16 Fr. (Removed)   Number of days: 0       Anesthesia Type:   Choice     Operative Indications:  Chronic pain of left knee [M25.562, G89.29]  Primary osteoarthritis of left knee [M17.12]  Effusion of left knee [M25.462]    Operative Findings:  Depuy attune   Femur-5N   Poly-5   Tibia-4   Patella-35    Complications:   None    Knee Technique: Suture (direct) Repair  Knee Approach: Medial Parapatellar    Chronic Narcotic Use:  No      Procedure and Technique:  Following the induction of adequate level of spinal anesthesia, a Rider catheter was then sterilely introduced into this patient's bladder.  Antibiotics administered.  The left thigh was not fitted with a thigh-high tourniquet.  The left lower extremity then underwent sterile prep drape.  The left lower extremity was exsanguinated to gravity, tourniquet inflated to 300 mmHg.  A midline knee incision was greater than in flexion.  Full-thickness flaps were raised in order to access the extensor mechanism.  A medial arthrotomy was created to open up the knee joint.  2 half pins were placed within the proximal tibia, 2 half pins were placed within  the distal femur.  In doing so, modules were created.  The arrays were then attached to the modules.  Checkpoints made the proximal tibia distal femur.  The knee was then registered.  The surgery was planned out on the computer, the plan was finalized.  The robot was docked.  The first maneuver of all the distal femoral cut followed by anterior posterior cuts.  The chamfer cuts completed the process.  The proximal tibia cut was made next.  Care was taken preserve the intake of the medial collateral, lateral collateral, and posterior structures during these maneuvers.  The box cut was made for the posterior stabilized unit, remnant medial and lateral meniscectomies then performed.  Trials were inserted, the knee was taken through range of motion, and found to be capable of full extension, good flexion, stable throughout.  The patella was then resurfaced while utilizing manufactures [employer], is found to be a size 35 mm button.  The trial components removed and the knee was prepared for insertion of cemented components.  The cemented tibia, cemented femur, trial poly-, cemented patella in place.  Excess cement was removed, the knee was brought to extension.  The cement was allowed to cure.  The trial poly was taken out, the knee was packed off.  The tourniquet was deflated, and hemostasis was secured.  The insert polyethylene was then snapped into position.  The knee was taken through a final range of motion, and found to be capable full extension, good flexion, stable throughout, and excellent patella tracking.  Satisfied with the extent of surgery, the wounds were flushed with saline and closed.  A Betadine soak initiated.  A drain is placed deep brought via separate lateral stab incision.  The arthrotomy was closed with number Vicryl suture.  The subcu tissue was closed with 2-0 Vicryl and the skin was closed with staples.  Sterile dressings were applied.  She was then transferred to recovery room in stable  condition with plans to include physical therapy for weightbearing to tolerance.  She will require DVT prophylaxis with Lovenox.  Please note, there is no qualified orthopedic resident was available to assist, the assistance of Briana Joseph PA-C was instrumental in the safe execution of this patient surgery.  Start the patient position, patient prepped draped, intraoperative assistance, wound closure, dressing application, patient transfers, all of these were performed under my direct supervision   I was present for the entire procedure.    Patient Disposition:  PACU             SIGNATURE: Mynor Sarabia MD  DATE: May 5, 2025  TIME: 12:44 PM

## 2025-05-05 NOTE — CONSULTS
Assessment & Plan  Primary osteoarthritis of left knee  -DVT prophylaxis in place-follow-up CBC in a.m. monitoring postoperative hemoglobin-pain control per primary service-ongoing PT evaluation for disposition-follow closely postoperative hemodynamics-overnight medical support    Hypertension  Continue amlodipine and metoprolol  Hold acei for initial 24 hr  PAC (premature atrial contraction)  Continue metoprolol  Dyslipidemia  Continue low cholesterol diet and statin therapy      PRE-OP HGB LEVEL:   Lab Results   Component Value Date    HGB 13.5 04/07/2025         Subjective/ HPI: Iva Reid was seen and examined. Hx of KNEE pain failed out patient conservative measures. Elected to undergo total KNEE arthroplasty We are asked to see patient for post op management of underlying medical co-morbidities as outlined above.           ROS:   A 10 point ROS was performed; negative except as noted above.     Past medical history    Past Medical History:   Diagnosis Date    Anxiety     Hematuria     Hypertension     Irregular heart beat     Kidney stone May 2021       Social History:    Substance Use History:   Social History     Substance and Sexual Activity   Alcohol Use No     Social History     Tobacco Use   Smoking Status Never   Smokeless Tobacco Never     Social History     Substance and Sexual Activity   Drug Use No       Family History:    Family history non-contributory    Medications Prior to Admission  Current Outpatient Medications on File Prior to Encounter   Medication Sig    ALPRAZolam (XANAX) 0.5 mg tablet Take by mouth as needed    amLODIPine-benazepril (LOTREL 5-20) 5-20 MG per capsule     ascorbic acid (VITAMIN C) 500 MG tablet Take 1 tablet (500 mg total) by mouth daily Start 30 days prior to surgery    Cholecalciferol (Vitamin D3) 50 MCG (2000 UT) capsule     DHA-EPA-Flaxseed Oil-Vitamin E (THERA TEARS) CAPS Take by mouth    escitalopram (LEXAPRO) 10 mg tablet Take 10 mg by mouth daily    ferrous  sulfate 324 (65 Fe) mg Take 1 tablet (324 mg total) by mouth daily before breakfast Start 30 days prior to surgery    folic acid (FOLVITE) 1 mg tablet Take 1 tablet (1 mg total) by mouth daily Start 30 days prior to surgery    Glycerin, PF, (Optase Dry Eye Intense) 0.2 % SOLN     meloxicam (MOBIC) 15 mg tablet     metoprolol succinate (TOPROL-XL) 25 mg 24 hr tablet TAKE 1 TABLET BY MOUTH EVERY DAY    multivitamin (THERAGRAN) TABS Take 1 tablet by mouth daily    mupirocin (BACTROBAN) 2 % ointment Apply topically 2 (two) times a day Apply pea size amount to each nostril 2x/day for 5 days prior to procedure    nystatin-triamcinolone (MYCOLOG-II) cream Apply topically 2 (two) times a day    Carboxymethylcellul-Glycerin (Refresh Optive) 1-0.9 % GEL  (Patient not taking: Reported on 12/16/2024)    cephalexin (KEFLEX) 500 mg capsule TAKE 4 CAPS 1 HOUR PRIOR TO DENTAL APPT    enoxaparin (LOVENOX) 40 mg/0.4 mL Inject 0.4 mL (40 mg total) under the skin daily for 28 days Start injections after surgery        Review of Scheduled Meds:  Current Facility-Administered Medications   Medication Dose Route Frequency Provider Last Rate    acetaminophen  975 mg Oral Q6H PRN Briana Joseph PA-C      ALPRAZolam  0.5 mg Oral 4x Daily PRN Briana Joseph PA-C      calcium carbonate  1,000 mg Oral Daily PRN Briana Joseph PA-C      cefazolin  1,000 mg Intravenous Q8H Briana Joseph PA-C      docusate sodium  100 mg Oral BID Briana Joseph PA-C      enoxaparin  40 mg Subcutaneous Daily Briana Joseph PA-C      [START ON 5/6/2025] escitalopram  10 mg Oral Daily Briana Joseph PA-C      gabapentin  100 mg Oral Q8H Briana Joseph PA-C      HYDROmorphone  0.5 mg Intravenous Q2H PRN KRISTA Rocha-MELISSA      lactated ringers  1,000 mL Intravenous Once PRN KRISTA Rocha-MELISSA      And    lactated ringers  1,000 mL Intravenous Once PRN Briana Joseph PA-C      lactated ringers  125 mL/hr Intravenous Continuous Briana Joseph PA-C      lactated ringers  125 mL/hr  "Intravenous Continuous Mynor Sarabia MD Stopped (05/05/25 1257)    methocarbamol  500 mg Oral Q6H DANIELLE Briana Joseph PA-C      [START ON 5/6/2025] metoprolol succinate  25 mg Oral Daily Briana Joseph PA-C      ondansetron  4 mg Intravenous Q6H PRN KRISTA Rocha-MELISSA      oxyCODONE  10 mg Oral Q4H PRN KRISTA Rocha-C      oxyCODONE  5 mg Oral Q4H PRN KRISTA Rocha-C      sodium chloride  1,000 mL Intravenous Once PRN KRISTA Rocha-C      And    sodium chloride  1,000 mL Intravenous Once PRN KRISTA Rocha-C         Labs:               Invalid input(s): \"LABGLOM\", \"CMP\"                   No results found for: \"BLOODCX\", \"URINECX\", \"WOUNDCULT\", \"SPUTUMCULTUR\"    Input and Output Summary (last 24 hours):       Intake/Output Summary (Last 24 hours) at 5/5/2025 1414  Last data filed at 5/5/2025 1257  Gross per 24 hour   Intake 1250 ml   Output 225 ml   Net 1025 ml       Imaging:     No orders to display       *Labs /Radiology studiesLabs reviewed  *Medications reviewed and reconciled as needed  *Please refer to order section for additional ordered labs studies  *Case discussed with primary attending during morning huddle case rounds    Vitals:   /76 (BP Location: Left arm)   Pulse (!) 53   Temp (!) 97.3 °F (36.3 °C) (Oral)   Resp 16   Ht 5' 1\" (1.549 m)   Wt 71.7 kg (158 lb)   SpO2 95%   BMI 29.85 kg/m²       Physical Exam:   General Appearance: no distress, conversive  HEENT: PERRLA, conjuctiva normal; oropharynx clear; mucous membranes moist;   Neck:  Supple, no lymphadenopathy or thyromegaly  Lungs: clear bilaterally, normal respiratory effort, no retractions, expiratory effort normal  CV: S1 S2, no murmurs rubs or gallops, rate is regular   ABD: soft non tender, +BS x4  EXT: DP pulses intact, no lymphadenopathy, no edema ;  left KNEE dressing in place  Skin: normal turgor, normal texture, no rash  Psych: affect normal, mood normal  Neuro: AAOx3          Invasive Devices       Peripheral " Intravenous Line  Duration             Peripheral IV Left Antecubital -- days    Peripheral IV 05/05/25 Right Hand <1 day              Drain  Duration             Closed/Suction Drain Anterior;Left Knee Accordion 10 Fr. <1 day                       Code Status: Level 1 - Full Code  Current Length of Stay: 0 day(s)    Total floor / unit time spent today 30 minutes  Coordination of patient's care was performed in conjunction with primary service. Time invested included review of patient's labs, vitals, and management of their comorbidities with continued monitoring, examination of patient as well as answering patient questions, documenting her findings and creating progress note in electronic medical record,  ordering appropriate diagnostic testing. Medical decision making for the day was made by supervising physician unless otherwise noted in their attestation statement.    ** Please Note: Fluency Direct voice to text software may have been used in the creation of this document. Audio transcription errors may occur**

## 2025-05-05 NOTE — ANESTHESIA POSTPROCEDURE EVALUATION
Post-Op Assessment Note    CV Status:  Stable    Pain management: adequate       Mental Status:  Alert and awake   Hydration Status:  Euvolemic   PONV Controlled:  Controlled   Airway Patency:  Patent     Post Op Vitals Reviewed: Yes    No anethesia notable event occurred.    Staff: Anesthesiologist       Last Filed PACU Vitals:  Vitals Value Taken Time   Temp 97.7 °F (36.5 °C) 05/05/25 1255   Pulse 54 05/05/25 1337   /66 05/05/25 1330   Resp 18 05/05/25 1336   SpO2 96 % 05/05/25 1337   Vitals shown include unfiled device data.    Modified Becca:     Vitals Value Taken Time   Activity 1 05/05/25 1315   Respiration 2 05/05/25 1315   Circulation 2 05/05/25 1315   Consciousness 1 05/05/25 1315   Oxygen Saturation 2 05/05/25 1315     Modified Becca Score: 8

## 2025-05-05 NOTE — ANESTHESIA PREPROCEDURE EVALUATION
Procedure:  ARTHROPLASTY KNEE TOTAL W ROBOT (Left: Knee)    H/o contralateral knee arthroplasty about 10 years ago under spinal with no anesthesia complications per patinet     Relevant Problems   CARDIO   (+) Hypertension   (+) PAC (premature atrial contraction)      GI/HEPATIC   (+) Neoplasm of liver      /RENAL   (+) Angiomyolipoma of both kidneys   (+) Nephrolithiasis      MUSCULOSKELETAL   (+) Primary osteoarthritis of left knee        Physical Exam    Airway    Mallampati score: III  TM Distance: >3 FB  Neck ROM: full     Dental   No notable dental hx     Cardiovascular  Cardiovascular exam normal    Pulmonary  Pulmonary exam normal     Other Findings  post-pubertal.    Anesthesia Plan  ASA Score- 2     Anesthesia Type- spinal with ASA Monitors.         Additional Monitors:     Airway Plan:            Plan Factors-Exercise tolerance (METS): >4 METS.    Chart reviewed. EKG reviewed.  Existing labs reviewed. Patient summary reviewed.    Patient is not a current smoker.              Induction-     Postoperative Plan- Plan for postoperative opioid use.         Informed Consent- Anesthetic plan and risks discussed with patient.  I personally reviewed this patient with the CRNA. Discussed and agreed on the Anesthesia Plan with the CRNA..    NPO Status:  Vitals Value Taken Time   Date of last liquid 05/05/25 05/05/25 0941   Time of last liquid 0800 05/05/25 0941   Date of last solid 05/04/25 05/05/25 0941   Time of last solid 2100 05/05/25 0941

## 2025-05-06 VITALS
RESPIRATION RATE: 16 BRPM | HEART RATE: 58 BPM | BODY MASS INDEX: 29.83 KG/M2 | SYSTOLIC BLOOD PRESSURE: 156 MMHG | DIASTOLIC BLOOD PRESSURE: 75 MMHG | OXYGEN SATURATION: 95 % | HEIGHT: 61 IN | TEMPERATURE: 97.3 F | WEIGHT: 158 LBS

## 2025-05-06 LAB
ALBUMIN SERPL BCG-MCNC: 3.8 G/DL (ref 3.5–5)
ALP SERPL-CCNC: 41 U/L (ref 34–104)
ALT SERPL W P-5'-P-CCNC: 16 U/L (ref 7–52)
ANION GAP SERPL CALCULATED.3IONS-SCNC: 7 MMOL/L (ref 4–13)
AST SERPL W P-5'-P-CCNC: 17 U/L (ref 13–39)
BILIRUB SERPL-MCNC: 1.01 MG/DL (ref 0.2–1)
BUN SERPL-MCNC: 10 MG/DL (ref 5–25)
CALCIUM SERPL-MCNC: 9.1 MG/DL (ref 8.4–10.2)
CHLORIDE SERPL-SCNC: 105 MMOL/L (ref 96–108)
CO2 SERPL-SCNC: 28 MMOL/L (ref 21–32)
CREAT SERPL-MCNC: 0.58 MG/DL (ref 0.6–1.3)
ERYTHROCYTE [DISTWIDTH] IN BLOOD BY AUTOMATED COUNT: 12 % (ref 11.6–15.1)
GFR SERPL CREATININE-BSD FRML MDRD: 94 ML/MIN/1.73SQ M
GLUCOSE P FAST SERPL-MCNC: 139 MG/DL (ref 65–99)
GLUCOSE SERPL-MCNC: 139 MG/DL (ref 65–140)
HCT VFR BLD AUTO: 37.2 % (ref 34.8–46.1)
HGB BLD-MCNC: 12.1 G/DL (ref 11.5–15.4)
MCH RBC QN AUTO: 30.6 PG (ref 26.8–34.3)
MCHC RBC AUTO-ENTMCNC: 32.5 G/DL (ref 31.4–37.4)
MCV RBC AUTO: 94 FL (ref 82–98)
PLATELET # BLD AUTO: 251 THOUSANDS/UL (ref 149–390)
PMV BLD AUTO: 10.5 FL (ref 8.9–12.7)
POTASSIUM SERPL-SCNC: 4.2 MMOL/L (ref 3.5–5.3)
PROT SERPL-MCNC: 6.6 G/DL (ref 6.4–8.4)
RBC # BLD AUTO: 3.96 MILLION/UL (ref 3.81–5.12)
SODIUM SERPL-SCNC: 140 MMOL/L (ref 135–147)
WBC # BLD AUTO: 12.95 THOUSAND/UL (ref 4.31–10.16)

## 2025-05-06 PROCEDURE — 97116 GAIT TRAINING THERAPY: CPT | Performed by: PHYSICAL THERAPIST

## 2025-05-06 PROCEDURE — 80053 COMPREHEN METABOLIC PANEL: CPT

## 2025-05-06 PROCEDURE — 99024 POSTOP FOLLOW-UP VISIT: CPT | Performed by: PHYSICIAN ASSISTANT

## 2025-05-06 PROCEDURE — 85027 COMPLETE CBC AUTOMATED: CPT

## 2025-05-06 PROCEDURE — 97530 THERAPEUTIC ACTIVITIES: CPT | Performed by: PHYSICAL THERAPIST

## 2025-05-06 PROCEDURE — 99232 SBSQ HOSP IP/OBS MODERATE 35: CPT | Performed by: INTERNAL MEDICINE

## 2025-05-06 PROCEDURE — 97535 SELF CARE MNGMENT TRAINING: CPT

## 2025-05-06 RX ORDER — METHOCARBAMOL 500 MG/1
500 TABLET, FILM COATED ORAL 3 TIMES DAILY PRN
Qty: 60 TABLET | Refills: 0 | Status: SHIPPED | OUTPATIENT
Start: 2025-05-06

## 2025-05-06 RX ORDER — ACETAMINOPHEN 500 MG
1000 TABLET ORAL EVERY 6 HOURS PRN
Qty: 120 TABLET | Refills: 2 | Status: SHIPPED | OUTPATIENT
Start: 2025-05-06

## 2025-05-06 RX ORDER — OXYCODONE HYDROCHLORIDE 5 MG/1
5 TABLET ORAL EVERY 6 HOURS PRN
Qty: 30 TABLET | Refills: 0 | Status: SHIPPED | OUTPATIENT
Start: 2025-05-06 | End: 2025-05-13 | Stop reason: SDUPTHER

## 2025-05-06 RX ADMIN — CEFAZOLIN SODIUM 1000 MG: 1 SOLUTION INTRAVENOUS at 03:29

## 2025-05-06 RX ADMIN — ACETAMINOPHEN 975 MG: 325 TABLET ORAL at 08:44

## 2025-05-06 RX ADMIN — DOCUSATE SODIUM 100 MG: 100 CAPSULE, LIQUID FILLED ORAL at 08:39

## 2025-05-06 RX ADMIN — METOPROLOL SUCCINATE 25 MG: 25 TABLET, EXTENDED RELEASE ORAL at 08:39

## 2025-05-06 RX ADMIN — ESCITALOPRAM OXALATE 10 MG: 10 TABLET ORAL at 08:39

## 2025-05-06 RX ADMIN — ENOXAPARIN SODIUM 40 MG: 40 INJECTION SUBCUTANEOUS at 08:44

## 2025-05-06 RX ADMIN — METHOCARBAMOL 500 MG: 500 TABLET ORAL at 06:06

## 2025-05-06 RX ADMIN — GABAPENTIN 100 MG: 100 CAPSULE ORAL at 06:06

## 2025-05-06 RX ADMIN — AMLODIPINE BESYLATE 5 MG: 5 TABLET ORAL at 08:39

## 2025-05-06 RX ADMIN — OXYCODONE HYDROCHLORIDE 10 MG: 10 TABLET ORAL at 06:27

## 2025-05-06 NOTE — OCCUPATIONAL THERAPY NOTE
Occupational Therapy Progress Note     Patient Name: Iva Reid  Today's Date: 5/6/2025  Problem List  Principal Problem:    Primary osteoarthritis of left knee  Active Problems:    Hypertension    PAC (premature atrial contraction)    Dyslipidemia       05/06/25 1006   OT Last Visit   OT Visit Date 05/06/25   Note Type   Note Type Treatment   Pain Assessment   Pain Assessment Tool 0-10   Pain Score 3   Pain Location/Orientation Orientation: Left;Location: Knee   Hospital Pain Intervention(s) Repositioned;Ambulation/increased activity;Emotional support;Rest   Restrictions/Precautions   Weight Bearing Precautions Per Order Yes   LLE Weight Bearing Per Order WBAT   Other Precautions WBS;Multiple lines;Fall Risk;Pain   Lifestyle   Autonomy Independent with all ADLs/IADLs, use of cane vs RW at baseline   Reciprocal Relationships Spouse   Service to Others Retired   Intrinsic Gratification walking, puzzles   ADL   Where Assessed Chair   Grooming Assistance 5  Supervision/Setup   Grooming Deficit Setup;Verbal cueing;Supervision/safety;Increased time to complete;Standing with assistive device   UB Dressing Assistance 5  Supervision/Setup   UB Dressing Deficit Setup;Verbal cueing;Supervision/safety;Increased time to complete   LB Dressing Assistance 5  Supervision/Setup   LB Dressing Deficit Setup;Requires assistive device for steadying;Verbal cueing;Supervision/safety;Increased time to complete   Toileting Assistance  5  Supervision/Setup   Toileting Deficit Setup;Supervison/safety;Verbal cueing;Increased time to complete   Functional Standing Tolerance   Time ~1-2 min   Activity standing for grooming and dressing tasks   Comments use of RW for stability   Bed Mobility   Supine to Sit Unable to assess   Sit to Supine Unable to assess   Additional Comments Pt greeted on toilet   Transfers   Sit to Stand 5  Supervision   Additional items Increased time required;Verbal cues  (RW)   Stand to Sit 5  Supervision    Additional items Increased time required;Verbal cues  (RW)   Toilet transfer 5  Supervision   Additional items Increased time required;Verbal cues;Standard toilet;Commode;Other  (BSC over standard toilet, use of RW)   Additional Comments verbal cues for hand placement, increased cues for safety with LE management   Functional Mobility   Functional Mobility 5  Supervision   Additional Comments Pt completed functional mobility in room distance with use of RW and S   Additional items Rolling walker   Cognition   Overall Cognitive Status WFL   Arousal/Participation Alert;Cooperative   Attention Within functional limits   Orientation Level Oriented X4   Memory Within functional limits   Following Commands Follows one step commands with increased time or repetition   Comments Cooperative and motivated   Activity Tolerance   Activity Tolerance Patient tolerated treatment well   Medical Staff Made Aware RN Malka   Assessment   Assessment Pt seen for OT treatment session focusing on ADLs/IADLs, functional mobility, functional standing tolerance, functional transfers, patient education, continued evaluation. Pt greeted on toilet at start of session. Pt alert and cooperative throughout session. Pt with good sitting balance and fair - dynamic standing balance. Pt tolerated treatment well. Pt completed toileting on BSC over standard toilet with S and use of armrests. Pt completed perineal hygiene seated and standing with verbal cues for proper technique. Educated on proper technique when completing BM in future. Pt completed functional mobility to sink with use of RW and S. Pt completed grooming at sink with use of RW and S. Pt completed functional mobility functional distance with use of RW and S. Pt completed don of underwear and pants seated in chair with S then standing to pull over waist with use of RW for stability. Pt completed don of bra and shirt seated in chair with S. Pt educated on don of socks and shoes with  assistance from spouse. Pt educated on proper icing and LE management for independence and safety at home. Pt educated on all ADLs/IADLs, transfers and LE management for independence at  home. Pt educated on sponge bathing in bathroom during first few days prior to getting cleared to fully shower. Pt reports 3/10 pain and no dizziness. Pt's vitals include: stable.     Pt ended session seated OOB in recliner. Call bell and phone within reach. All needs met and pt reports no further questions at this time. Continue to recommend No post-acute rehabilitation needs when medically cleared. OT will continue to follow pt on caseload.   Plan   Treatment Interventions ADL retraining;Functional transfer training;Endurance training;Patient/family training;Equipment evaluation/education;Compensatory technique education;Continued evaluation;Energy conservation;Activityengagement   Goal Expiration Date 05/12/25   OT Treatment Day 1   OT Frequency 3-5x/wk   Discharge Recommendation   Rehab Resource Intensity Level, OT No post-acute rehabilitation needs   Additional Comments  The patient's raw score on the AM-PAC Daily Activity Inpatient Short Form is 22. A raw score of greater than or equal to 19 suggests the patient may benefit from discharge to home. Please refer to the recommendation of the Occupational Therapist for safe discharge planning.   -PAC Daily Activity Inpatient   Lower Body Dressing 3   Bathing 3   Toileting 4   Upper Body Dressing 4   Grooming 4   Eating 4   Daily Activity Raw Score 22   Daily Activity Standardized Score (Calc for Raw Score >=11) 47.1   AM-PAC Applied Cognition Inpatient   Following a Speech/Presentation 4   Understanding Ordinary Conversation 4   Taking Medications 4   Remembering Where Things Are Placed or Put Away 4   Remembering List of 4-5 Errands 4   Taking Care of Complicated Tasks 4   Applied Cognition Raw Score 24   Applied Cognition Standardized Score 62.21   End of Consult   Education  Provided Yes   Patient Position at End of Consult Bedside chair;Bed/Chair alarm activated;All needs within reach   Nurse Communication Nurse aware of consult   End of Consult Comments Pt seated OOB in chair with chair alarm activated at end of session. Call bell and phone within reach. All needs met and pt reports no further questions for OT at this time.   Winter Nelson, OT

## 2025-05-06 NOTE — ASSESSMENT & PLAN NOTE
POD 1 s/p left total knee arthroplasty  Weightbearing as tolerated left lower extremity  PT/OT for ambulation training  DVT prophylaxis with Lovenox  Analgesics as needed for pain  Hemovac drain was removed today without complication, 125 cc total output since surgery  Discharge planning

## 2025-05-06 NOTE — PHYSICAL THERAPY NOTE
PT PROGRESS NOTE    Name: Iva Reid  AGE: 70 y.o.  MRN: [id number]  LENGTH OF STAY: 0     05/06/25 1012   PT Last Visit   PT Visit Date 05/06/25   Note Type   Note Type Treatment   Pain Assessment   Pain Assessment Tool 0-10   Pain Score 7   Pain Location/Orientation Orientation: Left;Location: Knee   Hospital Pain Intervention(s) Repositioned;Ambulation/increased activity;Emotional support;Elevated;Rest   Restrictions/Precautions   Weight Bearing Precautions Per Order Yes   LLE Weight Bearing Per Order WBAT   Other Precautions WBS;Fall Risk;Pain   General   Chart Reviewed Yes   Response to Previous Treatment Patient with no complaints from previous session.   Family/Caregiver Present No   Cognition   Overall Cognitive Status WFL   Arousal/Participation Alert;Cooperative   Attention Within functional limits   Orientation Level Oriented X4   Following Commands Follows one step commands with increased time or repetition   Subjective   Subjective I have some questions.   Bed Mobility   Additional Comments Pt greeted OOB in chair.   Transfers   Sit to Stand 5  Supervision   Additional items Armrests;Increased time required;Verbal cues  (w/ RW)   Stand to Sit 5  Supervision   Additional items Armrests;Increased time required;Verbal cues  (w/ RW)   Toilet transfer 5  Supervision   Additional items Increased time required;Verbal cues;Commode  (w/ RW; BSC over toilet)   Tub transfer 5  Supervision   Additional items Increased time required;Verbal cues   Additional Comments cues for hand placement   Ambulation/Elevation   Gait pattern Improper Weight shift;Antalgic;Decreased foot clearance;Decreased L stance;Step to   Gait Assistance 5  Supervision   Additional items Verbal cues   Assistive Device Rolling walker   Distance 60'x2   Stair Management Assistance 5  Supervision   Additional items Verbal cues   Stair Management Technique One rail R;Two rails;Step to pattern;Foreward;Nonreciprocal;With walker;With cane    Number of Stairs   (6 in landing step x1; stair trainers 5x4)   Ambulation/Elevation Additional Comments performed multiple trials of stair navigation to simulate multiple home setups   Balance   Static Sitting Good   Dynamic Sitting Fair +   Static Standing Fair  (w/ RW)   Dynamic Standing Fair -  (w/ RW)   Ambulatory Fair -  (w/ RW)   Activity Tolerance   Activity Tolerance Patient tolerated treatment well   Nurse Made Aware RN Malka   Exercises   TKR   (Reviewed HEP and provided handout. All questions and concerns addressed at this time.)   Assessment   Prognosis Good   Problem List Decreased strength;Decreased range of motion;Decreased endurance;Impaired balance;Decreased mobility;Decreased safety awareness;Pain   Assessment Patient was seen today per POC. Overall, pt demonstrated improved mobility and inc tolerance to activity. Pain 7/10 in L knee. Required S for sit<>stand, tub transfer, ambulation, stair navigation, and toilet transfer. Pt able to ambulate 60'x2 with RW and S in unit. Antalgic step to  gait with no gross LOB noted. Able ot perform nonreciprocal stair navigation with unilateral hand rail and SPC and bilateral hand rails to simulate multiple home setup ups. Use of RW for landing stair navigation with continued nonreciprocal gait. Reviewed HEP and provided handout. All questions and concerns addressed at this time. No reports of dizziness t/o session. Will continue to see pt per POC as tolerated. From PT standpoint continued recommendation for level III, (minimum resource intensity) at D/C when medically cleared based current function. The patient's AM-PAC Basic Mobility Inpatient Short Form Raw Score is 23. A Raw score of greater than 16 suggests the patient may benefit from discharge to home. Please also refer to the recommendation of the Physical Therapist for safe discharge planning. Nsg staff to continue to mobilized pt (OOB in chair for all meals & ambulate in room/unit) as tolerated to  prevent further decline in function. Nsg staff notified. From PT stand point, pt cleared functionally for D/C when medically appropriate.   Barriers to Discharge None   Barriers to Discharge Comments From PT stand point, pt cleared functionally for D/C when medically appropriate.   Goals   Patient Goals to go home   STG Expiration Date 05/12/25   PT Treatment Day 1   Plan   Treatment/Interventions Functional transfer training;LE strengthening/ROM;Elevations;Therapeutic exercise;Endurance training;Patient/family training;Bed mobility;Gait training;Spoke to nursing;OT   Progress Progressing toward goals   PT Frequency 5-7x/wk   Discharge Recommendation   Rehab Resource Intensity Level, PT III (Minimum Resource Intensity)   AM-PAC Basic Mobility Inpatient   Turning in Flat Bed Without Bedrails 4   Lying on Back to Sitting on Edge of Flat Bed Without Bedrails 4   Moving Bed to Chair 4   Standing Up From Chair Using Arms 4   Walk in Room 4   Climb 3-5 Stairs With Railing 3   Basic Mobility Inpatient Raw Score 23   Basic Mobility Standardized Score 50.88   [facility] Highest Level Of Mobility   JH-HLM Goal 7: Walk 25 feet or more   -HLM Achieved 7: Walk 25 feet or more   Education   Education Provided Mobility training;Home exercise program;Precautions for total hip arthroplasty (RODERICK);Assistive device   Patient Demonstrates acceptance/verbal understanding   End of Consult   End of Consult Comments Pt in bathroom with OT at end of session.       Mayelin Bowen, PT

## 2025-05-06 NOTE — CONSULTS
Consultation - Hospitalist   Name: Iva Reid 70 y.o. female I MRN: [id number]  Unit/Bed#: WE 2 N -01 I Date of Admission: 5/5/2025   Date of Service: 5/5/2025 I Hospital Day: 0   Consults  Physician Requesting Evaluation: Mynor Sarabia MD   Reason for Evaluation / Principal Problem: Medical management of the patient that is status post left TKA    Assessment & Plan  Primary osteoarthritis of left knee  Left TKA postoperative day 0, doing well, AVSS, appropriate urinary output, pain well-controlled, neurovascularly intact transferring and ambulating with assistance    POD #0 s/p left TKA-   - Antibiotics: Ancef every 8 hours IV for 2 more doses  - Anticoagulation: Ambulation, SCDs, Lovenox 40 mg subcutaneous once daily  - Activity: Weightbearing as tolerated, PT/OT   - AM lab draw: Pending CBC and BMP  - Analgesia: Continue p.r.n. medication  - Dressing: keep clean, dry, and in tact  - Continue Xanax and Lexapro  - Disposition: Pending progression with PT/OT hopeful for discharge      Hypertension  Stable, chronic    - Continue Toprol  PAC (premature atrial contraction)  Stable, chronic, rate controlled, well-documented    -Continue Toprol    Hospitalist service will follow.      VTE Pharmacologic Prophylaxis:   High Risk (Score >/= 5) - Pharmacological DVT Prophylaxis Ordered: enoxaparin (Lovenox). Sequential Compression Devices Ordered.  Code Status: Level 1 - Full Code     History of Present Illness   Iva Reid is a 70 y.o. female with a past medical history pertinent anxiety and hypertension presenting to the internal medicine service for medical management of patient status post left total knee arthroplasty.  Patient is doing quite well and is tolerating well.  Patient has a dull ache in her lower extremity is worsened with movement but otherwise seems well-controlled.  Patient's pain is well-controlled with as needed analgesic regiment.  Patient has been up to ambulate and transfer to  the bathroom and has gone to the bathroom already.  Patient has tolerated dinner without any nausea or vomiting.  Patient does not have any numbness, tingling, lack of motor movement, lack of sensation of the distal lower extremity. Patient has been weightbearing.  Patient historically has had PACs that have been worked up and documented by cardiologist but denies any chest pain, palpitations, racing heart, shortness of breath.  Patient had some questions regarding prosthesis both what it is composed of and the appearance of it.  Duration of longevity of how long the prosthesis would last.  Patient is quite pleasantly surprised with the orthopedic hospital and how well her care has been.  Patient does quite appreciative of all of the care.  Patient has not no other questions or concerns.  Please refer to review of systems    Review of Systems   Constitutional: Negative.    HENT: Negative.     Respiratory: Negative.     Cardiovascular: Negative.    Gastrointestinal: Negative.    Genitourinary: Negative.    Musculoskeletal:  Positive for arthralgias.   Skin: Negative.    Neurological: Negative.    Psychiatric/Behavioral: Negative.         Historical Information   Past Medical History:   Diagnosis Date    Anxiety     Hematuria     Hypertension     Irregular heart beat     Kidney stone May 2021     Past Surgical History:   Procedure Laterality Date    BILATERAL SALPINGOOPHORECTOMY      last assessed 8/11/14    BREAST BIOPSY Left     EXCISIONAL BIOPSY BENIGN    CHOLECYSTECTOMY  2009    COLONOSCOPY  10/31/2022    HYSTERECTOMY  2004    JOINT REPLACEMENT Right     knee    KNEE SURGERY      OOPHORECTOMY Bilateral 2004    ((Pt Qnr Sub: decrease kidney function))      Social History     Tobacco Use    Smoking status: Never    Smokeless tobacco: Never   Vaping Use    Vaping status: Never Used   Substance and Sexual Activity    Alcohol use: No    Drug use: No    Sexual activity: Not Currently     Partners: Male     Birth  control/protection: Female Sterilization     E-Cigarette/Vaping    E-Cigarette Use Never User      E-Cigarette/Vaping Substances    Nicotine No     THC No     CBD No     Flavoring No     Other No     Unknown No      Family history non-contributory      Meds/Allergies   I have reviewed home medications with patient personally.  Prior to Admission medications    Medication Sig Start Date End Date Taking? Authorizing Provider   acetaminophen (TYLENOL) 500 mg tablet Take 2 tablets (1,000 mg total) by mouth every 6 (six) hours as needed for mild pain 5/5/25  Yes Briana Joseph PA-C   ALPRAZolam (XANAX) 0.5 mg tablet Take by mouth as needed 1/6/15  Yes Historical Provider, MD   amLODIPine-benazepril (LOTREL 5-20) 5-20 MG per capsule  2/1/24  Yes Historical Provider, MD   ascorbic acid (VITAMIN C) 500 MG tablet Take 1 tablet (500 mg total) by mouth daily Start 30 days prior to surgery 2/4/25  Yes Mynor Sarabia MD   Cholecalciferol (Vitamin D3) 50 MCG (2000 UT) capsule    Yes Historical Provider, MD   cyclobenzaprine (FLEXERIL) 5 mg tablet TAKE 1 TO 2 TABLETS BY MOUTH TWICE DAILY AS NEEDED 4/3/25  Yes Historical Provider, MD   DHA-EPA-Flaxseed Oil-Vitamin E (THERA TEARS) CAPS Take by mouth   Yes Historical Provider, MD   escitalopram (LEXAPRO) 10 mg tablet Take 10 mg by mouth daily 4/11/22  Yes Historical Provider, MD   estradiol (ESTRACE) 0.1 mg/g vaginal cream Insert 1 g into the vagina 2 (two) times a week 3/6/25  Yes Sommer Zamarripa PA-C   ferrous sulfate 324 (65 Fe) mg Take 1 tablet (324 mg total) by mouth daily before breakfast Start 30 days prior to surgery 2/4/25  Yes Mynor Sarabia MD   folic acid (FOLVITE) 1 mg tablet Take 1 tablet (1 mg total) by mouth daily Start 30 days prior to surgery 2/4/25  Yes Mynor Sarabia MD   Glycerin, PF, (Optase Dry Eye Intense) 0.2 % SOLN  2/1/19  Yes Historical Provider, MD   meloxicam (MOBIC) 15 mg tablet  9/2/24  Yes Historical Provider, MD    methocarbamol (ROBAXIN) 500 mg tablet Take 1 tablet (500 mg total) by mouth 3 (three) times a day as needed for muscle spasms 5/5/25  Yes Briana Joseph PA-C   metoprolol succinate (TOPROL-XL) 25 mg 24 hr tablet TAKE 1 TABLET BY MOUTH EVERY DAY 12/31/24  Yes Jonathan Robert MD   multivitamin (THERAGRAN) TABS Take 1 tablet by mouth daily   Yes Historical Provider, MD   mupirocin (BACTROBAN) 2 % ointment Apply topically 2 (two) times a day Apply pea size amount to each nostril 2x/day for 5 days prior to procedure 2/4/25  Yes Mynor Sarabia MD   nystatin-triamcinolone (MYCOLOG-II) cream Apply topically 2 (two) times a day 11/17/21  Yes Sommer Zamarripa PA-C   oxyCODONE (Roxicodone) 5 immediate release tablet Take 1 tablet (5 mg total) by mouth every 6 (six) hours as needed for moderate pain for up to 10 days Max Daily Amount: 20 mg 5/5/25 5/15/25 Yes Briana Joseph PA-C   Carboxymethylcellul-Glycerin (Refresh Optive) 1-0.9 % GEL  6/1/20   Historical Provider, MD   cephalexin (KEFLEX) 500 mg capsule TAKE 4 CAPS 1 HOUR PRIOR TO DENTAL APPT 3/21/24   Historical Provider, MD   enoxaparin (LOVENOX) 40 mg/0.4 mL Inject 0.4 mL (40 mg total) under the skin daily for 28 days Start injections after surgery 2/4/25 3/4/25  Mynor Sarabia MD     No Known Allergies    Objective :  Temp:  [97.3 °F (36.3 °C)-97.7 °F (36.5 °C)] 97.3 °F (36.3 °C)  HR:  [53-64] 60  BP: (104-177)/(55-91) 118/57  Resp:  [12-19] 16  SpO2:  [94 %-100 %] 95 %  O2 Device: None (Room air)  Nasal Cannula O2 Flow Rate (L/min):  [2 L/min] 2 L/min    Physical Exam  Constitutional:       General: She is awake. She is not in acute distress.     Appearance: Normal appearance. She is normal weight. She is not ill-appearing, toxic-appearing or diaphoretic.      Interventions: She is not intubated.  HENT:      Head: Normocephalic and atraumatic.      Right Ear: Hearing and external ear normal.      Left Ear: Hearing and external ear normal.      Nose:  Nose normal.   Cardiovascular:      Rate and Rhythm: Normal rate. Rhythm irregular.      Pulses:           Dorsalis pedis pulses are 2+ on the right side and 2+ on the left side.      Heart sounds: Normal heart sounds, S1 normal and S2 normal. Heart sounds not distant. No murmur heard.  Pulmonary:      Effort: Pulmonary effort is normal. No tachypnea, bradypnea, accessory muscle usage, prolonged expiration, respiratory distress or retractions. She is not intubated.      Breath sounds: Normal breath sounds. No stridor, decreased air movement or transmitted upper airway sounds. No decreased breath sounds, wheezing, rhonchi or rales.   Skin:     General: Skin is warm and dry.      Capillary Refill: Capillary refill takes less than 2 seconds.   Psychiatric:         Behavior: Behavior is cooperative.      Left Lower Extremity: Thigh and calf compartments are soft and nontender to palpation. + L3-S1 SILT. + DF/PF.+ EHL. No pain with passive stretch/extension of toes. DP 2+, capillary refill less than 2 seconds, and foot is warm B/L. Incision is c/d/i        Lines/Drains:  Lines/Drains/Airways       Active Status       Name Placement date Placement time Site Days    Closed/Suction Drain Anterior;Left Knee Accordion 10 Fr. 05/05/25  1220  Knee  less than 1                          Lab Results: I have reviewed the following results:                  Lab Results   Component Value Date    HGBA1C 5.8 (H) 04/07/2025           Imaging Results Review: No pertinent imaging studies reviewed.  Other Study Results Review: No additional pertinent studies reviewed.    Administrative Statements   I have spent a total time of 35 minutes in caring for this patient on the day of the visit/encounter including Risks and benefits of tx options, Instructions for management, Patient and family education, Impressions, Counseling / Coordination of care, Documenting in the medical record, Obtaining or reviewing history  , and Communicating with  other healthcare professionals .    ** Please Note: This note has been constructed using a voice recognition system. **

## 2025-05-06 NOTE — PLAN OF CARE
Problem: PAIN - ADULT  Goal: Verbalizes/displays adequate comfort level or baseline comfort level  Description: Interventions:- Encourage patient to monitor pain and request assistance- Assess pain using appropriate pain scale- Administer analgesics based on type and severity of pain and evaluate response- Implement non-pharmacological measures as appropriate and evaluate response- Consider cultural and social influences on pain and pain management- Notify physician/advanced practitioner if interventions unsuccessful or patient reports new pain  Outcome: Progressing     Problem: INFECTION - ADULT  Goal: Absence or prevention of progression during hospitalization  Description: INTERVENTIONS:- Assess and monitor for signs and symptoms of infection- Monitor lab/diagnostic results- Monitor all insertion sites, i.e. indwelling lines, tubes, and drains- Monitor endotracheal if appropriate and nasal secretions for changes in amount and color- [place] appropriate cooling/warming therapies per order- Administer medications as ordered- Instruct and encourage patient and family to use good hand hygiene technique- Identify and instruct in appropriate isolation precautions for identified infection/condition  Outcome: Progressing  Goal: Absence of fever/infection during neutropenic period  Description: INTERVENTIONS:- Monitor WBC  Outcome: Progressing     Problem: SAFETY ADULT  Goal: Patient will remain free of falls  Description: INTERVENTIONS:- Educate patient/family on patient safety including physical limitations- Instruct patient to call for assistance with activity - Consult OT/PT to assist with strengthening/mobility - Keep Call bell within reach- Keep bed low and locked with side rails adjusted as appropriate- Keep care items and personal belongings within reach- Initiate and maintain comfort rounds- Make Fall Risk Sign visible to staff- Offer Toileting every 2 Hours, in advance of need- Initiate/Maintain bed/chair  alarm- Obtain necessary fall risk management equipment: walker - Apply yellow socks and bracelet for high fall risk patients- Consider moving patient to room near nurses station  Outcome: Progressing  Goal: Maintain or return to baseline ADL function  Description: INTERVENTIONS:-  Assess patient's ability to carry out ADLs; assess patient's baseline for ADL function and identify physical deficits which impact ability to perform ADLs (bathing, care of mouth/teeth, toileting, grooming, dressing, etc.)- Assess/evaluate cause of self-care deficits - Assess range of motion- Assess patient's mobility; develop plan if impaired- Assess patient's need for assistive devices and provide as appropriate- Encourage maximum independence but intervene and supervise when necessary- Involve family in performance of ADLs- Assess for home care needs following discharge - Consider OT consult to assist with ADL evaluation and planning for discharge- Provide patient education as appropriate  Outcome: Progressing  Goal: Maintains/Returns to pre admission functional level  Description: INTERVENTIONS:- Perform AM-PAC 6 Click Basic Mobility/ Daily Activity assessment daily.- Set and communicate daily mobility goal to care team and patient/family/caregiver. - Collaborate with rehabilitation services on mobility goals if consulted- Perform Range of Motion 4 times a day.- Reposition patient every 2 hours.- Dangle patient 3 times a day- Stand patient 3 times a day- Ambulate patient 3 times a day- Out of bed to chair 3 times a day - Out of bed for meals 3 times a day- Out of bed for toileting- Record patient progress and toleration of activity level   Outcome: Progressing     Problem: DISCHARGE PLANNING  Goal: Discharge to home or other facility with appropriate resources  Description: INTERVENTIONS:- Identify barriers to discharge w/patient and caregiver- Arrange for needed discharge resources and transportation as appropriate- Identify discharge  learning needs (meds, wound care, etc.)- Arrange for interpretive services to assist at discharge as needed- Refer to Case Management Department for coordinating discharge planning if the patient needs post-hospital services based on physician/advanced practitioner order or complex needs related to functional status, cognitive ability, or social support system  Outcome: Progressing     Problem: Knowledge Deficit  Goal: Patient/family/caregiver demonstrates understanding of disease process, treatment plan, medications, and discharge instructions  Description: Complete learning assessment and assess knowledge base.Interventions:- Provide teaching at level of understanding- Provide teaching via preferred learning methods  Outcome: Progressing

## 2025-05-06 NOTE — PLAN OF CARE
Problem: OCCUPATIONAL THERAPY ADULT  Goal: Performs self-care activities at highest level of function for planned discharge setting.  See evaluation for individualized goals.  Description: Treatment Interventions: ADL retraining, Functional transfer training, Endurance training, Patient/family training, Equipment evaluation/education, Compensatory technique education, Continued evaluation, Energy conservation, Activityengagement          See flowsheet documentation for full assessment, interventions and recommendations.   Outcome: Adequate for Discharge  Note: Limitation: Decreased ADL status, Decreased endurance, Decreased self-care trans, Decreased high-level ADLs  Prognosis: Good  Assessment: Pt seen for OT treatment session focusing on ADLs/IADLs, functional mobility, functional standing tolerance, functional transfers, patient education, continued evaluation. Pt greeted on toilet at start of session. Pt alert and cooperative throughout session. Pt with good sitting balance and fair - dynamic standing balance. Pt tolerated treatment well. Pt completed toileting on BS over standard toilet with S and use of armrests. Pt completed perineal hygiene seated and standing with verbal cues for proper technique. Educated on proper technique when completing BM in future. Pt completed functional mobility to sink with use of RW and S. Pt completed grooming at sink with use of RW and S. Pt completed functional mobility functional distance with use of RW and S. Pt completed don of underwear and pants seated in chair with S then standing to pull over waist with use of RW for stability. Pt completed don of bra and shirt seated in chair with S. Pt educated on don of socks and shoes with assistance from spouse. Pt educated on proper icing and LE management for independence and safety at home. Pt educated on all ADLs/IADLs, transfers and LE management for independence at  home. Pt educated on sponge bathing in bathroom during  first few days prior to getting cleared to fully shower. Pt reports 3/10 pain and no dizziness. Pt's vitals include: stable.     Pt ended session seated OOB in recliner. Call bell and phone within reach. All needs met and pt reports no further questions at this time. Continue to recommend No post-acute rehabilitation needs when medically cleared. OT will continue to follow pt on caseload.     Rehab Resource Intensity Level, OT: No post-acute rehabilitation needs

## 2025-05-06 NOTE — PLAN OF CARE
Problem: PAIN - ADULT  Goal: Verbalizes/displays adequate comfort level or baseline comfort level  Description: Interventions:- Encourage patient to monitor pain and request assistance- Assess pain using appropriate pain scale- Administer analgesics based on type and severity of pain and evaluate response- Implement non-pharmacological measures as appropriate and evaluate response- Consider cultural and social influences on pain and pain management- Notify physician/advanced practitioner if interventions unsuccessful or patient reports new pain  Outcome: Progressing     Problem: INFECTION - ADULT  Goal: Absence or prevention of progression during hospitalization  Description: INTERVENTIONS:- Assess and monitor for signs and symptoms of infection- Monitor lab/diagnostic results- Monitor all insertion sites, i.e. indwelling lines, tubes, and drains- Monitor endotracheal if appropriate and nasal secretions for changes in amount and color- [place] appropriate cooling/warming therapies per order- Administer medications as ordered- Instruct and encourage patient and family to use good hand hygiene technique- Identify and instruct in appropriate isolation precautions for identified infection/condition  Outcome: Progressing  Goal: Absence of fever/infection during neutropenic period  Description: INTERVENTIONS:- Monitor WBC  Outcome: Progressing     Problem: SAFETY ADULT  Goal: Patient will remain free of falls  Description: INTERVENTIONS:- Educate patient/family on patient safety including physical limitations- Instruct patient to call for assistance with activity - Consult OT/PT to assist with strengthening/mobility - Keep Call bell within reach- Keep bed low and locked with side rails adjusted as appropriate- Keep care items and personal belongings within reach- Initiate and maintain comfort rounds- Make Fall Risk Sign visible to staff- Offer Toileting every 2 Hours, in advance of need- Initiate/Maintain bed/chair  alarm- Obtain necessary fall risk management equipment: walker - Apply yellow socks and bracelet for high fall risk patients- Consider moving patient to room near nurses station  Outcome: Progressing  Goal: Maintain or return to baseline ADL function  Description: INTERVENTIONS:-  Assess patient's ability to carry out ADLs; assess patient's baseline for ADL function and identify physical deficits which impact ability to perform ADLs (bathing, care of mouth/teeth, toileting, grooming, dressing, etc.)- Assess/evaluate cause of self-care deficits - Assess range of motion- Assess patient's mobility; develop plan if impaired- Assess patient's need for assistive devices and provide as appropriate- Encourage maximum independence but intervene and supervise when necessary- Involve family in performance of ADLs- Assess for home care needs following discharge - Consider OT consult to assist with ADL evaluation and planning for discharge- Provide patient education as appropriate  Outcome: Progressing  Goal: Maintains/Returns to pre admission functional level  Description: INTERVENTIONS:- Perform AM-PAC 6 Click Basic Mobility/ Daily Activity assessment daily.- Set and communicate daily mobility goal to care team and patient/family/caregiver. - Collaborate with rehabilitation services on mobility goals if consulted- Perform Range of Motion 4 times a day.- Reposition patient every 2 hours.- Dangle patient 3 times a day- Stand patient 3 times a day- Ambulate patient 3 times a day- Out of bed to chair 3 times a day - Out of bed for meals 3 times a day- Out of bed for toileting- Record patient progress and toleration of activity level   Outcome: Progressing     Problem: DISCHARGE PLANNING  Goal: Discharge to home or other facility with appropriate resources  Description: INTERVENTIONS:- Identify barriers to discharge w/patient and caregiver- Arrange for needed discharge resources and transportation as appropriate- Identify discharge  learning needs (meds, wound care, etc.)- Arrange for interpretive services to assist at discharge as needed- Refer to Case Management Department for coordinating discharge planning if the patient needs post-hospital services based on physician/advanced practitioner order or complex needs related to functional status, cognitive ability, or social support system  Outcome: Progressing     Problem: Knowledge Deficit  Goal: Patient/family/caregiver demonstrates understanding of disease process, treatment plan, medications, and discharge instructions  Description: Complete learning assessment and assess knowledge base.Interventions:- Provide teaching at level of understanding- Provide teaching via preferred learning methods  Outcome: Progressing

## 2025-05-06 NOTE — PROGRESS NOTES
Progress Note - Orthopedics   Name: Iva Reid 70 y.o. female I MRN: [id number]  Unit/Bed#: WE 2 N -01 I Date of Admission: 5/5/2025   Date of Service: 5/6/2025 I Hospital Day: 0     Assessment & Plan  Primary osteoarthritis of left knee  POD 1 s/p left total knee arthroplasty  Weightbearing as tolerated left lower extremity  PT/OT for ambulation training  DVT prophylaxis with Lovenox  Analgesics as needed for pain  Hemovac drain was removed today without complication, 125 cc total output since surgery  Discharge planning  Hypertension  Continue care as per medical team  PAC (premature atrial contraction)  Continue care as per medical team  Dyslipidemia  Continue care as per medical team      Subjective   70 y.o.female POD 1 s/p left total knee arthroplasty no acute events, no new complaints. Pain well controlled. Denies fevers, chills, CP, SOB, N/V, numbness or tingling. Patient reports no issues with urination or bowel movements.     Objective :  Temp:  [97.3 °F (36.3 °C)-97.7 °F (36.5 °C)] 97.3 °F (36.3 °C)  HR:  [53-64] 58  BP: (104-177)/(55-91) 156/75  Resp:  [12-19] 16  SpO2:  [94 %-100 %] 95 %  O2 Device: None (Room air)  Nasal Cannula O2 Flow Rate (L/min):  [2 L/min] 2 L/min    Physical Exam  Musculoskeletal: Left lower extremity  Dressing clean, dry and intact  Hemovac drain was removed today without complication, 125 cc total output since surgery  Tenderness to palpation diffusely over the anterior, medial and lateral aspect of the knee  Range of motion 0 to 90 degrees with some discomfort  Motor intact to +FHL/EHL, +ankle dorsi/plantar flexion  Sensation intact to saphenous, sural, tibial, superficial peroneal nerve, and deep peroneal  2+ DP pulse  No calf swelling or tenderness to palpation      Lab Results: I have reviewed the following results:  Recent Labs     05/06/25  0613   WBC 12.95*   HGB 12.1   HCT 37.2      BUN 10   CREATININE 0.58*     Blood Culture:  No results found for:  "\"BLOODCX\"  Wound Culture: No results found for: \"WOUNDCULT\"    "

## 2025-05-06 NOTE — ASSESSMENT & PLAN NOTE
Left TKA postoperative day 1, doing well, AVSS, appropriate urinary output, pain well-controlled, neurovascularly intact transferring and ambulating with assistance    POD #1 s/p left TKA-   - Antibiotics: completed  - Anticoagulation: Ambulation, SCDs, Lovenox 40 mg subcutaneous once daily  - Activity: Weightbearing as tolerated, PT/OT   - AM lab draw: Pending CBC and BMP  - Analgesia: Continue p.r.n. medication  - Dressing: keep clean, dry, and in tact  - Continue Xanax and Lexapro  - Disposition: Pending progression with PT/OT hopeful for discharge

## 2025-05-06 NOTE — PROGRESS NOTES
Progress Note - Hospitalist   Name: Iva Reid 70 y.o. female I MRN: [id number]  Unit/Bed#: WE 2 N -01 I Date of Admission: 5/5/2025   Date of Service: 5/6/2025 I Hospital Day: 0     Assessment & Plan  Primary osteoarthritis of left knee  Left TKA postoperative day 1, doing well, AVSS, appropriate urinary output, pain well-controlled, neurovascularly intact transferring and ambulating with assistance    POD #1 s/p left TKA-   - Antibiotics: completed  - Anticoagulation: Ambulation, SCDs, Lovenox 40 mg subcutaneous once daily  - Activity: Weightbearing as tolerated, PT/OT   - AM lab draw: Pending CBC and BMP  - Analgesia: Continue p.r.n. medication  - Dressing: keep clean, dry, and in tact  - Continue Xanax and Lexapro  - Disposition: Pending progression with PT/OT hopeful for discharge      Hypertension  Stable, chronic    - Continue Toprol  PAC (premature atrial contraction)  Stable, chronic, rate controlled, well-documented    -Continue Toprol      Hospitalist service will follow.    Subjective   Patient was seen and examined this morning.  Patient has no acute issues.  Patient neurovascular is intact.  Patient was somewhat uncomfortable at night but did respond decently well to pain medications.  Patient was woken up a couple times throughout the night and did not sleep well.  Patient is somewhat surprised at how sore she still feels at times.  Patient did have some tightness from the compressive Ace bandage wrap.  Patient has no other questions or concerns.    Objective :  Temp:  [97.3 °F (36.3 °C)-97.7 °F (36.5 °C)] 97.3 °F (36.3 °C)  HR:  [53-64] 58  BP: (104-177)/(55-91) 156/75  Resp:  [12-19] 16  SpO2:  [94 %-100 %] 95 %  O2 Device: None (Room air)  Nasal Cannula O2 Flow Rate (L/min):  [2 L/min] 2 L/min    I/O         05/04 0701  05/05 0700 05/05 0701  05/06 0700    I.V. (mL/kg)  2050 (28.6)    IV Piggyback  150    Total Intake(mL/kg)  2200 (30.7)    Urine (mL/kg/hr)  950    Drains  125    Blood   75    Total Output  1150    Net  +1050                Lines/Drains/Airways       Active Status       Name Placement date Placement time Site Days    Closed/Suction Drain Anterior;Left Knee Accordion 10 Fr. 05/05/25  1220  Knee  less than 1                  Physical Exam  Constitutional:       General: She is awake.      Appearance: Normal appearance. She is normal weight. She is not ill-appearing, toxic-appearing or diaphoretic.   Cardiovascular:      Rate and Rhythm: Normal rate.   Pulmonary:      Effort: Pulmonary effort is normal.   Psychiatric:         Behavior: Behavior is cooperative.     left Lower Extremity: Thigh and calf compartments are soft and nontender to palpation. + L3-S1 SILT. + DF/PF.+ EHL. No pain with passive stretch/extension of toes. foot is warm B/L. Incision is c/d/i          Lab Results: I have reviewed the following results:  Recent Labs     05/06/25  0613   WBC 12.95*   HGB 12.1   HCT 37.2          Imaging Results Review: No pertinent imaging studies reviewed.  Other Study Results Review: No additional pertinent studies reviewed.    VTE Pharmacologic Prophylaxis: Enoxaparin (Lovenox)  VTE Mechanical Prophylaxis: sequential compression device

## 2025-05-06 NOTE — PLAN OF CARE
Problem: PHYSICAL THERAPY ADULT  Goal: Performs mobility at highest level of function for planned discharge setting.  See evaluation for individualized goals.  Description: Treatment/Interventions: Functional transfer training, LE strengthening/ROM, Elevations, Therapeutic exercise, Endurance training, Patient/family training, Bed mobility, Gait training, Spoke to nursing, OT  Equipment Recommended: Walker (pt owns)       See flowsheet documentation for full assessment, interventions and recommendations.  5/6/2025 1516 by Mayelin Bowen PT  Outcome: Adequate for Discharge  Note: Prognosis: Good  Problem List: Decreased strength, Decreased range of motion, Decreased endurance, Impaired balance, Decreased mobility, Decreased safety awareness, Pain  Assessment: Patient was seen today per POC. Overall, pt demonstrated improved mobility and inc tolerance to activity. Pain 7/10 in L knee. Required S for sit<>stand, tub transfer, ambulation, stair navigation, and toilet transfer. Pt able to ambulate 60'x2 with RW and S in unit. Antalgic step to  gait with no gross LOB noted. Able ot perform nonreciprocal stair navigation with unilateral hand rail and SPC and bilateral hand rails to simulate multiple home setup ups. Use of RW for landing stair navigation with continued nonreciprocal gait. Reviewed HEP and provided handout. All questions and concerns addressed at this time. No reports of dizziness t/o session. Will continue to see pt per POC as tolerated. From PT standpoint continued recommendation for level III, (minimum resource intensity) at D/C when medically cleared based current function. The patient's AM-PAC Basic Mobility Inpatient Short Form Raw Score is 23. A Raw score of greater than 16 suggests the patient may benefit from discharge to home. Please also refer to the recommendation of the Physical Therapist for safe discharge planning. [facility] staff to continue to mobilized pt (OOB in chair for all meals &  ambulate in room/unit) as tolerated to prevent further decline in function. Nsg staff notified. From PT stand point, pt cleared functionally for D/C when medically appropriate.  Barriers to Discharge: None  Barriers to Discharge Comments: From PT stand point, pt cleared functionally for D/C when medically appropriate.  Rehab Resource Intensity Level, PT: III (Minimum Resource Intensity)    See flowsheet documentation for full assessment.

## 2025-05-06 NOTE — ASSESSMENT & PLAN NOTE
Left TKA postoperative day 0, doing well, AVSS, appropriate urinary output, pain well-controlled, neurovascularly intact transferring and ambulating with assistance    POD #0 s/p left TKA-   - Antibiotics: Ancef every 8 hours IV for 2 more doses  - Anticoagulation: Ambulation, SCDs, Lovenox 40 mg subcutaneous once daily  - Activity: Weightbearing as tolerated, PT/OT   - AM lab draw: Pending CBC and BMP  - Analgesia: Continue p.r.n. medication  - Dressing: keep clean, dry, and in tact  - Continue Xanax and Lexapro  - Disposition: Pending progression with PT/OT hopeful for discharge

## 2025-05-07 ENCOUNTER — TELEPHONE (OUTPATIENT)
Dept: OBGYN CLINIC | Facility: HOSPITAL | Age: 70
End: 2025-05-07

## 2025-05-07 NOTE — TELEPHONE ENCOUNTER
Patient contacted for a postoperative follow up assessment. Patient states current pain level of a 4/10 when sitting and 9/10 when walking with RW.  Patient denies increase in swelling and dressing is Dressing C/D/I Patient isicing the site regularly. NN educated patient on post-op bruising, swelling, and icing. PT 5/8 at 12PM.      We reviewed patients AVS medication list. Patient is taking Tylenol 1000mg every 8 hours, Oxycodone 5mg every 6 hours, Lovenox injections daily, Methocarbamol 500mg TID, and OTC stool softener. Patient has had a BM but is passing gas.       Patient denies nausea, vomiting, abdominal pain, chest pain, shortness of breath, fever, dizziness, and calf pain. Patient confirmed post-op appointment with surgeon on 5/13 at 10:15AM .Patient does not have any other questions or concerns at this time. Pt was encouraged to call with any questions, concerns or issues.

## 2025-05-07 NOTE — PROGRESS NOTES
"Daily Note     Today's date: 2025  Patient name: Iva Reid  : 1955  MRN: [id number]  Referring provider: Mynor Sarabia,*  Dx:   Encounter Diagnosis     ICD-10-CM    1. S/P total knee arthroplasty, left  Z96.652           Start Time: 1200  Stop Time: 1245  Total time in clinic (min): 45 minutes    Subjective: Patient presents s/p 3 days L TKA. Ambulating fair with RW. Pain level is approx 8-9/10.       Objective: See treatment diary below    Pertinent Findings:                                                                                             Test / Measure  25  Pre-Op Test 25  Post-Op Test   FOTO (Predicted )     L Knee AROM Ext:   A: -25   P: -20 P!    Flex:  A: 120 P!  P: 125 Ext:  A: -30      Flex:   A: 65 P!   L Knee MMT    5xSTS  17.80\"  No UE support  No Foam assist Defer to next RE   TUG With SPC     19.53\"      No SPC      18.45\" Defer to next RE       Assessment: Patient tolerated treatment session well today s/p 3 days L TKA with focus on introduction to gentle mobility and quad neuromuscular control. Patient was limited as anticipated due to edema and tissue irritability. We reviewed Total Knee Post-Op exercises, positioning, and modalities such as ice. Patient demonstrated improve knee mobility post-manuals and repetitions. Patient will continue to be appropriate for skilled outpatient physical therapy in order to address impairments and maximize function s/p L TKA. 1:1 with Yolanda Cheema, PT, DPT for entirety of treatment session.        Plan: Continue per plan of care.      Precautions: L TKA 25, HTN, Kidney     POC expires Unit limit Auth Expiration date PT/OT + Visit Limit?   25 bomn 25 bomn         Visit/Unit Tracking  AUTH Status:  Date        10 visits every RE Used 1 2        Remaining  9 8               Access Code: [email or web address]  URL: [email or web address]  Date: 2025  Prepared by: Yolanda Cheema    Exercises  - " "Supine Gluteal Sets  - 1-4 x daily - 7 x weekly - 1 sets - 10 reps - 10 second hold  - Supine Heel Slide  - 1-4 x daily - 7 x weekly - 1 sets - 10 reps - 5-10 second hold  - Supine Quad Set  - 1-4 x daily - 7 x weekly - 1-2 sets - 10 reps - 5-10 second hold  - Supine Hip Abduction  - 1-4 x daily - 7 x weekly - 3 sets - 10 reps  - Supine Active Straight Leg Raise  - 1-3 x daily - 7 x weekly - 3 sets - 10 reps - 2 second hold  - Supine Bridge  - 1 x daily - 7 x weekly - 3 sets - 5-8 reps  - Seated Knee Extension AAROM  - 1-4 x daily - 7 x weekly - 3 sets - 10 reps  - Seated Knee Flexion Extension AAROM with Overpressure  - 1-4 x daily - 7 x weekly - 3 sets - 10 reps  - Seated Knee Extension Stretch with Chair  - 1-3 x daily - 7 x weekly - 1 sets - 1 reps - 5 minutes hold  - Seated Calf Stretch with Strap  - 1-3 x daily - 7 x weekly - 1 sets - 10 reps - 10 second hold  - Seated Ankle Pumps  - 1-4 x daily - 7 x weekly - 3 sets - 10 reps    Patient Education  - Pain Management  - TKR: How Early Mobility Aids Recovery   - TKR: Surgery Overview  - TKR: Your Nerves and Your Knee--Pain Neuroscience Education for Knee Replacement    Manuals 4/28  5/8         L Knee PROM   AR         L Patella Mobilization   AR         L Tibiofemoral Posterior Mob                         Neuro Re-Ed            Quadset   to pillow  5\"x20         SAQ on bolster        SLR   + strap  PT Ax1  2x5         Single Leg Balance            Eccentric Step Downs                          Ther Ex             HEP/Patient Education AR performed  AR performed/reviewed         NuStep or RB    NV NS         Heel Slides with Strap    5\"x10         HS Stretch with Strap        Bridge        Standing Hip Abduction              Standing Heel Raises/Toe Raises                                                                     Ther Activity              Fwd Step-Ups              Lat Step-Ups             Gait Training                                         Modalities "

## 2025-05-08 ENCOUNTER — OFFICE VISIT (OUTPATIENT)
Dept: PHYSICAL THERAPY | Facility: REHABILITATION | Age: 70
End: 2025-05-08
Payer: MEDICARE

## 2025-05-08 DIAGNOSIS — Z96.652 S/P TOTAL KNEE ARTHROPLASTY, LEFT: Primary | ICD-10-CM

## 2025-05-08 PROCEDURE — 97140 MANUAL THERAPY 1/> REGIONS: CPT

## 2025-05-08 PROCEDURE — 97112 NEUROMUSCULAR REEDUCATION: CPT

## 2025-05-12 ENCOUNTER — OFFICE VISIT (OUTPATIENT)
Dept: PHYSICAL THERAPY | Facility: REHABILITATION | Age: 70
End: 2025-05-12
Payer: MEDICARE

## 2025-05-12 DIAGNOSIS — Z96.652 S/P TOTAL KNEE ARTHROPLASTY, LEFT: Primary | ICD-10-CM

## 2025-05-12 PROCEDURE — 97110 THERAPEUTIC EXERCISES: CPT

## 2025-05-12 PROCEDURE — 97140 MANUAL THERAPY 1/> REGIONS: CPT

## 2025-05-12 PROCEDURE — 97112 NEUROMUSCULAR REEDUCATION: CPT

## 2025-05-12 NOTE — PROGRESS NOTES
"Daily Note     Today's date: 2025  Patient name: Iav Reid  : 1955  MRN: [id number]  Referring provider: Mynor Sarabia,*  Dx:   Encounter Diagnosis     ICD-10-CM    1. S/P total knee arthroplasty, left  Z96.652           Start Time: 1105  Stop Time: 1150  Total time in clinic (min): 45 minutes    Subjective: Patient still is doing well with RW. Pain is controlled via medication. Is ambulating stairs.      Objective: See treatment diary below    Assessment: Patient tolerated treatment session well with continue focus on gentle progressive mobility and quad neuromuscular control. Patient demonstrated improve knee mobility post-manuals and repetitions. Was able to perform exercises with less PT assist. Patient will continue to be appropriate for skilled outpatient physical therapy in order to address impairments and maximize function s/p L TKA. 1:1 with Yolanda Cheema, PT, DPT for entirety of treatment session.        Plan: Continue per plan of care.      Precautions: L TKA 25, HTN, Kidney     POC expires Unit limit Auth Expiration date PT/OT + Visit Limit?   25 bomn 25 bomn         Visit/Unit Tracking  AUTH Status:  Date       10 visits every RE Used 1 2 3       Remaining  9 8 7         Pertinent Findings:                                                                                             Test / Measure  25  Pre-Op Test 25  Post-Op Test   FOTO (Predicted )     L Knee AROM Ext:   A: -25   P: -20 P!    Flex:  A: 120 P!  P: 125 Ext:  A: -30      Flex:   A: 65 P!   L Knee MMT    5xSTS  17.80\"  No UE support  No Foam assist Defer to next RE   TUG With SPC     19.53\"      No SPC      18.45\" Defer to next RE        Access Code: [email or web address]  URL: [email or web address]  Date: 2025  Prepared by: Yolanda Cheema    Exercises  - Supine Gluteal Sets  - 1-4 x daily - 7 x weekly - 1 sets - 10 reps - 10 second hold  - Supine Heel Slide  - 1-4 x " "daily - 7 x weekly - 1 sets - 10 reps - 5-10 second hold  - Supine Quad Set  - 1-4 x daily - 7 x weekly - 1-2 sets - 10 reps - 5-10 second hold  - Supine Hip Abduction  - 1-4 x daily - 7 x weekly - 3 sets - 10 reps  - Supine Active Straight Leg Raise  - 1-3 x daily - 7 x weekly - 3 sets - 10 reps - 2 second hold  - Supine Bridge  - 1 x daily - 7 x weekly - 3 sets - 5-8 reps  - Seated Knee Extension AAROM  - 1-4 x daily - 7 x weekly - 3 sets - 10 reps  - Seated Knee Flexion Extension AAROM with Overpressure  - 1-4 x daily - 7 x weekly - 3 sets - 10 reps  - Seated Knee Extension Stretch with Chair  - 1-3 x daily - 7 x weekly - 1 sets - 1 reps - 5 minutes hold  - Seated Calf Stretch with Strap  - 1-3 x daily - 7 x weekly - 1 sets - 10 reps - 10 second hold  - Seated Ankle Pumps  - 1-4 x daily - 7 x weekly - 3 sets - 10 reps    Patient Education  - Pain Management  - TKR: How Early Mobility Aids Recovery   - TKR: Surgery Overview  - TKR: Your Nerves and Your Knee--Pain Neuroscience Education for Knee Replacement    Manuals 4/28 5/8  5/12       L Knee PROM   AR  AR       L Patella Mobilization   AR  AR       L Tibiofemoral Posterior Mob     AR                    Neuro Re-Ed            Quadset   to pillow  5\"x20  to pillow  5\"x20       SAQ on bolster   15x     SLR   + strap  PT Ax1  2x5  +strap  2x5       Single Leg Balance            Eccentric Step Downs                          Ther Ex             HEP/Patient Education AR performed  AR performed/reviewed         NuStep or RB    NV NS  NS   8' L5       Heel Slides with Strap    5\"x10 5\"x10       LAQ   2x10     HS Stretch with Strap        Bridge        Standing Hip Abduction              Standing Heel Raises/Toe Raises                                                                     Ther Activity              Fwd Step-Ups              Lat Step-Ups             Gait Training                                         Modalities                                          "

## 2025-05-13 ENCOUNTER — HOSPITAL ENCOUNTER (OUTPATIENT)
Dept: RADIOLOGY | Facility: HOSPITAL | Age: 70
Discharge: HOME | End: 2025-05-13
Attending: ORTHOPAEDIC SURGERY
Payer: MEDICARE

## 2025-05-13 ENCOUNTER — OFFICE VISIT (OUTPATIENT)
Dept: OBGYN CLINIC | Facility: HOSPITAL | Age: 70
End: 2025-05-13

## 2025-05-13 VITALS — HEIGHT: 61 IN | BODY MASS INDEX: 29.85 KG/M2

## 2025-05-13 DIAGNOSIS — Z47.1 AFTERCARE FOLLOWING LEFT KNEE JOINT REPLACEMENT SURGERY: ICD-10-CM

## 2025-05-13 DIAGNOSIS — Z96.652 AFTERCARE FOLLOWING LEFT KNEE JOINT REPLACEMENT SURGERY: ICD-10-CM

## 2025-05-13 DIAGNOSIS — M17.12 PRIMARY OSTEOARTHRITIS OF LEFT KNEE: ICD-10-CM

## 2025-05-13 DIAGNOSIS — Z47.1 AFTERCARE FOLLOWING LEFT KNEE JOINT REPLACEMENT SURGERY: Primary | ICD-10-CM

## 2025-05-13 DIAGNOSIS — Z96.652 AFTERCARE FOLLOWING LEFT KNEE JOINT REPLACEMENT SURGERY: Primary | ICD-10-CM

## 2025-05-13 PROCEDURE — 73560 X-RAY EXAM OF KNEE 1 OR 2: CPT

## 2025-05-13 PROCEDURE — 99024 POSTOP FOLLOW-UP VISIT: CPT | Performed by: ORTHOPAEDIC SURGERY

## 2025-05-13 RX ORDER — OXYCODONE HYDROCHLORIDE 5 MG/1
5 TABLET ORAL EVERY 6 HOURS PRN
Qty: 30 TABLET | Refills: 0 | Status: SHIPPED | OUTPATIENT
Start: 2025-05-13 | End: 2025-05-23

## 2025-05-13 NOTE — TELEPHONE ENCOUNTER
1 [id number] 05/06/2025 05/06/2025 05/06/2025 oxyCODONE HCL (Tablet) 30.0 7 5 MG 32.14 SCOTT LAZO [facility] Medicare 0 / 0 [place]

## 2025-05-13 NOTE — TELEPHONE ENCOUNTER
Patient states provider was supposed to send to the pharmacy at visit this morning          Reason for call:   [x] Refill   [] Prior Auth  [] Other:     Office:   [] PCP/Provider -   [x] Specialty/Provider - Ortho    Medication: oxycodone 5 mg, take 1 tablet by mouth every 6 hours as needed       [facility]   Does the patient have enough for 3 days?   [x] Yes   [] No - Send as HP to POD    Mail Away Pharmacy   Does the patient have enough for 10 days?   [] Yes   [] No - Send as HP to POD

## 2025-05-13 NOTE — PROGRESS NOTES
Name: Iva Reid      : 1955       MRN: [id number]   Encounter Provider: Mynor Sarabia MD   Encounter Date: 25  Encounter [facility]     ASSESSMENT & PLAN:  Assessment & Plan  Aftercare following left knee joint replacement surgery  Continue physical therapy   Continue weight bearing activities as tolerated   Continue pain medications as needed   Oxycodone refill sent to pharmacy today  Continue lovenox as prescribed    Able to shower, letting warm soapy water run over incision and only pat dry   Follow up in 1 week for 2 week post-op appointment and removal of staples              To do next visit:  Return in about 1 week (around 2025) for 2 week post-op appointment with removal of staples.    _____________________________________________________  CHIEF COMPLAINT:  Chief Complaint   Patient presents with   • Left Knee - Post-op         SUBJECTIVE:  Iva Reid is a 70 y.o. female who presents 8 days s/p left total knee arthroplasty.  She is doing well.  She admits to continued pain of the left knee which she has been managing well with Tylenol, Robaxin, and Oxycodone as needed.  She has been working with physical therapy twice a week and has been making good progress.  Patient is ambulating well with a walker today.  She continues to take Lovenox daily.          PAST MEDICAL HISTORY:  Past Medical History:   Diagnosis Date   • Anxiety    • Hematuria    • Hypertension    • Irregular heart beat    • Kidney stone May 2021       PAST SURGICAL HISTORY:  Past Surgical History:   Procedure Laterality Date   • BILATERAL SALPINGOOPHORECTOMY      last assessed 14   • BREAST BIOPSY Left     EXCISIONAL BIOPSY BENIGN   • CHOLECYSTECTOMY     • COLONOSCOPY  10/31/2022   • HYSTERECTOMY     • JOINT REPLACEMENT Right     knee   • KNEE SURGERY     • OOPHORECTOMY Bilateral     ((Pt Qnr Sub: decrease kidney function))    • MO ARTHRP  KNE CONDYLE&PLATU MEDIAL&LAT COMPARTMENTS Left 5/5/2025    Procedure: ARTHROPLASTY KNEE TOTAL W ROBOT;  Surgeon: Mynor Sarabia MD;  Location: [facility];  Service: Orthopedics       FAMILY HISTORY:  Family History   Problem Relation Age of Onset   • Arthritis Mother    • Irregular heart beat Mother    • Hypertension Mother    • Hyperlipidemia Mother    • Cancer Father         Bladder - cancer treated. No longer has.   • Stroke Father    • Arthritis Father    • ROOPA disease Sister    • ROOPA disease Sister    • Ulcerative colitis Daughter    • No Known Problems Son    • Breast cancer Maternal Grandmother 80   • No Known Problems Maternal Grandfather    • Dementia Paternal Grandmother    • Obesity Paternal Grandmother    • Emphysema Paternal Grandfather    • Heart attack Paternal Grandfather    • Breast cancer Maternal Aunt 70   • Breast cancer Maternal Aunt 70   • No Known Problems Maternal Aunt    • No Known Problems Paternal Aunt    • No Known Problems Paternal Aunt    • Uterine cancer Cousin 40       SOCIAL HISTORY:  Social History     Tobacco Use   • Smoking status: Never   • Smokeless tobacco: Never   Vaping Use   • Vaping status: Never Used   Substance Use Topics   • Alcohol use: No   • Drug use: No       MEDICATIONS:    Current Outpatient Medications:   •  acetaminophen (TYLENOL) 500 mg tablet, Take 2 tablets (1,000 mg total) by mouth every 6 (six) hours as needed for mild pain, Disp: 120 tablet, Rfl: 0  •  acetaminophen (TYLENOL) 500 mg tablet, Take 2 tablets (1,000 mg total) by mouth every 6 (six) hours as needed for mild pain, Disp: 120 tablet, Rfl: 2  •  ALPRAZolam (XANAX) 0.5 mg tablet, Take by mouth as needed, Disp: , Rfl:   •  amLODIPine-benazepril (LOTREL 5-20) 5-20 MG per capsule, , Disp: , Rfl:   •  Carboxymethylcellul-Glycerin (Refresh Optive) 1-0.9 % GEL, , Disp: , Rfl:   •  Cholecalciferol (Vitamin D3) 50 MCG (2000 UT) capsule, , Disp: , Rfl:   •  DHA-EPA-Flaxseed Oil-Vitamin E (THERA TEARS)  "CAPS, Take by mouth, Disp: , Rfl:   •  enoxaparin (LOVENOX) 40 mg/0.4 mL, Inject 0.4 mL (40 mg total) under the skin daily for 28 days Start injections after surgery, Disp: 11.2 mL, Rfl: 0  •  escitalopram (LEXAPRO) 10 mg tablet, Take 10 mg by mouth daily, Disp: , Rfl:   •  estradiol (ESTRACE) 0.1 mg/g vaginal cream, Insert 1 g into the vagina 2 (two) times a week, Disp: 42.5 g, Rfl: 3  •  Glycerin, PF, (Optase Dry Eye Intense) 0.2 % SOLN, , Disp: , Rfl:   •  meloxicam (MOBIC) 15 mg tablet, , Disp: , Rfl:   •  methocarbamol (ROBAXIN) 500 mg tablet, Take 1 tablet (500 mg total) by mouth 3 (three) times a day as needed for muscle spasms, Disp: 60 tablet, Rfl: 0  •  methocarbamol (ROBAXIN) 500 mg tablet, Take 1 tablet (500 mg total) by mouth 3 (three) times a day as needed for muscle spasms, Disp: 60 tablet, Rfl: 0  •  metoprolol succinate (TOPROL-XL) 25 mg 24 hr tablet, TAKE 1 TABLET BY MOUTH EVERY DAY, Disp: 90 tablet, Rfl: 1  •  multivitamin (THERAGRAN) TABS, Take 1 tablet by mouth daily, Disp: , Rfl:   •  nystatin-triamcinolone (MYCOLOG-II) cream, Apply topically 2 (two) times a day, Disp: 60 g, Rfl: 2  •  oxyCODONE (Roxicodone) 5 immediate release tablet, Take 1 tablet (5 mg total) by mouth every 6 (six) hours as needed for moderate pain for up to 10 days Max Daily Amount: 20 mg, Disp: 30 tablet, Rfl: 0    ALLERGIES:  No Known Allergies    LABS:  HgA1c:   Lab Results   Component Value Date    HGBA1C 5.8 (H) 04/07/2025     BMP:   Lab Results   Component Value Date    GLUCOSE 83 04/28/2015    CALCIUM 9.1 05/06/2025     04/28/2015    K 4.2 05/06/2025    CO2 28 05/06/2025     05/06/2025    BUN 10 05/06/2025    CREATININE 0.58 (L) 05/06/2025     CBC: No components found for: \"CBC\"    _____________________________________________________  PHYSICAL EXAMINATION:  Vital signs: Ht 5' 1\" (1.549 m)   BMI 29.85 kg/m²   General: No acute distress, awake and alert  Psychiatric: Mood and affect appear " appropriate  HEENT: Trachea Midline, No torticollis, no apparent facial trauma  Cardiovascular: No audible murmurs; Extremities appear perfused  Pulmonary: No audible wheezing or stridor  Skin: No open lesions; see further details (if any) below    MUSCULOSKELETAL EXAMINATION:  Ortho Exam  Left knee:  Incision clean dry and intact  Surgical dressing removed in office today  Robinson well approximated   No erythema   mild ecchymosis of leg  Appropriate swelling of lower limb  Appropriate warmth of knee  Extensor mechanism intact  Extension 5  Flexion 60  Calf compartments soft and supple  Sensation intact  Toes are warm sensate and mobile     ___________________________________________________  STUDIES REVIEWED:  I personally reviewed the images obtained in office today and my independent interpretation is as follows:    Left knee x-ray:   Well aligned prosthesis without evidence of fractures, acute changes, or hardware failure  Prosthesis appears properly sized and properly bonded to surrounding bone         PROCEDURES PERFORMED:    None preformed       Briana Joseph PA-C

## 2025-05-15 ENCOUNTER — OFFICE VISIT (OUTPATIENT)
Dept: PHYSICAL THERAPY | Facility: REHABILITATION | Age: 70
End: 2025-05-15
Payer: MEDICARE

## 2025-05-15 DIAGNOSIS — Z96.652 S/P TOTAL KNEE ARTHROPLASTY, LEFT: Primary | ICD-10-CM

## 2025-05-15 PROCEDURE — 97112 NEUROMUSCULAR REEDUCATION: CPT

## 2025-05-15 PROCEDURE — 97140 MANUAL THERAPY 1/> REGIONS: CPT

## 2025-05-15 PROCEDURE — 97110 THERAPEUTIC EXERCISES: CPT

## 2025-05-15 NOTE — PROGRESS NOTES
"Daily Note     Today's date: 5/15/2025  Patient name: Iva Reid  : 1955  MRN: [id number]  Referring provider: No ref. provider found  Dx:   Encounter Diagnosis     ICD-10-CM    1. S/P total knee arthroplasty, left  Z96.652           Start Time: 1100  Stop Time: 1145  Total time in clinic (min): 45 minutes    Subjective: Patient still is doing well with RW. Pain is controlled via medication. Has a lot of sciatic nerve pain in posterior aspect of her knee.       Objective: See treatment diary below    Assessment: Patient tolerated treatment session well with continue focus on gentle progressive mobility and quad neuromuscular control. Patient demonstrated improve knee mobility post-manuals and repetitions. Introduced to stair straining today with modified height. Patient will continue to be appropriate for skilled outpatient physical therapy in order to address impairments and maximize function s/p L TKA. 1:1 with Yolanda Cheema, PT, DPT for entirety of treatment session.        Plan: Continue per plan of care.      Precautions: L TKA 25, HTN, Kidney     POC expires Unit limit Auth Expiration date PT/OT + Visit Limit?   25 bomn 25 bomn         Visit/Unit Tracking  AUTH Status:  Date       10 visits every RE Used 1 2 3       Remaining  9 8 7         Pertinent Findings:                                                                                             Test / Measure  25  Pre-Op Test 25  Post-Op Test   FOTO (Predicted )     L Knee AROM Ext:   A: -25   P: -20 P!    Flex:  A: 120 P!  P: 125 Ext:  A: -30      Flex:   A: 65 P!   L Knee MMT    5xSTS  17.80\"  No UE support  No Foam assist Defer to next RE   TUG With SPC     19.53\"      No SPC      18.45\" Defer to next RE        Access Code: [email or web address]  URL: [email or web address]  Date: 2025  Prepared by: Yolanda Cheema    Exercises  - Supine Gluteal Sets  - 1-4 x daily - 7 x weekly - 1 sets - 10 reps " "- 10 second hold  - Supine Heel Slide  - 1-4 x daily - 7 x weekly - 1 sets - 10 reps - 5-10 second hold  - Supine Quad Set  - 1-4 x daily - 7 x weekly - 1-2 sets - 10 reps - 5-10 second hold  - Supine Hip Abduction  - 1-4 x daily - 7 x weekly - 3 sets - 10 reps  - Supine Active Straight Leg Raise  - 1-3 x daily - 7 x weekly - 3 sets - 10 reps - 2 second hold  - Supine Bridge  - 1 x daily - 7 x weekly - 3 sets - 5-8 reps  - Seated Knee Extension AAROM  - 1-4 x daily - 7 x weekly - 3 sets - 10 reps  - Seated Knee Flexion Extension AAROM with Overpressure  - 1-4 x daily - 7 x weekly - 3 sets - 10 reps  - Seated Knee Extension Stretch with Chair  - 1-3 x daily - 7 x weekly - 1 sets - 1 reps - 5 minutes hold  - Seated Calf Stretch with Strap  - 1-3 x daily - 7 x weekly - 1 sets - 10 reps - 10 second hold  - Seated Ankle Pumps  - 1-4 x daily - 7 x weekly - 3 sets - 10 reps    Patient Education  - Pain Management  - TKR: How Early Mobility Aids Recovery   - TKR: Surgery Overview  - TKR: Your Nerves and Your Knee--Pain Neuroscience Education for Knee Replacement    Manuals 4/28 5/8 5/12  5/15     L Knee PROM   AR  AR  AR     L Patella Mobilization   AR  AR  AR     L Tibiofemoral Posterior Mob     AR  AR                  Neuro Re-Ed            Quadset   to pillow  5\"x20  to pillow  5\"x20  to pillow  5\"x20     SAQ on bolster   15x 15x    SLR   + strap  PT Ax1  2x5  +strap  15x  +strap  15x     Single Leg Balance            Eccentric Step Downs                          Ther Ex             HEP/Patient Education AR performed  AR performed/reviewed         NuStep or RB    NV NS  NS   8' L5  RB  10'     Heel Slides with Strap    5\"x10 5\"x10       LAQ   2x10 3x10    HS Stretch with Strap        Bridge        Standing Hip Abduction        15x  B/L      Standing Heel Raises/Toe Raises        20x  B/L                                                             Ther Activity              Fwd Step-Ups        4\"  10x      Lat Step-Ups  " "      4\"  10x     Gait Training                                         Modalities                                                 "

## 2025-05-19 ENCOUNTER — OFFICE VISIT (OUTPATIENT)
Dept: PHYSICAL THERAPY | Facility: REHABILITATION | Age: 70
End: 2025-05-19
Payer: MEDICARE

## 2025-05-19 DIAGNOSIS — Z96.652 S/P TOTAL KNEE ARTHROPLASTY, LEFT: Primary | ICD-10-CM

## 2025-05-19 PROCEDURE — 97140 MANUAL THERAPY 1/> REGIONS: CPT

## 2025-05-19 PROCEDURE — 97110 THERAPEUTIC EXERCISES: CPT

## 2025-05-19 PROCEDURE — 97112 NEUROMUSCULAR REEDUCATION: CPT

## 2025-05-19 NOTE — PROGRESS NOTES
"Daily Note     Today's date: 2025  Patient name: Iva Reid  : 1955  MRN: [id number]  Referring provider: Mynor Sarabia,*  Dx:   Encounter Diagnosis     ICD-10-CM    1. S/P total knee arthroplasty, left  Z96.652           Start Time: 1045  Stop Time: 1130  Total time in clinic (min): 45 minutes      Subjective: Patient still is doing well with RW. Pain is controlled via medication. Continues to have intermittent sciatic nerve pain in posterior aspect of her knee. Being able to get comfortable with sleeping has been difficult, wants to learn better positions.       Objective: See treatment diary below    Assessment: Patient tolerated treatment session well with continue focus on gentle progressive mobility and quad neuromuscular control. Patient demonstrated improve knee mobility post-manuals and repetitions. Introduced to stair straining today with modified height. Was given neurodynamic due to increase neural sensitivity. Patient will continue to be appropriate for skilled outpatient physical therapy in order to address impairments and maximize function s/p L TKA. 1:1 with Yolanda Cheema, PT, DPT for entirety of treatment session.        Plan: Continue per plan of care.      Precautions: L TKA 25, HTN, Kidney     POC expires Unit limit Auth Expiration date PT/OT + Visit Limit?   25 bomn 25 bomn         Visit/Unit Tracking  AUTH Status:  Date      10 visits every RE Used 1 2 3 4      Remaining  9 8 7 6        Pertinent Findings:                                                                                             Test / Measure  25  Pre-Op Test 25  Post-Op Test   FOTO (Predicted )     L Knee AROM Ext:   A: -25   P: -20 P!    Flex:  A: 120 P!  P: 125 Ext:  A: -30      Flex:   A: 65 P!   L Knee MMT    5xSTS  17.80\"  No UE support  No Foam assist Defer to next RE   TUG With SPC     19.53\"      No SPC      18.45\" Defer to next RE        Access " "Code: [email or web address]  URL: [email or web address]  Date: 04/28/2025  Prepared by: Yolanda Cheema    Exercises  - Supine Gluteal Sets  - 1-4 x daily - 7 x weekly - 1 sets - 10 reps - 10 second hold  - Supine Heel Slide  - 1-4 x daily - 7 x weekly - 1 sets - 10 reps - 5-10 second hold  - Supine Quad Set  - 1-4 x daily - 7 x weekly - 1-2 sets - 10 reps - 5-10 second hold  - Supine Hip Abduction  - 1-4 x daily - 7 x weekly - 3 sets - 10 reps  - Supine Active Straight Leg Raise  - 1-3 x daily - 7 x weekly - 3 sets - 10 reps - 2 second hold  - Supine Bridge  - 1 x daily - 7 x weekly - 3 sets - 5-8 reps  - Seated Knee Extension AAROM  - 1-4 x daily - 7 x weekly - 3 sets - 10 reps  - Seated Knee Flexion Extension AAROM with Overpressure  - 1-4 x daily - 7 x weekly - 3 sets - 10 reps  - Seated Knee Extension Stretch with Chair  - 1-3 x daily - 7 x weekly - 1 sets - 1 reps - 5 minutes hold  - Seated Calf Stretch with Strap  - 1-3 x daily - 7 x weekly - 1 sets - 10 reps - 10 second hold  - Seated Ankle Pumps  - 1-4 x daily - 7 x weekly - 3 sets - 10 reps    Patient Education  - Pain Management  - TKR: How Early Mobility Aids Recovery   - TKR: Surgery Overview  - TKR: Your Nerves and Your Knee--Pain Neuroscience Education for Knee Replacement    Manuals 4/28  5/8  5/12  5/15  5/19   L Knee PROM   AR  AR  AR  AR   L Patella Mobilization   AR  AR  AR  AR   L Tibiofemoral Posterior Mob     AR  AR  AR                Neuro Re-Ed            SAQ on bolster   15x 15x 20x   SLR   + strap  PT Ax1  2x5  +strap  15x  +strap  15x  no strap  3x5   Slump Sliders     2x10   Single Leg Balance            Eccentric Step Downs                          Ther Ex             HEP/Patient Education AR performed  AR performed/reviewed         NuStep or RB    NV NS  NS   8' L5  RB  10' RB   8'   Heel Slides with Strap    5\"x10 5\"x10   5\"x10   LAQ   2x10 3x10 3x10   HS Stretch with Strap     30\"x2   Bridge        Standing Hip Abduction        15x  B/L " " 20x  B/l     Standing Heel Raises/Toe Raises        20x  B/L 20x  B/L                                                           Ther Activity              Fwd Step-Ups        4\"  10x NV    Lat Step-Ups        4\"  10x  NV   Gait Training                                         Modalities                                                 "

## 2025-05-20 ENCOUNTER — OFFICE VISIT (OUTPATIENT)
Dept: OBGYN CLINIC | Facility: HOSPITAL | Age: 70
End: 2025-05-20

## 2025-05-20 VITALS — HEIGHT: 61 IN | BODY MASS INDEX: 29.85 KG/M2

## 2025-05-20 DIAGNOSIS — Z96.652 AFTERCARE FOLLOWING LEFT KNEE JOINT REPLACEMENT SURGERY: Primary | ICD-10-CM

## 2025-05-20 DIAGNOSIS — Z47.1 AFTERCARE FOLLOWING LEFT KNEE JOINT REPLACEMENT SURGERY: Primary | ICD-10-CM

## 2025-05-20 PROCEDURE — 99024 POSTOP FOLLOW-UP VISIT: CPT | Performed by: ORTHOPAEDIC SURGERY

## 2025-05-20 NOTE — PROGRESS NOTES
Name: Iva Reid      : 1955       MRN: [id number]   Encounter Provider: Mynor Sarabia MD   Encounter Date: 25  Encounter department: [facility]     ASSESSMENT & PLAN:  Assessment & Plan  Aftercare following left knee joint replacement surgery  Continue physical therapy   Continue weight bearing activities as tolerated   Continue pain medications as needed   Continue lovenox as prescribed    Staples removed in office today, incision cleaned, steri-strips applied   Follow up in 4 weeks for 6 week post-op appointment              To do next visit:  Return in about 4 weeks (around 2025) for 6 week post-op appointment.    _____________________________________________________  CHIEF COMPLAINT:  Chief Complaint   Patient presents with   • Left Knee - Post-op         SUBJECTIVE:  Iva Reid is a 70 y.o. female who presents 2 weeks status post left total knee arthroplasty.  She is doing well.  She admits to continued pain of the left knee which she has been able to manage well with oxycodone, Robaxin, and Tylenol as needed.  She continues to work with physical therapy twice a week and admits to making good progress.  Patient is ambulating well with a walker.  She continues to take Lovenox daily.      PAST MEDICAL HISTORY:  Past Medical History:   Diagnosis Date   • Anxiety    • Hematuria    • Hypertension    • Irregular heart beat    • Kidney stone May 2021       PAST SURGICAL HISTORY:  Past Surgical History:   Procedure Laterality Date   • BILATERAL SALPINGOOPHORECTOMY      last assessed 14   • BREAST BIOPSY Left     EXCISIONAL BIOPSY BENIGN   • CHOLECYSTECTOMY     • COLONOSCOPY  10/31/2022   • HYSTERECTOMY     • JOINT REPLACEMENT Right     knee   • KNEE SURGERY     • OOPHORECTOMY Bilateral     ((Pt Qnr Sub: decrease kidney function))    • SC ARTHRP KNE CONDYLE&PLATU MEDIAL&LAT COMPARTMENTS Left 2025    Procedure: ARTHROPLASTY  "KNEE TOTAL W ROBOT;  Surgeon: Mynor Sarabia MD;  Location:  MAIN OR;  Service: Orthopedics       FAMILY HISTORY:  Family History   Problem Relation Age of Onset   • Arthritis Mother    • Irregular heart beat Mother    • Hypertension Mother    • Hyperlipidemia Mother    • Cancer Father         Bladder - cancer treated. No longer has.   • Stroke Father    • Arthritis Father    • ROOPA disease Sister    • ROOPA disease Sister    • Ulcerative colitis Daughter    • No Known Problems Son    • Breast cancer Maternal Grandmother 80   • No Known Problems Maternal Grandfather    • Dementia Paternal Grandmother    • Obesity Paternal Grandmother    • Emphysema Paternal Grandfather    • Heart attack Paternal Grandfather    • Breast cancer Maternal Aunt 70   • Breast cancer Maternal Aunt 70   • No Known Problems Maternal Aunt    • No Known Problems Paternal Aunt    • No Known Problems Paternal Aunt    • Uterine cancer Cousin 40       SOCIAL HISTORY:  Social History[1]    MEDICATIONS:  Current Medications[2]    ALLERGIES:  Allergies[3]    LABS:  HgA1c:   Lab Results   Component Value Date    HGBA1C 5.8 (H) 04/07/2025     BMP:   Lab Results   Component Value Date    GLUCOSE 83 04/28/2015    CALCIUM 9.1 05/06/2025     04/28/2015    K 4.2 05/06/2025    CO2 28 05/06/2025     05/06/2025    BUN 10 05/06/2025    CREATININE 0.58 (L) 05/06/2025     CBC: No components found for: \"CBC\"    _____________________________________________________  PHYSICAL EXAMINATION:  Vital signs: Ht 5' 1\" (1.549 m)   BMI 29.85 kg/m²   General: No acute distress, awake and alert  Psychiatric: Mood and affect appear appropriate  HEENT: Trachea Midline, No torticollis, no apparent facial trauma  Cardiovascular: No audible murmurs; Extremities appear perfused  Pulmonary: No audible wheezing or stridor  Skin: No open lesions; see further details (if any) below    MUSCULOSKELETAL EXAMINATION:  Ortho Exam  Left knee:  Incision clean dry and " intact  Staples well approximated; removed in office today  Incision cleaned, steri-strips applied    No erythema    mild ecchymosis of leg  Appropriate swelling of lower limb  Appropriate warmth of knee  Extensor mechanism intact  Extension near full  Flexion 90  Calf compartments soft and supple  Sensation intact  Toes are warm sensate and mobile     ___________________________________________________  STUDIES REVIEWED:  I personally reviewed the images obtained in office today and my independent interpretation is as follows:    None performed      PROCEDURES PERFORMED:    None preformed       Briana Joseph PA-C         [1]  Social History  Tobacco Use   • Smoking status: Never   • Smokeless tobacco: Never   Vaping Use   • Vaping status: Never Used   Substance Use Topics   • Alcohol use: No   • Drug use: No   [2]    Current Outpatient Medications:   •  acetaminophen (TYLENOL) 500 mg tablet, Take 2 tablets (1,000 mg total) by mouth every 6 (six) hours as needed for mild pain, Disp: 120 tablet, Rfl: 0  •  acetaminophen (TYLENOL) 500 mg tablet, Take 2 tablets (1,000 mg total) by mouth every 6 (six) hours as needed for mild pain, Disp: 120 tablet, Rfl: 2  •  ALPRAZolam (XANAX) 0.5 mg tablet, Take by mouth as needed, Disp: , Rfl:   •  amLODIPine-benazepril (LOTREL 5-20) 5-20 MG per capsule, , Disp: , Rfl:   •  Carboxymethylcellul-Glycerin (Refresh Optive) 1-0.9 % GEL, , Disp: , Rfl:   •  Cholecalciferol (Vitamin D3) 50 MCG (2000 UT) capsule, , Disp: , Rfl:   •  DHA-EPA-Flaxseed Oil-Vitamin E (THERA TEARS) CAPS, Take by mouth, Disp: , Rfl:   •  enoxaparin (LOVENOX) 40 mg/0.4 mL, Inject 0.4 mL (40 mg total) under the skin daily for 28 days Start injections after surgery, Disp: 11.2 mL, Rfl: 0  •  escitalopram (LEXAPRO) 10 mg tablet, Take 10 mg by mouth daily, Disp: , Rfl:   •  estradiol (ESTRACE) 0.1 mg/g vaginal cream, Insert 1 g into the vagina 2 (two) times a week, Disp: 42.5 g, Rfl: 3  •  Glycerin, PF, (Optase Dry Eye  Intense) 0.2 % SOLN, , Disp: , Rfl:   •  meloxicam (MOBIC) 15 mg tablet, , Disp: , Rfl:   •  methocarbamol (ROBAXIN) 500 mg tablet, Take 1 tablet (500 mg total) by mouth 3 (three) times a day as needed for muscle spasms, Disp: 60 tablet, Rfl: 0  •  methocarbamol (ROBAXIN) 500 mg tablet, Take 1 tablet (500 mg total) by mouth 3 (three) times a day as needed for muscle spasms, Disp: 60 tablet, Rfl: 0  •  metoprolol succinate (TOPROL-XL) 25 mg 24 hr tablet, TAKE 1 TABLET BY MOUTH EVERY DAY, Disp: 90 tablet, Rfl: 1  •  multivitamin (THERAGRAN) TABS, Take 1 tablet by mouth daily, Disp: , Rfl:   •  nystatin-triamcinolone (MYCOLOG-II) cream, Apply topically 2 (two) times a day, Disp: 60 g, Rfl: 2  •  oxyCODONE (Roxicodone) 5 immediate release tablet, Take 1 tablet (5 mg total) by mouth every 6 (six) hours as needed for moderate pain for up to 10 days Max Daily Amount: 20 mg, Disp: 30 tablet, Rfl: 0[3]  No Known Allergies

## 2025-05-22 ENCOUNTER — OFFICE VISIT (OUTPATIENT)
Dept: PHYSICAL THERAPY | Facility: REHABILITATION | Age: 70
End: 2025-05-22
Payer: MEDICARE

## 2025-05-22 DIAGNOSIS — Z96.652 S/P TOTAL KNEE ARTHROPLASTY, LEFT: Primary | ICD-10-CM

## 2025-05-22 PROCEDURE — 97110 THERAPEUTIC EXERCISES: CPT

## 2025-05-22 PROCEDURE — 97112 NEUROMUSCULAR REEDUCATION: CPT

## 2025-05-22 NOTE — PROGRESS NOTES
"Daily Note     Today's date: 2025  Patient name: Iva Reid  : 1955  MRN: [id number]  Referring provider: Mynor Sarabia,*  Dx:   Encounter Diagnosis     ICD-10-CM    1. S/P total knee arthroplasty, left  Z96.652             Start Time: 1100  Stop Time: 1147  Total time in clinic (min): 47 minutes      Subjective: Patient had staples removed earlier this week. Is doing better s/p. Has been busy now that mom is admitted to the hospital and with dad also being on hospice. Hasn't been able to be consistent with exercise. Is feeling slight anteriolateral knee pain, potentially tendon irritation per Ortho.      Objective: See treatment diary below    Assessment: Patient tolerated treatment session well with continue focus on improving her knee ROM and quad neuromuscular control. She is progressing well I would like to see her transition to the cane by NV, educated to work on over the weekend. Patient will continue to be appropriate for skilled outpatient physical therapy in order to address impairments and maximize function s/p L TKA. 1:1 with Yolanda Cheema, PT, DPT for entirety of treatment session.        Plan: Continue per plan of care.      Precautions: L TKA 25, HTN, Kidney     POC expires Unit limit Auth Expiration date PT/OT + Visit Limit?   25 bomn 25 bomn         Visit/Unit Tracking  AUTH Status:  Date     10 visits every RE Used 1 2 3 4 5     Remaining  9 8 7 6 5       Pertinent Findings:                                                                                             Test / Measure  25  Pre-Op Test 25  Post-Op Test   FOTO (Predicted )     L Knee AROM Ext:   A: -25   P: -20 P!    Flex:  A: 120 P!  P: 125 Ext:  A: -30      Flex:   A: 65 P!   L Knee MMT    5xSTS  17.80\"  No UE support  No Foam assist Defer to next RE   TUG With SPC     19.53\"      No SPC      18.45\" Defer to next RE        Access Code: [email or web address]  URL: " "[email or web address]  Date: 04/28/2025  Prepared by: Yolanda Cheema    Exercises  - Supine Gluteal Sets  - 1-4 x daily - 7 x weekly - 1 sets - 10 reps - 10 second hold  - Supine Heel Slide  - 1-4 x daily - 7 x weekly - 1 sets - 10 reps - 5-10 second hold  - Supine Quad Set  - 1-4 x daily - 7 x weekly - 1-2 sets - 10 reps - 5-10 second hold  - Supine Hip Abduction  - 1-4 x daily - 7 x weekly - 3 sets - 10 reps  - Supine Active Straight Leg Raise  - 1-3 x daily - 7 x weekly - 3 sets - 10 reps - 2 second hold  - Supine Bridge  - 1 x daily - 7 x weekly - 3 sets - 5-8 reps  - Seated Knee Extension AAROM  - 1-4 x daily - 7 x weekly - 3 sets - 10 reps  - Seated Knee Flexion Extension AAROM with Overpressure  - 1-4 x daily - 7 x weekly - 3 sets - 10 reps  - Seated Knee Extension Stretch with Chair  - 1-3 x daily - 7 x weekly - 1 sets - 1 reps - 5 minutes hold  - Seated Calf Stretch with Strap  - 1-3 x daily - 7 x weekly - 1 sets - 10 reps - 10 second hold  - Seated Ankle Pumps  - 1-4 x daily - 7 x weekly - 3 sets - 10 reps    Patient Education  - Pain Management  - TKR: How Early Mobility Aids Recovery   - TKR: Surgery Overview  - TKR: Your Nerves and Your Knee--Pain Neuroscience Education for Knee Replacement    Manuals 4/28  5/8  5/12  5/15  5/19 5/22   L Knee PROM   AR  AR  AR  AR AR   L Patella Mobilization   AR  AR  AR  AR AR   L Tibiofemoral Posterior Mob     AR  AR  AR AR                 Neuro Re-Ed             Quadset      #4  5\" hold  3'   SAQ on bolster   15x 15x 20x    SLR   + strap  PT Ax1  2x5  +strap  15x  +strap  15x  no strap  3x5 Strap  20x   Slump Sliders     2x10 HEP   Single Leg Balance             Eccentric Step Downs                            Ther Ex              HEP/Patient Education AR performed  AR performed/reviewed          NuStep or RB    NV NS  NS   8' L5  RB  10' RB   8' RB 8'   Heel Slides with Strap    5\"x10 5\"x10   5\"x10 5\"x20   LAQ   2x10 3x10 3x10 3x10   HS Stretch with Strap  " "   30\"x2 30\"x3   Standing Hip Abduction        15x  B/L  20x  B/l  20x  B/L    Standing Heel Raises/Toe Raises        20x  B/L 20x  B/L 20x  B/L                                                               Ther Activity               Fwd Step-Ups        4\"  10x NV 6\"  10x    Lat Step-Ups        4\"  10x  NV 6\"  10x   Gait Training                                            Modalities                                                    "

## 2025-05-27 ENCOUNTER — OFFICE VISIT (OUTPATIENT)
Dept: PHYSICAL THERAPY | Facility: REHABILITATION | Age: 70
End: 2025-05-27
Payer: MEDICARE

## 2025-05-27 DIAGNOSIS — Z47.1 AFTERCARE FOLLOWING LEFT KNEE JOINT REPLACEMENT SURGERY: Primary | ICD-10-CM

## 2025-05-27 DIAGNOSIS — Z96.652 AFTERCARE FOLLOWING LEFT KNEE JOINT REPLACEMENT SURGERY: Primary | ICD-10-CM

## 2025-05-27 DIAGNOSIS — Z96.652 S/P TOTAL KNEE ARTHROPLASTY, LEFT: Primary | ICD-10-CM

## 2025-05-27 PROCEDURE — 97112 NEUROMUSCULAR REEDUCATION: CPT

## 2025-05-27 PROCEDURE — 97110 THERAPEUTIC EXERCISES: CPT

## 2025-05-27 RX ORDER — OXYCODONE HYDROCHLORIDE 5 MG/1
5 TABLET ORAL EVERY 6 HOURS PRN
Qty: 20 TABLET | Refills: 0 | Status: SHIPPED | OUTPATIENT
Start: 2025-05-27

## 2025-05-27 NOTE — PROGRESS NOTES
"Daily Note     Today's date: 2025  Patient name: Iva Reid  : 1955  MRN: [id number]  Referring provider: Mynor Sarabia,*  Dx:   Encounter Diagnosis     ICD-10-CM    1. S/P total knee arthroplasty, left  Z96.652             Start Time: 1018  Stop Time: 1113  Total time in clinic (min): 55 minutes      Subjective: Has been busy now that mom is admitted to the hospital and with dad also being on hospice. Hasn't been able to be consistent with exercise. Is feeling slight anteriolateral knee pain still. She is ready to transition out of the RW to the SPC.      Objective: See treatment diary below    Assessment: Patient tolerated treatment session well with continue focus on improving her knee ROM and quad neuromuscular control. Progressed to SPC with ease. Patient reported challenge with stair negotiation. Has good control with STS transfers. Patient will continue to be appropriate for skilled outpatient physical therapy in order to address impairments and maximize function s/p L TKA. 1:1 with Yolanda Cheema, PT, DPT for entirety of treatment session.        Plan: Continue per plan of care.      Precautions: L TKA 25, HTN, Kidney     POC expires Unit limit Auth Expiration date PT/OT + Visit Limit?   25 bomn 25 bomn         Visit/Unit Tracking  AUTH Status:  Date    10 visits every RE Used 1 2 3 4 5 6    Remaining  9 8 7 6 5 4      Pertinent Findings:                                                                                             Test / Measure  25  Pre-Op Test 25  Post-Op Test   FOTO (Predicted )     L Knee AROM Ext:   A: -25   P: -20 P!    Flex:  A: 120 P!  P: 125 Ext:  A: -30      Flex:   A: 65 P!   L Knee MMT    5xSTS  17.80\"  No UE support  No Foam assist Defer to next RE   TUG With SPC     19.53\"      No SPC      18.45\" Defer to next RE        Access Code: [email or web address]  URL: [email or web address]  Date: " "04/28/2025  Prepared by: Yolanda Cheema    Exercises  - Supine Gluteal Sets  - 1-4 x daily - 7 x weekly - 1 sets - 10 reps - 10 second hold  - Supine Heel Slide  - 1-4 x daily - 7 x weekly - 1 sets - 10 reps - 5-10 second hold  - Supine Quad Set  - 1-4 x daily - 7 x weekly - 1-2 sets - 10 reps - 5-10 second hold  - Supine Hip Abduction  - 1-4 x daily - 7 x weekly - 3 sets - 10 reps  - Supine Active Straight Leg Raise  - 1-3 x daily - 7 x weekly - 3 sets - 10 reps - 2 second hold  - Supine Bridge  - 1 x daily - 7 x weekly - 3 sets - 5-8 reps  - Seated Knee Extension AAROM  - 1-4 x daily - 7 x weekly - 3 sets - 10 reps  - Seated Knee Flexion Extension AAROM with Overpressure  - 1-4 x daily - 7 x weekly - 3 sets - 10 reps  - Seated Knee Extension Stretch with Chair  - 1-3 x daily - 7 x weekly - 1 sets - 1 reps - 5 minutes hold  - Seated Calf Stretch with Strap  - 1-3 x daily - 7 x weekly - 1 sets - 10 reps - 10 second hold  - Seated Ankle Pumps  - 1-4 x daily - 7 x weekly - 3 sets - 10 reps    Patient Education  - Pain Management  - TKR: How Early Mobility Aids Recovery   - TKR: Surgery Overview  - TKR: Your Nerves and Your Knee--Pain Neuroscience Education for Knee Replacement    Manuals 4/28  5/8  5/12  5/15  5/19 5/22 5/27   L Knee PROM   AR  AR  AR  AR AR AR   L Patella Mobilization   AR  AR  AR  AR AR AR   L Tibiofemoral Posterior Mob     AR  AR  AR AR AR                  Neuro Re-Ed              Quadset      #4  5\" hold  3' #5  3'     SLR   + strap  PT Ax1  2x5  +strap  15x  +strap  15x  no strap  3x5 Strap  20x No strap  20x   Single Leg Balance              Eccentric Step Downs                              Ther Ex               HEP/Patient Education AR performed  AR performed/reviewed           NuStep or RB    NV NS  NS   8' L5  RB  10' RB   8' RB 8' NS  8' L5   Heel Slides with Strap    5\"x10 5\"x10   5\"x10 5\"x20 5\"x10   LAQ   2x10 3x10 3x10 3x10 #1  3x10   HS Stretch with Strap     30\"x2 30\"x3 30\"x3   Standing " "Hip Abduction        15x  B/L  20x  B/l  20x  B/L HEP    Standing Heel Raises/Toe Raises        20x  B/L 20x  B/L 20x  B/L HEP    STS            1 foam  3x5                                                     Ther Activity                Fwd Step-Ups        4\"  10x NV 6\"  10x 6\"  15x    Lat Step-Ups        4\"  10x  NV 6\"  10x 6\"  15x   Gait Training                                               Modalities                                                       "

## 2025-05-29 ENCOUNTER — OFFICE VISIT (OUTPATIENT)
Dept: PHYSICAL THERAPY | Facility: REHABILITATION | Age: 70
End: 2025-05-29
Payer: MEDICARE

## 2025-05-29 DIAGNOSIS — Z96.652 S/P TOTAL KNEE ARTHROPLASTY, LEFT: Primary | ICD-10-CM

## 2025-05-29 DIAGNOSIS — M17.12 PRIMARY OSTEOARTHRITIS OF LEFT KNEE: ICD-10-CM

## 2025-05-29 PROCEDURE — 97112 NEUROMUSCULAR REEDUCATION: CPT

## 2025-05-29 PROCEDURE — 97140 MANUAL THERAPY 1/> REGIONS: CPT

## 2025-05-29 PROCEDURE — 97110 THERAPEUTIC EXERCISES: CPT

## 2025-05-29 RX ORDER — METHOCARBAMOL 500 MG/1
TABLET, FILM COATED ORAL
Qty: 60 TABLET | Refills: 0 | Status: SHIPPED | OUTPATIENT
Start: 2025-05-29

## 2025-05-29 NOTE — PROGRESS NOTES
"Daily Note     Today's date: 2025  Patient name: Iva Reid  : 1955  MRN: [id number]  Referring provider: Mynor Sarabia,*  Dx:   Encounter Diagnosis     ICD-10-CM    1. S/P total knee arthroplasty, left  Z96.652             Start Time: 1045  Stop Time: 1130  Total time in clinic (min): 45 minutes      Subjective: Has been doing well with HEP, using the bike, and transitioning to SPC. Presents with mostly stiffness today.      Objective: See treatment diary below    Assessment: Patient tolerated treatment session well. She has been tolerating increase weight bearing exercises including STS transfers and step-ups. Does have tendency to off-load surgical leg which is to be anticipated. Patient will continue to be appropriate for skilled outpatient physical therapy in order to address impairments and maximize function s/p L TKA. 1:1 with Yolanda Cheema, PT, DPT for entirety of treatment session.        Plan: Continue per plan of care.      Precautions: L TKA 25, HTN, Kidney     POC expires Unit limit Auth Expiration date PT/OT + Visit Limit?   25 bomn 25 bomn         Visit/Unit Tracking  AUTH Status:  Date    10 visits every RE Used 1 2 3 4 5 6 7    Remaining  9 8 7 6 5 4 3      Pertinent Findings:                                                                                             Test / Measure  25  Pre-Op Test 25  Post-Op Test   FOTO (Predicted )     L Knee AROM Ext:   A: -25   P: -20 P!    Flex:  A: 120 P!  P: 125 Ext:  A: -30      Flex:   A: 65 P!   L Knee MMT    5xSTS  17.80\"  No UE support  No Foam assist Defer to next RE   TUG With SPC     19.53\"      No SPC      18.45\" Defer to next RE        Access Code: [email or web address]  URL: [email or web address]  Date: 2025  Prepared by: Yolanda Cheema    Exercises  - Supine Gluteal Sets  - 1-4 x daily - 7 x weekly - 1 sets - 10 reps - 10 second hold  - Supine Heel Slide  " "- 1-4 x daily - 7 x weekly - 1 sets - 10 reps - 5-10 second hold  - Supine Quad Set  - 1-4 x daily - 7 x weekly - 1-2 sets - 10 reps - 5-10 second hold  - Supine Hip Abduction  - 1-4 x daily - 7 x weekly - 3 sets - 10 reps  - Supine Active Straight Leg Raise  - 1-3 x daily - 7 x weekly - 3 sets - 10 reps - 2 second hold  - Supine Bridge  - 1 x daily - 7 x weekly - 3 sets - 5-8 reps  - Seated Knee Extension AAROM  - 1-4 x daily - 7 x weekly - 3 sets - 10 reps  - Seated Knee Flexion Extension AAROM with Overpressure  - 1-4 x daily - 7 x weekly - 3 sets - 10 reps  - Seated Knee Extension Stretch with Chair  - 1-3 x daily - 7 x weekly - 1 sets - 1 reps - 5 minutes hold  - Seated Calf Stretch with Strap  - 1-3 x daily - 7 x weekly - 1 sets - 10 reps - 10 second hold  - Seated Ankle Pumps  - 1-4 x daily - 7 x weekly - 3 sets - 10 reps    Patient Education  - Pain Management  - TKR: How Early Mobility Aids Recovery   - TKR: Surgery Overview  - TKR: Your Nerves and Your Knee--Pain Neuroscience Education for Knee Replacement    Manuals 4/28  5/8  5/12  5/15  5/19 5/22 5/27 5/29   L Knee PROM   AR  AR  AR  AR AR AR AR   L Patella Mobilization   AR  AR  AR  AR AR AR AR   L Tibiofemoral Posterior Mob     AR  AR  AR AR AR AR                   Neuro Re-Ed               Quadset      #4  5\" hold  3' #5  3'   #5  3'   SLR   + strap  PT Ax1  2x5  +strap  15x  +strap  15x  no strap  3x5 Strap  20x No strap  20x No strap  20x   Single Leg Balance               Eccentric Step Downs                                Ther Ex                HEP/Patient Education AR performed  AR performed/reviewed            NuStep or RB    NV NS  NS   8' L5  RB  10' RB   8' RB 8' NS  8' L5 RB 8'   Heel Slides with Strap    5\"x10 5\"x10   5\"x10 5\"x20 5\"x10 5\"x10   LAQ   2x10 3x10 3x10 3x10 #1  3x10 #2  25x   HS Stretch with Strap     30\"x2 30\"x3 30\"x3 30\"x3   Standing Hip Abduction        15x  B/L  20x  B/l  20x  B/L HEP Foam  20x  B/L    STS            1 " "foam  3x5   1 foam  3x5                                                      Ther Activity                 Fwd Step-Ups        4\"  10x NV 6\"  10x 6\"  15x 6\"  20x    Lat Step-Ups        4\"  10x  NV 6\"  10x 6\"  15x 6\"  20x   Gait Training                                                  Modalities                                                          "

## 2025-06-03 ENCOUNTER — OFFICE VISIT (OUTPATIENT)
Dept: PHYSICAL THERAPY | Facility: REHABILITATION | Age: 70
End: 2025-06-03
Payer: MEDICARE

## 2025-06-03 DIAGNOSIS — Z96.652 S/P TOTAL KNEE ARTHROPLASTY, LEFT: Primary | ICD-10-CM

## 2025-06-03 PROCEDURE — 97140 MANUAL THERAPY 1/> REGIONS: CPT

## 2025-06-03 PROCEDURE — 97110 THERAPEUTIC EXERCISES: CPT

## 2025-06-03 NOTE — PROGRESS NOTES
"Daily Note     Today's date: 6/3/2025  Patient name: Iva Reid  : 1955  MRN: [id number]  Referring provider: No ref. provider found  Dx:   Encounter Diagnosis     ICD-10-CM    1. S/P total knee arthroplasty, left  Z96.652             Start Time: 1045  Stop Time: 1130  Total time in clinic (min): 45 minutes      Subjective: Has been walking very well with SPC. Stiffness continues to be consistent, pain isn't too bad. Walking without SPC has been difficulty since lower back pain flare.      Objective: See treatment diary below    Post-Exercise Vitals:  BP: 138/90  HR: 72bpm  SpO2: 97%    Assessment: Patient tolerated treatment session fair. She has been tolerating increase weight bearing exercises including STS transfers and step-ups over these last few visits. Does have tendency to off-load surgical leg which is to be anticipated. Gait is smooth and stable with SPC, again slight WS to right. Patient was slightly limited with supine table exercises secondary left-sided lower back pain. Upon supine to sit transfer, patient became dizzy and \"off\". Patient reported response was potentially from pain. Vitals were assessed and WNL based on post-exertional response. Monitor NV. Patient will continue to be appropriate for skilled outpatient physical therapy in order to address impairments and maximize function s/p L TKA. 1:1 with Yolanda Cheema PT, DPT for entirety of treatment session.        Plan: Continue per plan of care.      Precautions: L TKA 25, HTN, Kidney     POC expires Unit limit Auth Expiration date PT/OT + Visit Limit?   25 bomn 25 bomn         Visit/Unit Tracking  AUTH Status:  Date  6/3   10 visits every RE Used 1 2 3 4 5 6 7 8    Remaining  9 8 7 6 5 4 3 2      Pertinent Findings:                                                                                             Test / Measure  25  Pre-Op Test 25  Post-Op Test   FOTO (Predicted )   " "  L Knee AROM Ext:   A: -25   P: -20 P!    Flex:  A: 120 P!  P: 125 Ext:  A: -30      Flex:   A: 65 P!   L Knee MMT 4/5 2/5   5xSTS  17.80\"  No UE support  No Foam assist Defer to next RE   TUG With SPC     19.53\"      No SPC      18.45\" Defer to next RE        Access Code: [email or web address]  URL: [email or web address]  Date: 04/28/2025  Prepared by: Yolanda Cheema    Exercises  - Supine Gluteal Sets  - 1-4 x daily - 7 x weekly - 1 sets - 10 reps - 10 second hold  - Supine Heel Slide  - 1-4 x daily - 7 x weekly - 1 sets - 10 reps - 5-10 second hold  - Supine Quad Set  - 1-4 x daily - 7 x weekly - 1-2 sets - 10 reps - 5-10 second hold  - Supine Hip Abduction  - 1-4 x daily - 7 x weekly - 3 sets - 10 reps  - Supine Active Straight Leg Raise  - 1-3 x daily - 7 x weekly - 3 sets - 10 reps - 2 second hold  - Supine Bridge  - 1 x daily - 7 x weekly - 3 sets - 5-8 reps  - Seated Knee Extension AAROM  - 1-4 x daily - 7 x weekly - 3 sets - 10 reps  - Seated Knee Flexion Extension AAROM with Overpressure  - 1-4 x daily - 7 x weekly - 3 sets - 10 reps  - Seated Knee Extension Stretch with Chair  - 1-3 x daily - 7 x weekly - 1 sets - 1 reps - 5 minutes hold  - Seated Calf Stretch with Strap  - 1-3 x daily - 7 x weekly - 1 sets - 10 reps - 10 second hold  - Seated Ankle Pumps  - 1-4 x daily - 7 x weekly - 3 sets - 10 reps    Patient Education  - Pain Management  - TKR: How Early Mobility Aids Recovery   - TKR: Surgery Overview  - TKR: Your Nerves and Your Knee--Pain Neuroscience Education for Knee Replacement    Manuals  5/8  5/12  5/15  5/19 5/22 5/27 5/29 6/3   L Knee PROM  AR  AR  AR  AR AR AR AR AR   L Patella Mobilization  AR  AR  AR  AR AR AR AR AR   L Tibiofemoral Posterior Mob    AR  AR  AR AR AR AR AR                   Neuro Re-Ed               Quadset     #4  5\" hold  3' #5  3'   #5  3' #5  3'   SLR  + strap  PT Ax1  2x5  +strap  15x  +strap  15x  no strap  3x5 Strap  20x No strap  20x No strap  20x #3  2x8   Single " "Leg Balance               Eccentric Step Downs             4\"  10x                   Ther Ex                HEP/Patient Education  AR performed/reviewed             NuStep or RB  NV NS  NS   8' L5  RB  10' RB   8' RB 8' NS  8' L5 RB 8' RB 8'   Heel Slides with Strap  5\"x10 5\"x10   5\"x10 5\"x20 5\"x10 5\"x10 5\"x10   LAQ  2x10 3x10 3x10 3x10 #1  3x10 #2  25x #3  3x10   Bridge        3x5   HS Stretch with Strap    30\"x2 30\"x3 30\"x3 30\"x3 30\"x3   Standing Hip Abduction      15x  B/L  20x  B/l  20x  B/L HEP Foam  20x  B/L     STS          1 foam  3x5   1 foam  3x5                                                    Ther Activity                Fwd Step-Ups      4\"  10x NV 6\"  10x 6\"  15x 6\"  20x     Lat Step-Ups      4\"  10x  NV 6\"  10x 6\"  15x 6\"  20x    Gait Training                                               Modalities                                                       "

## 2025-06-05 ENCOUNTER — OFFICE VISIT (OUTPATIENT)
Dept: PHYSICAL THERAPY | Facility: REHABILITATION | Age: 70
End: 2025-06-05
Payer: MEDICARE

## 2025-06-05 DIAGNOSIS — Z96.652 S/P TOTAL KNEE ARTHROPLASTY, LEFT: Primary | ICD-10-CM

## 2025-06-05 PROCEDURE — 97112 NEUROMUSCULAR REEDUCATION: CPT

## 2025-06-05 PROCEDURE — 97110 THERAPEUTIC EXERCISES: CPT

## 2025-06-05 NOTE — PROGRESS NOTES
"Daily Note     Today's date: 2025  Patient name: Iva Reid  : 1955  MRN: [id number]  Referring provider: Cabrera Kilpatrick DO  Dx:   Encounter Diagnosis     ICD-10-CM    1. S/P total knee arthroplasty, left  Z96.652             Start Time: 1045  Stop Time: 1130  Total time in clinic (min): 45 minutes      Subjective: Has been walking very well with intermittent SPC usage. She is feeling better since coming off opioid pain medication. She believes it may have been a side effect due to the medication along with the Robaxin.    Objective: See treatment diary below      Assessment: Patient tolerated treatment session fair. Due to no clinical presentations of dizziness, patient was able to resume normal intensity of exercise. Her PROM has been progressing well and close to WNL. She was able to perform leg press and eccentric step downs. Quad continues to be relatively weak with poor neuromuscular control. Patient will continue to be appropriate for skilled outpatient physical therapy in order to address impairments and maximize function s/p L TKA. 1:1 with oYlanda Cheema, PT, DPT for entirety of treatment session.        Plan: Continue per plan of care.      Precautions: L TKA 25, HTN, Kidney     POC expires Unit limit Auth Expiration date PT/OT + Visit Limit?   25 bomn 25 bomn         Visit/Unit Tracking  AUTH Status:  Date  6/3 6/5   10 visits every RE Used 1 2 3 4 5 6 7 8 9    Remaining  9 8 7 6 5 4 3 2 1      Pertinent Findings:                                                                                             Test / Measure  25  Pre-Op Test 25  Post-Op Test   FOTO (Predicted )     L Knee AROM Ext:   A: -25   P: -20 P!    Flex:  A: 120 P!  P: 125 Ext:  A: -30      Flex:   A: 65 P!   L Knee MMT    5xSTS  17.80\"  No UE support  No Foam assist Defer to next RE   TUG With SPC     19.53\"      No SPC      18.45\" Defer to next RE        " "Access Code: [email or web address]  URL: [email or web address]  Date: 04/28/2025  Prepared by: Yolanda Cheema    Exercises  - Supine Gluteal Sets  - 1-4 x daily - 7 x weekly - 1 sets - 10 reps - 10 second hold  - Supine Heel Slide  - 1-4 x daily - 7 x weekly - 1 sets - 10 reps - 5-10 second hold  - Supine Quad Set  - 1-4 x daily - 7 x weekly - 1-2 sets - 10 reps - 5-10 second hold  - Supine Hip Abduction  - 1-4 x daily - 7 x weekly - 3 sets - 10 reps  - Supine Active Straight Leg Raise  - 1-3 x daily - 7 x weekly - 3 sets - 10 reps - 2 second hold  - Supine Bridge  - 1 x daily - 7 x weekly - 3 sets - 5-8 reps  - Seated Knee Extension AAROM  - 1-4 x daily - 7 x weekly - 3 sets - 10 reps  - Seated Knee Flexion Extension AAROM with Overpressure  - 1-4 x daily - 7 x weekly - 3 sets - 10 reps  - Seated Knee Extension Stretch with Chair  - 1-3 x daily - 7 x weekly - 1 sets - 1 reps - 5 minutes hold  - Seated Calf Stretch with Strap  - 1-3 x daily - 7 x weekly - 1 sets - 10 reps - 10 second hold  - Seated Ankle Pumps  - 1-4 x daily - 7 x weekly - 3 sets - 10 reps    Patient Education  - Pain Management  - TKR: How Early Mobility Aids Recovery   - TKR: Surgery Overview  - TKR: Your Nerves and Your Knee--Pain Neuroscience Education for Knee Replacement    Manuals  5/8  5/12  5/15  5/19 5/22 5/27 5/29 6/3 6/5   L Knee PROM  AR  AR  AR  AR AR AR AR AR AR   L Patella Mobilization  AR  AR  AR  AR AR AR AR AR AR   L Tibiofemoral Posterior Mob    AR  AR  AR AR AR AR AR AR                    Neuro Re-Ed                Quadset     #4  5\" hold  3' #5  3'   #5  3' #5  3'    SLR  + strap  PT Ax1  2x5  +strap  15x  +strap  15x  no strap  3x5 Strap  20x No strap  20x No strap  20x #3  2x8 #3  3x6   Single Leg Balance                Eccentric Step Downs             4\"  10x Slant board  2x10                    Ther Ex                 HEP/Patient Education  AR performed/reviewed              NuStep or RB  NV NS  NS   8' L5  RB  10' RB   8' RB " "8' NS  8' L5 RB 8' RB 8' RB 8'   Heel Slides with Strap  5\"x10 5\"x10   5\"x10 5\"x20 5\"x10 5\"x10 5\"x10 5\"x10   SL Hip Abduction         3x10   LAQ  2x10 3x10 3x10 3x10 #1  3x10 #2  25x #3  3x10 #3  3x10   Bridge        3x5 np   HS Stretch with Strap    30\"x2 30\"x3 30\"x3 30\"x3 30\"x3    Standing Hip Abduction      15x  B/L  20x  B/l  20x  B/L HEP Foam  20x  B/L      STS          1 foam  3x5   1 foam  3x5  1 foam  3x6    Leg Press             #25  3x10                                     Ther Activity                 Fwd Step-Ups      4\"  10x NV 6\"  10x 6\"  15x 6\"  20x      Lat Step-Ups      4\"  10x  NV 6\"  10x 6\"  15x 6\"  20x     Gait Training                                                  Modalities                                                          "

## 2025-06-10 ENCOUNTER — EVALUATION (OUTPATIENT)
Dept: PHYSICAL THERAPY | Facility: REHABILITATION | Age: 70
End: 2025-06-10
Payer: MEDICARE

## 2025-06-10 DIAGNOSIS — Z96.652 S/P TOTAL KNEE ARTHROPLASTY, LEFT: Primary | ICD-10-CM

## 2025-06-10 PROCEDURE — 97140 MANUAL THERAPY 1/> REGIONS: CPT

## 2025-06-10 PROCEDURE — 97110 THERAPEUTIC EXERCISES: CPT

## 2025-06-10 PROCEDURE — 97164 PT RE-EVAL EST PLAN CARE: CPT

## 2025-06-10 NOTE — PROGRESS NOTES
PT Post-Op TKA Re-Evaluation     Today's date: 6/10/2025  Patient name: Iva Reid  : 1955  MRN: [id number]  Referring provider: Mynor Sarabia,*  Dx:   Encounter Diagnosis     ICD-10-CM    1. S/P total knee arthroplasty, left  Z96.652             Start Time: 1015  Stop Time: 1100  Total time in clinic (min): 45 minutes    Assessment  Impairments: abnormal coordination, abnormal gait, abnormal or restricted ROM, activity intolerance, impaired balance, impaired physical strength, lacks appropriate home exercise program, pain with function, weight-bearing intolerance, poor body mechanics, participation limitations, activity limitations and endurance  Symptom irritability: moderate    Assessment details: Iva Reid is a 70 y.o. female who presents to outpatient physical therapy for re-evaluation for s/p approx 1 month L TKA they underwent on 25 performed by Dr. Mynor Sarabia. Upon further clinical re-testing, patient demonstrated improvements in her active and passive knee ROM, LE strength, and functional capacity per performance on TUG and 5xSTS. She is surpassing on some of her pre-op function. She continues to lack TKE, overall functional strength, and endurance which will be worked on over the next few weeks. Goal is to get her to be more independent at home with certain ADLs, walking longer distances, and improved postural stability. Pt was educated on expectations and post-op protocols with demonstrated verbal understanding.  Positive prognostic indicators include positive attitude toward recovery, good understanding of diagnosis and treatment plan options, and absence of observed red flags.  Negative prognostic indicators include chronicity of symptoms, hypertension, high symptom irritability, and multiple prior failed treatments. Pt would benefit from skilled OP physical therapy to address these, and the below impairments in order to optimize outcomes and promote return to  functional baseline.      Patient verbalized understanding of POC.    Please contact me if you have any questions or recommendations. Thank you for the referral and the opportunity to share in Iva Reid's care     Barriers to intervention: medical complexity  Understanding of Dx/Px/POC: good     Prognosis: good    Goals  Short Term Goals:  In 4-6 weeks, the patient will:  1. Pt will report at least a 25% reduction in subjective pain complaints/symptoms to better manage ADLs and household chores.  MET  2. Pt will improve atleast a half a grade more on LE MMT  MET  3. Pt independent with initial HEP, rationale, technique and frequency, for ROM and pain control.  MET  4. Pt will be able to begin ambulating household distances with least restrictive assistive device   MET  5. Pt will be able to tolerate performing functional tests and measures in future session   MET      Long Term Goals:  In 12+ weeks, the patient will:  1. Pt will have atleast % improvement in post-op knee ROM and 5/5 on graded LE MMT MET  2. FOTO to greater than predicted value.   Improving  3. Independent with comprehensive HEP upon discharge.  Improving  4. Pt will be able to perform ADLs, iADLS, and household duties with minimal restriction indicating return to PLOF.  Improving  5. Pt independent with rationale, technique and plan for performance of advanced HEP to ensure independent self-management of symptoms upon discharge.   Improving  6. Pt will be able to ambulate within the community without any assistive device   Improving    Plan  Patient would benefit from: PT eval and skilled physical therapy  Referral necessary: No  Planned modality interventions: cryotherapy, biofeedback and TENS    Planned therapy interventions: activity modification, ADL retraining, joint mobilization, manual therapy, massage, balance, balance/weight bearing training, behavior modification, body mechanics training, muscle pump exercises, motor  coordination training, nerve gliding, neuromuscular re-education, patient education, postural training, community reintegration, self care, sensory integrative techniques, strengthening, stretching, therapeutic activities, therapeutic exercise, therapeutic training, home exercise program, graded motor, graded exercise, graded activity, functional ROM exercises, gait training, abdominal trunk stabilization, IASTM and patient/caregiver education    Frequency: 1-2x week  Plan of Care beginning date: 5/5/2025  Plan of Care expiration date: 8/5/2025  Treatment plan discussed with: patient and family        Subjective Evaluation    History of Present Illness  Date of surgery: 5/5/2025  Mechanism of injury: surgery  Mechanism of injury: RE 6/10: Has been walking very well without the cane. She is getting better getting up and down the steps reciprocally more, but am going slow. Down the steps is harder. Using the RB daily. Pain has transitioned more to tightness. Sleep is better, but at times uncomfortable without a pillow and restless.     IE 4/28: Patient presents for PT for TKA pre-op evaluation. She is scheduled to have her L TKA on 5/5/25 performed by Dr. Mynor Sarabia. Patient has a history of left knee issues with noting of consistent pain with weight bearing activities and frequent episodes of joint effusion, requiring frequent arthrocentesis. At this time she had failed conservative measures and is now a surgical candidate.    Denies any red flags which include: fever, chills, chest pain, focal neurologic deficits, urinary distention, urinary incontinence, fecal incontinence, saddle anesthesia.    Aggravating Factors: all weight bearing tasks  Relieving Factors: rest    Personal Functional Goals: gardening, walking, improved sleep quality            Recurrent probem    Quality of life: good    Patient Goals  Patient goals for therapy: increased strength, independence with ADLs/IADLs, return to sport/leisure  "activities, increased motion, decreased edema, decreased pain and improved balance    Pain  Current pain ratin  At best pain ratin  At worst pain ratin  Location: Left Knee  Quality: dull ache, discomfort, grinding and throbbing  Relieving factors: medications, change in position, relaxation, rest and support  Aggravating factors: sitting, stair climbing, standing and walking    Social Support  Steps to enter house: yes  5  Stairs in house: yes   14  Lives in: multiple-level home  Lives with: spouse    Employment status: not working  Exercise history: Walking      Diagnostic Tests  X-ray: abnormal  Treatments  Previous treatment: medication, injection treatment and physical therapy  Current treatment: physical therapy        Objective      Pertinent Findings:                                                                                             Test / Measure  25  Pre-Op Test 25  Post-Op Test 6/10/25   FOTO (Predicted )      L Knee AROM Ext:   A: -25   P: -20 P!    Flex:  A: 120 P!  P: 125 Ext:  A: -30      Flex:   A: 65 P! Ext:  A: -10  P: -5 P!    Flex: 120 P!   L Knee MMT    5xSTS  17.80\"  No UE support  No Foam assist Defer to next RE 14.65\"  No UE support   TUG With SPC     19.53\"      No SPC      18.45\" Defer to next RE No SPC  9.00\"       Risk Assessment and Prediction Tool results reviewed. Patient reported functional outcome scores reviewed. Discussed DOS and patient's questions were answered to patient's satisfaction. Mobility/ROM results per above. Strength results per above. Balance/Gait (including Timed Up & Go) per above. Virtual Home Assessment was reviewed with patient. Home Preparation Checklist was reviewed with patient including identification of care partner and encouragement of single level set-up. Post-operative pain management expectations discussed to the patient's satisfaction. Post-operative gait training for level ground, stairs, and car transfers was " "performed. Patient demonstrated competence with immediate post-operative home exercise program.          Precautions: L TKA 5/5/25, HTN, Kidney     POC expires Unit limit Auth Expiration date PT/OT + Visit Limit?   8/5/25 bomn 12/31/25 bomn           Visit/Unit Tracking  AUTH Status:  Date 5/19 5/22 5/27 5/29 6/3 6/5 6/10   10 visits every RE Used 4 5 6 7 8 9 10 RE    Remaining  6 5 4 3 2 1 0           Access Code: [email or web address]  URL: [email or web address]  Date: 04/28/2025  Prepared by: Yolanda Cheema    Exercises  - Supine Gluteal Sets  - 1-4 x daily - 7 x weekly - 1 sets - 10 reps - 10 second hold  - Supine Heel Slide  - 1-4 x daily - 7 x weekly - 1 sets - 10 reps - 5-10 second hold  - Supine Quad Set  - 1-4 x daily - 7 x weekly - 1-2 sets - 10 reps - 5-10 second hold  - Supine Hip Abduction  - 1-4 x daily - 7 x weekly - 3 sets - 10 reps  - Supine Active Straight Leg Raise  - 1-3 x daily - 7 x weekly - 3 sets - 10 reps - 2 second hold  - Supine Bridge  - 1 x daily - 7 x weekly - 3 sets - 5-8 reps  - Seated Knee Extension AAROM  - 1-4 x daily - 7 x weekly - 3 sets - 10 reps  - Seated Knee Flexion Extension AAROM with Overpressure  - 1-4 x daily - 7 x weekly - 3 sets - 10 reps  - Seated Knee Extension Stretch with Chair  - 1-3 x daily - 7 x weekly - 1 sets - 1 reps - 5 minutes hold  - Seated Calf Stretch with Strap  - 1-3 x daily - 7 x weekly - 1 sets - 10 reps - 10 second hold  - Seated Ankle Pumps  - 1-4 x daily - 7 x weekly - 3 sets - 10 reps    Patient Education  - Pain Management  - TKR: How Early Mobility Aids Recovery   - TKR: Surgery Overview  - TKR: Your Nerves and Your Knee--Pain Neuroscience Education for Knee Replacement    Manuals  5/15  5/19 5/22 5/27 5/29 6/3 6/5 6/10   L Knee PROM  AR  AR AR AR AR AR AR AR   L Patella Mobilization  AR  AR AR AR AR AR AR AR   L Tibiofemoral Posterior Mob  AR  AR AR AR AR AR AR AR                 Neuro Re-Ed             Quadset   #4  5\" hold  3' #5  3'   #5  3' " "#5  3'     SLR  +strap  15x  no strap  3x5 Strap  20x No strap  20x No strap  20x #3  2x8 #3  3x6 #3  3x8   Single Leg Balance             Eccentric Step Downs         4\"  10x Slant board  2x10 Slant board  2x10                 Ther Ex              HEP/Patient Education             NuStep or RB  RB  10' RB   8' RB 8' NS  8' L5 RB 8' RB 8' RB 8' NS 8'   Heel Slides with Strap   5\"x10 5\"x20 5\"x10 5\"x10 5\"x10 5\"x10    SL Hip Abduction       3x10    LAQ 3x10 3x10 3x10 #1  3x10 #2  25x #3  3x10 #3  3x10 Titan  #10  3x10   Bridge      3x5 np    HS Stretch with Strap  30\"x2 30\"x3 30\"x3 30\"x3 30\"x3     Standing Hip Abduction  15x  B/L  20x  B/l  20x  B/L HEP Foam  20x  B/L   Foam  2x10  B/L    STS      1 foam  3x5   1 foam  3x5  1 foam  3x6 No foam  #5  3x6    Leg Press         #25  3x10 #40  3x10    L only  #25  2x10                               Ther Activity              Fwd Step-Ups  4\"  10x NV 6\"  10x 6\"  15x 6\"  20x   9\"  20x    Lat Step-Ups  4\"  10x  NV 6\"  10x 6\"  15x 6\"  20x   9\"  20x   Gait Training                                         Modalities                                                  "

## 2025-06-12 ENCOUNTER — OFFICE VISIT (OUTPATIENT)
Dept: PHYSICAL THERAPY | Facility: REHABILITATION | Age: 70
End: 2025-06-12
Payer: MEDICARE

## 2025-06-12 DIAGNOSIS — Z96.652 S/P TOTAL KNEE ARTHROPLASTY, LEFT: Primary | ICD-10-CM

## 2025-06-12 PROCEDURE — 97140 MANUAL THERAPY 1/> REGIONS: CPT

## 2025-06-12 PROCEDURE — 97110 THERAPEUTIC EXERCISES: CPT

## 2025-06-12 NOTE — PROGRESS NOTES
Daily Note     Today's date: 2025  Patient name: Iva Reid  : 1955  MRN: [id number]  Referring provider: No ref. provider found  Dx:   Encounter Diagnosis     ICD-10-CM    1. S/P total knee arthroplasty, left  Z96.652           Start Time: 1018  Stop Time: 1115  Total time in clinic (min): 57 minutes    Subjective: She continues to be doing well other than stiffness and pain.      Objective: See treatment diary below      Assessment: Patient tolerated treatment session well today with focus on progressing current POC. She continues to be challenged with eccentric loading the knee. Patient will continue to be appropriate for skilled outpatient physical therapy in order to address impairments and maximize function s/p L TKA. 1:1 with Yolanda Cheema, PT, DPT for entirety of treatment session.        Plan: Continue per plan of care.  Progress treatment as tolerated.       Precautions: L TKA 25, HTN, Kidney     POC expires Unit limit Auth Expiration date PT/OT + Visit Limit?   25 bomn 25 bomn           Visit/Unit Tracking  AUTH Status:  Date    10 visits every RE Used 1    Remaining  9           Access Code: [email or web address]  URL: [email or web address]  Date: 2025  Prepared by: Yolanda Cheema    Exercises  - Supine Gluteal Sets  - 1-4 x daily - 7 x weekly - 1 sets - 10 reps - 10 second hold  - Supine Heel Slide  - 1-4 x daily - 7 x weekly - 1 sets - 10 reps - 5-10 second hold  - Supine Quad Set  - 1-4 x daily - 7 x weekly - 1-2 sets - 10 reps - 5-10 second hold  - Supine Hip Abduction  - 1-4 x daily - 7 x weekly - 3 sets - 10 reps  - Supine Active Straight Leg Raise  - 1-3 x daily - 7 x weekly - 3 sets - 10 reps - 2 second hold  - Supine Bridge  - 1 x daily - 7 x weekly - 3 sets - 5-8 reps  - Seated Knee Extension AAROM  - 1-4 x daily - 7 x weekly - 3 sets - 10 reps  - Seated Knee Flexion Extension AAROM with Overpressure  - 1-4 x daily - 7 x weekly - 3 sets - 10 reps  - Seated  "Knee Extension Stretch with Chair  - 1-3 x daily - 7 x weekly - 1 sets - 1 reps - 5 minutes hold  - Seated Calf Stretch with Strap  - 1-3 x daily - 7 x weekly - 1 sets - 10 reps - 10 second hold  - Seated Ankle Pumps  - 1-4 x daily - 7 x weekly - 3 sets - 10 reps    Patient Education  - Pain Management  - TKR: How Early Mobility Aids Recovery   - TKR: Surgery Overview  - TKR: Your Nerves and Your Knee--Pain Neuroscience Education for Knee Replacement    Manuals  5/15  5/19 5/22 5/27 5/29 6/3 6/5 6/10 6/12   L Knee PROM  AR  AR AR AR AR AR AR AR AR   L Patella Mobilization  AR  AR AR AR AR AR AR AR AR   L Tibiofemoral Posterior Mob  AR  AR AR AR AR AR AR AR AR                  Neuro Re-Ed              Quadset   #4  5\" hold  3' #5  3'   #5  3' #5  3'      SLR  +strap  15x  no strap  3x5 Strap  20x No strap  20x No strap  20x #3  2x8 #3  3x6 #3  3x8    Single Leg Balance              Eccentric Step Downs         4\"  10x Slant board  2x10 Slant board  2x10 Slant board  2x10                  Ther Ex               HEP/Patient Education              NuStep or RB  RB  10' RB   8' RB 8' NS  8' L5 RB 8' RB 8' RB 8' NS 8' RB 8'   Heel Slides with Strap   5\"x10 5\"x20 5\"x10 5\"x10 5\"x10 5\"x10     LAQ 3x10 3x10 3x10 #1  3x10 #2  25x #3  3x10 #3  3x10 Titan  #10  3x10 Titan  #20  2x10 b/l    #10 b/l   Bridge      3x5 np     HS Stretch with Strap  30\"x2 30\"x3 30\"x3 30\"x3 30\"x3      Standing Hip Abduction  15x  B/L  20x  B/l  20x  B/L HEP Foam  20x  B/L   Foam  2x10  B/L Foam  2x10  B/L    STS      1 foam  3x5   1 foam  3x5  1 foam  3x6 No foam  #5  3x6 No foam  #5  3x8    Leg Press         #25  3x10 #40  3x10    L only  #25  2x10 #40  3x10    L only  #25  2x10                                 Ther Activity               Fwd Step-Ups  4\"  10x NV 6\"  10x 6\"  15x 6\"  20x   9\"  20x 9\"  20x    Lat Step-Ups  4\"  10x  NV 6\"  10x 6\"  15x 6\"  20x   9\"  20x 9\"  20x   Gait Training                                            Modalities           "

## 2025-06-17 ENCOUNTER — OFFICE VISIT (OUTPATIENT)
Dept: PHYSICAL THERAPY | Facility: REHABILITATION | Age: 70
End: 2025-06-17
Payer: MEDICARE

## 2025-06-17 DIAGNOSIS — Z96.652 S/P TOTAL KNEE ARTHROPLASTY, LEFT: Primary | ICD-10-CM

## 2025-06-17 PROCEDURE — 97112 NEUROMUSCULAR REEDUCATION: CPT

## 2025-06-17 PROCEDURE — 97110 THERAPEUTIC EXERCISES: CPT

## 2025-06-17 PROCEDURE — 97530 THERAPEUTIC ACTIVITIES: CPT

## 2025-06-17 PROCEDURE — 97140 MANUAL THERAPY 1/> REGIONS: CPT

## 2025-06-17 NOTE — PROGRESS NOTES
Daily Note     Today's date: 2025  Patient name: Iva Reid  : 1955  MRN: [id number]  Referring provider: No ref. provider found  Dx:   Encounter Diagnosis     ICD-10-CM    1. S/P total knee arthroplasty, left  Z96.652           Subjective: Patient reports primary complaint is of stiffness in her knee.      Objective: See treatment diary below      Assessment: Patient tolerated treatment session well today with focus on progressing current POC. She continues to be challenged with eccentric loading the knee. Attempted use of slant board with eccentrics but patient with reports of increased ankle discomfort, transitioned to use of step. Required lower step heigh for forward eccentrics due to weakness, but improved NM control. Appropriately challenged and fatigued. Patient will continue to be appropriate for skilled outpatient physical therapy in order to address impairments and maximize function s/p L TKA.       Plan: Continue per plan of care.  Progress treatment as tolerated.       Precautions: L TKA 25, HTN, Kidney     POC expires Unit limit Auth Expiration date PT/OT + Visit Limit?   25 bomn 25 bomn           Visit/Unit Tracking  AUTH Status:  Date    10 visits every RE Used 1    Remaining  9           Access Code: [email or web address]  URL: [email or web address]  Date: 2025  Prepared by: Yolanda Cheema    Exercises  - Supine Gluteal Sets  - 1-4 x daily - 7 x weekly - 1 sets - 10 reps - 10 second hold  - Supine Heel Slide  - 1-4 x daily - 7 x weekly - 1 sets - 10 reps - 5-10 second hold  - Supine Quad Set  - 1-4 x daily - 7 x weekly - 1-2 sets - 10 reps - 5-10 second hold  - Supine Hip Abduction  - 1-4 x daily - 7 x weekly - 3 sets - 10 reps  - Supine Active Straight Leg Raise  - 1-3 x daily - 7 x weekly - 3 sets - 10 reps - 2 second hold  - Supine Bridge  - 1 x daily - 7 x weekly - 3 sets - 5-8 reps  - Seated Knee Extension AAROM  - 1-4 x daily - 7 x weekly - 3 sets - 10  "reps  - Seated Knee Flexion Extension AAROM with Overpressure  - 1-4 x daily - 7 x weekly - 3 sets - 10 reps  - Seated Knee Extension Stretch with Chair  - 1-3 x daily - 7 x weekly - 1 sets - 1 reps - 5 minutes hold  - Seated Calf Stretch with Strap  - 1-3 x daily - 7 x weekly - 1 sets - 10 reps - 10 second hold  - Seated Ankle Pumps  - 1-4 x daily - 7 x weekly - 3 sets - 10 reps    Patient Education  - Pain Management  - TKR: How Early Mobility Aids Recovery   - TKR: Surgery Overview  - TKR: Your Nerves and Your Knee--Pain Neuroscience Education for Knee Replacement    Manuals 6/17  5/19 5/22 5/27 5/29 6/3 6/5 6/10 6/12   L Knee PROM AMC  AR AR AR AR AR AR AR AR   L Patella Mobilization   AR AR AR AR AR AR AR AR   L Tibiofemoral Posterior Mob   AR AR AR AR AR AR AR AR                 Neuro Re-Ed             Quadset   #4  5\" hold  3' #5  3'   #5  3' #5  3'      SLR   no strap  3x5 Strap  20x No strap  20x No strap  20x #3  2x8 #3  3x6 #3  3x8    Single Leg Balance             Eccentric Step Downs  6'' lat  4'' fwd  2x10ea      4\"  10x Slant board  2x10 Slant board  2x10 Slant board  2x10                 Ther Ex              HEP/Patient Education             NuStep or RB RB 8' RB   8' RB 8' NS  8' L5 RB 8' RB 8' RB 8' NS 8' RB 8'   Heel Slides with Strap  5\"x10 5\"x20 5\"x10 5\"x10 5\"x10 5\"x10     LAQ Titan  #20  3x10 b/l 3x10 3x10 #1  3x10 #2  25x #3  3x10 #3  3x10 Titan  #10  3x10 Titan  #20  2x10 b/l    #10 b/l   HS curls Titan  #20  3x10           Bridge      3x5 np     HS Stretch with Strap  30\"x2 30\"x3 30\"x3 30\"x3 30\"x3      Standing Hip Abduction Foam  2x10  B/L  20x  B/l  20x  B/L HEP Foam  20x  B/L   Foam  2x10  B/L Foam  2x10  B/L    STS No foam  #5  3x8    1 foam  3x5   1 foam  3x5  1 foam  3x6 No foam  #5  3x6 No foam  #5  3x8    Leg Press #40  3x10    L only  #25  3x10       #25  3x10 #40  3x10    L only  #25  2x10 #40  3x10    L only  #25  2x10                               Ther Activity              Fwd " "Step-Ups 9\"  20x NV 6\"  10x 6\"  15x 6\"  20x   9\"  20x 9\"  20x    Lat Step-Ups 9\"  20x  NV 6\"  10x 6\"  15x 6\"  20x   9\"  20x 9\"  20x   Gait Training                                         Modalities                                                     "

## 2025-06-18 ENCOUNTER — OFFICE VISIT (OUTPATIENT)
Dept: OBGYN CLINIC | Facility: HOSPITAL | Age: 70
End: 2025-06-18

## 2025-06-18 VITALS — BODY MASS INDEX: 29.83 KG/M2 | WEIGHT: 158 LBS | HEIGHT: 61 IN

## 2025-06-18 DIAGNOSIS — Z96.652 AFTERCARE FOLLOWING LEFT KNEE JOINT REPLACEMENT SURGERY: Primary | ICD-10-CM

## 2025-06-18 DIAGNOSIS — Z47.1 AFTERCARE FOLLOWING LEFT KNEE JOINT REPLACEMENT SURGERY: Primary | ICD-10-CM

## 2025-06-18 PROCEDURE — 99024 POSTOP FOLLOW-UP VISIT: CPT | Performed by: ORTHOPAEDIC SURGERY

## 2025-06-18 NOTE — PROGRESS NOTES
"Name: Iva Reid      : 1955       MRN: [id number]   Encounter Provider: Mynor Sarabia MD   Encounter Date: 25  Encounter department: [facility]     ASSESSMENT & PLAN:  Assessment & Plan  Aftercare following left knee joint replacement surgery  DOS 2025            ___________________________________________________  6 weeks s/p robotically assisted left TKA  Overall she is doing well  Continue PT and HEP to maximize recovery  Ice and elevate  WBAT    To do next visit:  Return in about 6 weeks (around 2025) for re-check with x-rays upon arrival left knee.  ____________________________________________________  CHIEF COMPLAINT:  Chief Complaint   Patient presents with   • Left Knee - Post-op         SUBJECTIVE:  Iva Reid is a 70 y.o. female who presents today for repeat evaluation of her left knee, 6 weeks s/p TKA, 25  She has only been taking an occasional IBU as needed  Denies any calf or thigh pain  She notes swelling          PAST MEDICAL HISTORY:  Past Medical History[1]    PAST SURGICAL HISTORY:  Past Surgical History[2]    FAMILY HISTORY:  Family History[3]    SOCIAL HISTORY:  Social History[4]    MEDICATIONS:  Current Medications[5]    ALLERGIES:  Allergies[6]    LABS:  HgA1c:   Lab Results   Component Value Date    HGBA1C 5.8 (H) 2025     BMP:   Lab Results   Component Value Date    GLUCOSE 83 2015    CALCIUM 9.1 2025     2015    K 4.2 2025    CO2 28 2025     2025    BUN 10 2025    CREATININE 0.58 (L) 2025     CBC: No components found for: \"CBC\"    _____________________________________________________  PHYSICAL EXAMINATION:  Vital signs: Ht 5' 1\" (1.549 m)   Wt 71.7 kg (158 lb)   BMI 29.85 kg/m²   General: No acute distress, awake and alert  Psychiatric: Mood and affect appear appropriate  HEENT: Trachea Midline, No torticollis, no apparent facial " trauma  Cardiovascular: No audible murmurs; Extremities appear perfused  Pulmonary: No audible wheezing or stridor  Skin: Intact, no open lesions; see further details (if any) below    MUSCULOSKELETAL EXAMINATION:    Left Knee Exam     Range of Motion   Extension:  0   Flexion:  120 (patella tracks well)     Other   Erythema: absent  Swelling: mild  Effusion: effusion present    Comments:    Healed anterior incision, minimal warmth, no gross signs of infection  Calf and thigh are soft and non-tender, no signs of DVT        _____________________________________________________  STUDIES REVIEWED:    None performed:        PROCEDURES PERFORMED:    Procedures    None Performed      Scribe Attestation      I,:  Milton Mtz am acting as a scribe while in the presence of the attending physician.:       I,:  Mynor Sarabia MD personally performed the services described in this documentation    as scribed in my presence.:                  [1]  Past Medical History:  Diagnosis Date   • Anxiety    • Hematuria    • Hypertension    • Irregular heart beat    • Kidney stone May 2021   [2]  Past Surgical History:  Procedure Laterality Date   • BILATERAL SALPINGOOPHORECTOMY      last assessed 8/11/14   • BREAST BIOPSY Left     EXCISIONAL BIOPSY BENIGN   • CHOLECYSTECTOMY  2009   • COLONOSCOPY  10/31/2022   • HYSTERECTOMY  2004   • JOINT REPLACEMENT Right     knee   • KNEE SURGERY     • OOPHORECTOMY Bilateral 2004    ((Pt Qnr Sub: decrease kidney function))    • NE ARTHRP KNE CONDYLE&PLATU MEDIAL&LAT COMPARTMENTS Left 5/5/2025    Procedure: ARTHROPLASTY KNEE TOTAL W ROBOT;  Surgeon: Mynor Sarabia MD;  Location: [facility];  Service: Orthopedics   [3]  Family History  Problem Relation Name Age of Onset   • Arthritis Mother Isabelle Brown    • Irregular heart beat Mother Isabelle Brown    • Hypertension Mother Isabelle Brown    • Hyperlipidemia Mother Isabelle Brown    • Cancer Father Aisha Brown         Bladder  - cancer treated. No longer has.   • Stroke Father Aisha Brown    • Arthritis Father Aisha Brown    • ROOPA disease Sister     • ROOPA disease Sister     • Ulcerative colitis Daughter     • No Known Problems Son     • Breast cancer Maternal Grandmother Flora Correa 80   • No Known Problems Maternal Grandfather     • Dementia Paternal Grandmother     • Obesity Paternal Grandmother     • Emphysema Paternal Grandfather Aisha Brown    • Heart attack Paternal Grandfather Aisha Brown    • Breast cancer Maternal Aunt GENE 70   • Breast cancer Maternal Aunt SANDRA 70   • No Known Problems Maternal Aunt FLORA    • No Known Problems Paternal Aunt CLIFF    • No Known Problems Paternal Aunt SYLVIE    • Uterine cancer Cousin TREVER 40   [4]  Social History  Tobacco Use   • Smoking status: Never   • Smokeless tobacco: Never   Vaping Use   • Vaping status: Never Used   Substance Use Topics   • Alcohol use: No   • Drug use: No   [5]    Current Outpatient Medications:   •  oxyCODONE (Roxicodone) 5 immediate release tablet, Take 1 tablet (5 mg total) by mouth every 6 (six) hours as needed for moderate pain Max Daily Amount: 20 mg, Disp: 20 tablet, Rfl: 0  •  acetaminophen (TYLENOL) 500 mg tablet, Take 2 tablets (1,000 mg total) by mouth every 6 (six) hours as needed for mild pain, Disp: 120 tablet, Rfl: 0  •  acetaminophen (TYLENOL) 500 mg tablet, Take 2 tablets (1,000 mg total) by mouth every 6 (six) hours as needed for mild pain, Disp: 120 tablet, Rfl: 2  •  ALPRAZolam (XANAX) 0.5 mg tablet, Take by mouth as needed, Disp: , Rfl:   •  amLODIPine-benazepril (LOTREL 5-20) 5-20 MG per capsule, , Disp: , Rfl:   •  Carboxymethylcellul-Glycerin (Refresh Optive) 1-0.9 % GEL, , Disp: , Rfl:   •  Cholecalciferol (Vitamin D3) 50 MCG (2000 UT) capsule, , Disp: , Rfl:   •  DHA-EPA-Flaxseed Oil-Vitamin E (THERA TEARS) CAPS, Take by mouth, Disp: , Rfl:   •  enoxaparin (LOVENOX) 40 mg/0.4 mL, Inject 0.4 mL (40 mg total) under the skin daily  for 28 days Start injections after surgery, Disp: 11.2 mL, Rfl: 0  •  escitalopram (LEXAPRO) 10 mg tablet, Take 10 mg by mouth daily, Disp: , Rfl:   •  estradiol (ESTRACE) 0.1 mg/g vaginal cream, Insert 1 g into the vagina 2 (two) times a week, Disp: 42.5 g, Rfl: 3  •  Glycerin, PF, (Optase Dry Eye Intense) 0.2 % SOLN, , Disp: , Rfl:   •  meloxicam (MOBIC) 15 mg tablet, , Disp: , Rfl:   •  methocarbamol (ROBAXIN) 500 mg tablet, Take 1 tablet (500 mg total) by mouth 3 (three) times a day as needed for muscle spasms, Disp: 60 tablet, Rfl: 0  •  methocarbamol (ROBAXIN) 500 mg tablet, TAKE 1 TABLET(500 MG) BY MOUTH THREE TIMES DAILY AS NEEDED FOR MUSCLE SPASMS, Disp: 60 tablet, Rfl: 0  •  metoprolol succinate (TOPROL-XL) 25 mg 24 hr tablet, TAKE 1 TABLET BY MOUTH EVERY DAY, Disp: 90 tablet, Rfl: 1  •  multivitamin (THERAGRAN) TABS, Take 1 tablet by mouth daily, Disp: , Rfl:   •  nystatin-triamcinolone (MYCOLOG-II) cream, Apply topically 2 (two) times a day, Disp: 60 g, Rfl: 2  [6]  No Known Allergies

## 2025-06-20 ENCOUNTER — OFFICE VISIT (OUTPATIENT)
Dept: PHYSICAL THERAPY | Facility: REHABILITATION | Age: 70
End: 2025-06-20
Payer: MEDICARE

## 2025-06-20 DIAGNOSIS — Z96.652 S/P TOTAL KNEE ARTHROPLASTY, LEFT: Primary | ICD-10-CM

## 2025-06-20 PROCEDURE — 97110 THERAPEUTIC EXERCISES: CPT

## 2025-06-20 NOTE — PROGRESS NOTES
Daily Note     Today's date: 2025  Patient name: Iva Reid  : 1955  MRN: [id number]  Referring provider: Cabrera Kilpatrick DO  Dx:   Encounter Diagnosis     ICD-10-CM    1. S/P total knee arthroplasty, left  Z96.652           Subjective: Had a good follow-up with Dr. Sarabia this week. ROM is looking great as well as her walking.    Objective: See treatment diary below      Assessment: Patient tolerated treatment session well today with focus on progressing current POC. She is improving with eccentric loading the knee demonstrated with step-downs. Is progressing well with loading tolerance via STS, leg press, and LAQ. Appropriately challenged and fatigued. Patient will continue to be appropriate for skilled outpatient physical therapy in order to address impairments and maximize function s/p L TKA.       Plan: Continue per plan of care.  Progress treatment as tolerated.       Precautions: L TKA 25, HTN, Kidney     POC expires Unit limit Auth Expiration date PT/OT + Visit Limit?   25 bomn 25 bomn           Visit/Unit Tracking  AUTH Status:  Date    10 visits every RE Used 1 2    Remaining  9 8           Access Code: [email or web address]  URL: [email or web address]  Date: 2025  Prepared by: Yolanda Cheema    Exercises  - Supine Gluteal Sets  - 1-4 x daily - 7 x weekly - 1 sets - 10 reps - 10 second hold  - Supine Heel Slide  - 1-4 x daily - 7 x weekly - 1 sets - 10 reps - 5-10 second hold  - Supine Quad Set  - 1-4 x daily - 7 x weekly - 1-2 sets - 10 reps - 5-10 second hold  - Supine Hip Abduction  - 1-4 x daily - 7 x weekly - 3 sets - 10 reps  - Supine Active Straight Leg Raise  - 1-3 x daily - 7 x weekly - 3 sets - 10 reps - 2 second hold  - Supine Bridge  - 1 x daily - 7 x weekly - 3 sets - 5-8 reps  - Seated Knee Extension AAROM  - 1-4 x daily - 7 x weekly - 3 sets - 10 reps  - Seated Knee Flexion Extension AAROM with Overpressure  - 1-4 x daily - 7 x weekly - 3 sets - 10  "reps  - Seated Knee Extension Stretch with Chair  - 1-3 x daily - 7 x weekly - 1 sets - 1 reps - 5 minutes hold  - Seated Calf Stretch with Strap  - 1-3 x daily - 7 x weekly - 1 sets - 10 reps - 10 second hold  - Seated Ankle Pumps  - 1-4 x daily - 7 x weekly - 3 sets - 10 reps    Patient Education  - Pain Management  - TKR: How Early Mobility Aids Recovery   - TKR: Surgery Overview  - TKR: Your Nerves and Your Knee--Pain Neuroscience Education for Knee Replacement    Manuals  5/19 5/22 5/27 5/29 6/3 6/5 6/10 6/12 6/20   L Knee PROM  AR AR AR AR AR AR AR AR NV   L Patella Mobilization  AR AR AR AR AR AR AR AR NV   L Tibiofemoral Posterior Mob  AR AR AR AR AR AR AR AR                  Neuro Re-Ed             SLR  no strap  3x5 Strap  20x No strap  20x No strap  20x #3  2x8 #3  3x6 #3  3x8  NV   Single Leg Balance             Eccentric Step Downs       4\"  10x Slant board  2x10 Slant board  2x10 Slant board  2x10 6\"  2x10  Toe-only                 Ther Ex              HEP/Patient Education             NuStep or RB RB   8' RB 8' NS  8' L5 RB 8' RB 8' RB 8' NS 8' RB 8' RB 8'   Heel Slides with Strap 5\"x10 5\"x20 5\"x10 5\"x10 5\"x10 5\"x10      LAQ 3x10 3x10 #1  3x10 #2  25x #3  3x10 #3  3x10 Titan  #10  3x10 Titan  #20  2x10 b/l    #10 b/l Titan  #25  2x10 b/l    #10 single   HS curls            Bridge     3x5 np      HS Stretch with Strap 30\"x2 30\"x3 30\"x3 30\"x3 30\"x3       Standing Hip Abduction  20x  B/l  20x  B/L HEP Foam  20x  B/L   Foam  2x10  B/L Foam  2x10  B/L Foam  3x10  B/L    STS    1 foam  3x5   1 foam  3x5  1 foam  3x6 No foam  #5  3x6 No foam  #5  3x8 No Foam  #8  3x6    Leg Press       #25  3x10 #40  3x10    L only  #25  2x10 #40  3x10    L only  #25  2x10 #55  3x10    L only  #25                               Ther Activity              Fwd Step-Ups NV 6\"  10x 6\"  15x 6\"  20x   9\"  20x 9\"  20x 9\"  #8  2x8    Lat Step-Ups  NV 6\"  10x 6\"  15x 6\"  20x   9\"  20x 9\"  20x 9\"  20x   Gait Training                    "                      Modalities

## 2025-06-24 ENCOUNTER — OFFICE VISIT (OUTPATIENT)
Dept: PHYSICAL THERAPY | Facility: REHABILITATION | Age: 70
End: 2025-06-24
Payer: MEDICARE

## 2025-06-24 DIAGNOSIS — Z96.652 S/P TOTAL KNEE ARTHROPLASTY, LEFT: Primary | ICD-10-CM

## 2025-06-24 PROCEDURE — 97110 THERAPEUTIC EXERCISES: CPT

## 2025-06-24 PROCEDURE — 97112 NEUROMUSCULAR REEDUCATION: CPT

## 2025-06-24 PROCEDURE — 97530 THERAPEUTIC ACTIVITIES: CPT

## 2025-06-24 NOTE — PROGRESS NOTES
Daily Note     Today's date: 2025  Patient name: Iva Reid  : 1955  MRN: [id number]  Referring provider: No ref. provider found  Dx:   Encounter Diagnosis     ICD-10-CM    1. S/P total knee arthroplasty, left  Z96.652           Subjective: States knee is doing well overall. Still experiencing some stiffness in knee.     Objective: See treatment diary below      Assessment: Continued to focus on LE strengthening. Patient is progressing well and demo improving strength. Continues to demo weakness with eccentric strengthening. Appropriately challenged and fatigued. Patient will continue to be appropriate for skilled outpatient physical therapy in order to address impairments and maximize function s/p L TKA.       Plan: Continue per plan of care.  Progress treatment as tolerated.       Precautions: L TKA 25, HTN, Kidney     POC expires Unit limit Auth Expiration date PT/OT + Visit Limit?   25 bomn 25 bomn           Visit/Unit Tracking  AUTH Status:  Date    10 visits every RE Used 3 2    Remaining  7 8           Access Code: [email or web address]  URL: [email or web address]  Date: 2025  Prepared by: Yolanda Cheema    Exercises  - Supine Gluteal Sets  - 1-4 x daily - 7 x weekly - 1 sets - 10 reps - 10 second hold  - Supine Heel Slide  - 1-4 x daily - 7 x weekly - 1 sets - 10 reps - 5-10 second hold  - Supine Quad Set  - 1-4 x daily - 7 x weekly - 1-2 sets - 10 reps - 5-10 second hold  - Supine Hip Abduction  - 1-4 x daily - 7 x weekly - 3 sets - 10 reps  - Supine Active Straight Leg Raise  - 1-3 x daily - 7 x weekly - 3 sets - 10 reps - 2 second hold  - Supine Bridge  - 1 x daily - 7 x weekly - 3 sets - 5-8 reps  - Seated Knee Extension AAROM  - 1-4 x daily - 7 x weekly - 3 sets - 10 reps  - Seated Knee Flexion Extension AAROM with Overpressure  - 1-4 x daily - 7 x weekly - 3 sets - 10 reps  - Seated Knee Extension Stretch with Chair  - 1-3 x daily - 7 x weekly - 1 sets - 1  "reps - 5 minutes hold  - Seated Calf Stretch with Strap  - 1-3 x daily - 7 x weekly - 1 sets - 10 reps - 10 second hold  - Seated Ankle Pumps  - 1-4 x daily - 7 x weekly - 3 sets - 10 reps    Patient Education  - Pain Management  - TKR: How Early Mobility Aids Recovery   - TKR: Surgery Overview  - TKR: Your Nerves and Your Knee--Pain Neuroscience Education for Knee Replacement    Manuals 6/24 5/22 5/27 5/29 6/3 6/5 6/10 6/12 6/20   L Knee PROM AMC AR AR AR AR AR AR AR NV   L Patella Mobilization  AR AR AR AR AR AR AR NV   L Tibiofemoral Posterior Mob  AR AR AR AR AR AR AR                 Neuro Re-Ed            SLR #3  3x8 Strap  20x No strap  20x No strap  20x #3  2x8 #3  3x6 #3  3x8  NV   Single Leg Balance            Eccentric Step Downs  6\"  2x10  Toe-only    4\"  10x Slant board  2x10 Slant board  2x10 Slant board  2x10 6\"  2x10  Toe-only                Ther Ex             HEP/Patient Education            NuStep or RB RB 8' RB 8' NS  8' L5 RB 8' RB 8' RB 8' NS 8' RB 8' RB 8'   Heel Slides with Strap  5\"x20 5\"x10 5\"x10 5\"x10 5\"x10      LAQ Titan  #25  2x10 b/l 3x10 #1  3x10 #2  25x #3  3x10 #3  3x10 Titan  #10  3x10 Titan  #20  2x10 b/l    #10 b/l Titan  #25  2x10 b/l    #10 single   HS curls            Bridge     3x5 np      HS Stretch with Strap  30\"x3 30\"x3 30\"x3 30\"x3       Standing Hip Abduction  20x  B/L HEP Foam  20x  B/L   Foam  2x10  B/L Foam  2x10  B/L Foam  3x10  B/L    STS No Foam  #8  3x8  1 foam  3x5   1 foam  3x5  1 foam  3x6 No foam  #5  3x6 No foam  #5  3x8 No Foam  #8  3x6    Leg Press #55  3x10    L only  #25  3x10     #25  3x10 #40  3x10    L only  #25  2x10 #40  3x10    L only  #25  2x10 #55  3x10    L only  #25                             Ther Activity             Fwd Step-Ups 9\"  #8  2x8 6\"  10x 6\"  15x 6\"  20x   9\"  20x 9\"  20x 9\"  #8  2x8    Lat Step-Ups 9\"  #8  2x8 6\"  10x 6\"  15x 6\"  20x   9\"  20x 9\"  20x 9\"  20x   Gait Training                                      Modalities           "

## 2025-06-26 ENCOUNTER — OFFICE VISIT (OUTPATIENT)
Dept: PHYSICAL THERAPY | Facility: REHABILITATION | Age: 70
End: 2025-06-26
Payer: MEDICARE

## 2025-06-26 DIAGNOSIS — Z96.652 S/P TOTAL KNEE ARTHROPLASTY, LEFT: Primary | ICD-10-CM

## 2025-06-26 PROCEDURE — 97110 THERAPEUTIC EXERCISES: CPT

## 2025-06-26 PROCEDURE — 97112 NEUROMUSCULAR REEDUCATION: CPT

## 2025-06-26 NOTE — PROGRESS NOTES
Daily Note     Today's date: 2025  Patient name: Iva Reid  : 1955  MRN: [id number]  Referring provider: Mynor Sarabia,*  Dx:   Encounter Diagnosis     ICD-10-CM    1. S/P total knee arthroplasty, left  Z96.652           Subjective: States knee is doing well overall. Still experiences stiffness.     Objective: See treatment diary below      Assessment: Continued to focus on LE strengthening. Patient is progressing well and demo improving strength. Continues to demo weakness with eccentric strengthening. Appropriately challenged and fatigued. Patient will continue to be appropriate for skilled outpatient physical therapy in order to address impairments and maximize function s/p L TKA.       Plan: Continue per plan of care.  Progress treatment as tolerated.       Precautions: L TKA 25, HTN, Kidney     POC expires Unit limit Auth Expiration date PT/OT + Visit Limit?   25 bomn 25 bomn           Visit/Unit Tracking  AUTH Status:  Date    10 visits every RE Used 3 2 4    Remaining  7 8 6           Access Code: [email or web address]  URL: [email or web address]  Date: 2025  Prepared by: Yolanda Cheema    Exercises  - Supine Gluteal Sets  - 1-4 x daily - 7 x weekly - 1 sets - 10 reps - 10 second hold  - Supine Heel Slide  - 1-4 x daily - 7 x weekly - 1 sets - 10 reps - 5-10 second hold  - Supine Quad Set  - 1-4 x daily - 7 x weekly - 1-2 sets - 10 reps - 5-10 second hold  - Supine Hip Abduction  - 1-4 x daily - 7 x weekly - 3 sets - 10 reps  - Supine Active Straight Leg Raise  - 1-3 x daily - 7 x weekly - 3 sets - 10 reps - 2 second hold  - Supine Bridge  - 1 x daily - 7 x weekly - 3 sets - 5-8 reps  - Seated Knee Extension AAROM  - 1-4 x daily - 7 x weekly - 3 sets - 10 reps  - Seated Knee Flexion Extension AAROM with Overpressure  - 1-4 x daily - 7 x weekly - 3 sets - 10 reps  - Seated Knee Extension Stretch with Chair  - 1-3 x daily - 7 x weekly - 1 sets - 1 reps  "- 5 minutes hold  - Seated Calf Stretch with Strap  - 1-3 x daily - 7 x weekly - 1 sets - 10 reps - 10 second hold  - Seated Ankle Pumps  - 1-4 x daily - 7 x weekly - 3 sets - 10 reps    Patient Education  - Pain Management  - TKR: How Early Mobility Aids Recovery   - TKR: Surgery Overview  - TKR: Your Nerves and Your Knee--Pain Neuroscience Education for Knee Replacement    Manuals 5/27 5/29 6/3 6/5 6/10 6/12 6/20 6/26   L Knee PROM AR AR AR AR AR AR NV AR   L Patella Mobilization AR AR AR AR AR AR NV AR   L Tibiofemoral Posterior Mob AR AR AR AR AR AR  AR               Neuro Re-Ed           SLR No strap  20x No strap  20x #3  2x8 #3  3x6 #3  3x8  NV #3  3x10   Eccentric Step Downs    4\"  10x Slant board  2x10 Slant board  2x10 Slant board  2x10 6\"  2x10  Toe-only 6\"  2x10  Toe-only                          Ther Ex            HEP/Patient Education           NuStep or RB NS  8' L5 RB 8' RB 8' RB 8' NS 8' RB 8' RB 8' RB 8'   LAQ #1  3x10 #2  25x #3  3x10 #3  3x10 Titan  #10  3x10 Titan  #20  2x10 b/l    #10 b/l Titan  #25  2x10 b/l    #10 single Titan  #25  2x10 b/l    #10 single   Bridge   3x5 np    3x5   Standing Hip Abduction HEP Foam  20x  B/L   Foam  2x10  B/L Foam  2x10  B/L Foam  3x10  B/L Foam  3x10  B/L    STS 1 foam  3x5   1 foam  3x5  1 foam  3x6 No foam  #5  3x6 No foam  #5  3x8 No Foam  #8  3x6 No foam  #8  3x8    Leg Press    #25  3x10 #40  3x10    L only  #25  2x10 #40  3x10    L only  #25  2x10 #55  3x10    L only  #25 #55  3x10    L only  #25                           Ther Activity            Fwd Step-Ups 6\"  15x 6\"  20x   9\"  20x 9\"  20x 9\"  #8  2x8 9\"  #8  2x8    Lat Step-Ups 6\"  15x 6\"  20x   9\"  20x 9\"  20x 9\"  20x 9\"  20x   Gait Training                                   Modalities                                            "

## 2025-07-01 ENCOUNTER — APPOINTMENT (OUTPATIENT)
Dept: PHYSICAL THERAPY | Facility: REHABILITATION | Age: 70
End: 2025-07-01
Payer: MEDICARE

## 2025-07-02 ENCOUNTER — OFFICE VISIT (OUTPATIENT)
Dept: PHYSICAL THERAPY | Facility: REHABILITATION | Age: 70
End: 2025-07-02
Payer: MEDICARE

## 2025-07-02 DIAGNOSIS — Z96.652 S/P TOTAL KNEE ARTHROPLASTY, LEFT: Primary | ICD-10-CM

## 2025-07-02 PROCEDURE — 97112 NEUROMUSCULAR REEDUCATION: CPT

## 2025-07-02 PROCEDURE — 97110 THERAPEUTIC EXERCISES: CPT

## 2025-07-02 PROCEDURE — 97140 MANUAL THERAPY 1/> REGIONS: CPT

## 2025-07-02 NOTE — PROGRESS NOTES
Daily Note     Today's date: 2025  Patient name: Iva Reid  : 1955  MRN: [id number]  Referring provider: No ref. provider found  Dx:   Encounter Diagnosis     ICD-10-CM    1. S/P total knee arthroplasty, left  Z96.652                      Subjective: Patient offers no new complaints. About the same.       Objective: See treatment diary below      Assessment: Tolerated treatment fair. Showing some progress each week. No increased symptoms post session. Concentration on building more LE strength. Continues to show good compliance to all HEP.  Patient would benefit from continued PT      Plan: Continue per plan of care.      Precautions: L TKA 25, HTN, Kidney     POC expires Unit limit Auth Expiration date PT/OT + Visit Limit?   25 bomn 25 bomn           Visit/Unit Tracking  AUTH Status:  Date    10 visits every RE Used 3 2 4    Remaining  7 8 6           Access Code: [email or web address]  URL: [email or web address]  Date: 2025  Prepared by: Yolanda Cheema    Exercises  - Supine Gluteal Sets  - 1-4 x daily - 7 x weekly - 1 sets - 10 reps - 10 second hold  - Supine Heel Slide  - 1-4 x daily - 7 x weekly - 1 sets - 10 reps - 5-10 second hold  - Supine Quad Set  - 1-4 x daily - 7 x weekly - 1-2 sets - 10 reps - 5-10 second hold  - Supine Hip Abduction  - 1-4 x daily - 7 x weekly - 3 sets - 10 reps  - Supine Active Straight Leg Raise  - 1-3 x daily - 7 x weekly - 3 sets - 10 reps - 2 second hold  - Supine Bridge  - 1 x daily - 7 x weekly - 3 sets - 5-8 reps  - Seated Knee Extension AAROM  - 1-4 x daily - 7 x weekly - 3 sets - 10 reps  - Seated Knee Flexion Extension AAROM with Overpressure  - 1-4 x daily - 7 x weekly - 3 sets - 10 reps  - Seated Knee Extension Stretch with Chair  - 1-3 x daily - 7 x weekly - 1 sets - 1 reps - 5 minutes hold  - Seated Calf Stretch with Strap  - 1-3 x daily - 7 x weekly - 1 sets - 10 reps - 10 second hold  - Seated Ankle Pumps  - 1-4 x daily  "- 7 x weekly - 3 sets - 10 reps    Patient Education  - Pain Management  - TKR: How Early Mobility Aids Recovery   - TKR: Surgery Overview  - TKR: Your Nerves and Your Knee--Pain Neuroscience Education for Knee Replacement    Manuals 5/27 5/29 6/3 6/5 6/10 6/12 6/20 6/26 7/2   L Knee PROM AR AR AR AR AR AR NV AR GF   L Patella Mobilization AR AR AR AR AR AR NV AR GF   L Tibiofemoral Posterior Mob AR AR AR AR AR AR  AR nv                Neuro Re-Ed            SLR No strap  20x No strap  20x #3  2x8 #3  3x6 #3  3x8  NV #3  3x10 3#   Eccentric Step Downs    4\"  10x Slant board  2x10 Slant board  2x10 Slant board  2x10 6\"  2x10  Toe-only 6\"  2x10  Toe-only 6\" 2 x 10                             Ther Ex             HEP/Patient Education            NuStep or RB NS  8' L5 RB 8' RB 8' RB 8' NS 8' RB 8' RB 8' RB 8' RB x 8'L2    LAQ #1  3x10 #2  25x #3  3x10 #3  3x10 Titan  #10  3x10 Titan  #20  2x10 b/l    #10 b/l Titan  #25  2x10 b/l    #10 single Titan  #25  2x10 b/l    #10 single Titan 25# 2 x 10 B    10# single    Bridge   3x5 np    3x5    Standing Hip Abduction HEP Foam  20x  B/L   Foam  2x10  B/L Foam  2x10  B/L Foam  3x10  B/L Foam  3x10  B/L Foam 3 x 10 B    STS 1 foam  3x5   1 foam  3x5  1 foam  3x6 No foam  #5  3x6 No foam  #5  3x8 No Foam  #8  3x6 No foam  #8  3x8 No foam 8# 3 x 8    Leg Press    #25  3x10 #40  3x10    L only  #25  2x10 #40  3x10    L only  #25  2x10 #55  3x10    L only  #25 #55  3x10    L only  #25 55# 3 x 10     L only   2 x 10                              Ther Activity             Fwd Step-Ups 6\"  15x 6\"  20x   9\"  20x 9\"  20x 9\"  #8  2x8 9\"  #8  2x8 9\" 8# 2 x 8    Lat Step-Ups 6\"  15x 6\"  20x   9\"  20x 9\"  20x 9\"  20x 9\"  20x 9\" x 20    Gait Training                                      Modalities                                                 "

## 2025-07-09 ENCOUNTER — OFFICE VISIT (OUTPATIENT)
Dept: PHYSICAL THERAPY | Facility: REHABILITATION | Age: 70
End: 2025-07-09
Payer: MEDICARE

## 2025-07-09 DIAGNOSIS — Z96.652 S/P TOTAL KNEE ARTHROPLASTY, LEFT: Primary | ICD-10-CM

## 2025-07-09 PROCEDURE — 97112 NEUROMUSCULAR REEDUCATION: CPT

## 2025-07-09 PROCEDURE — 97110 THERAPEUTIC EXERCISES: CPT

## 2025-07-09 NOTE — PROGRESS NOTES
Daily Note     Today's date: 2025  Patient name: Iva Reid  : 1955  MRN: [id number]  Referring provider: No ref. provider found  Dx:   Encounter Diagnosis     ICD-10-CM    1. S/P total knee arthroplasty, left  Z96.652           Start Time: 1110  Stop Time: 1204  Total time in clinic (min): 54 minutes    Subjective: Pt reports she is doing okay today. Gets sore at times.       Objective: See treatment diary below      Assessment: Tolerated treatment well. Pt with no complaints noted with ex's as noted below. Discussed continuation of HEP ex's at home, as pt also has a recumbent bike. Patient demonstrated fatigue post treatment, exhibited good technique with therapeutic exercises, and would benefit from continued PT      Plan: Continue per plan of care.      Precautions: L TKA 25, HTN, Kidney     POC expires Unit limit Auth Expiration date PT/OT + Visit Limit?   25 bomn 25 bomn           Visit/Unit Tracking  AUTH Status:  Date    10 visits every RE Used 3 2 4      Remaining  7 8 6             Access Code: [email or web address]  URL: [email or web address]  Date: 2025  Prepared by: Yolanda Cheema    Exercises  - Supine Gluteal Sets  - 1-4 x daily - 7 x weekly - 1 sets - 10 reps - 10 second hold  - Supine Heel Slide  - 1-4 x daily - 7 x weekly - 1 sets - 10 reps - 5-10 second hold  - Supine Quad Set  - 1-4 x daily - 7 x weekly - 1-2 sets - 10 reps - 5-10 second hold  - Supine Hip Abduction  - 1-4 x daily - 7 x weekly - 3 sets - 10 reps  - Supine Active Straight Leg Raise  - 1-3 x daily - 7 x weekly - 3 sets - 10 reps - 2 second hold  - Supine Bridge  - 1 x daily - 7 x weekly - 3 sets - 5-8 reps  - Seated Knee Extension AAROM  - 1-4 x daily - 7 x weekly - 3 sets - 10 reps  - Seated Knee Flexion Extension AAROM with Overpressure  - 1-4 x daily - 7 x weekly - 3 sets - 10 reps  - Seated Knee Extension Stretch with Chair  - 1-3 x daily - 7 x weekly - 1 sets - 1 reps - 5  "minutes hold  - Seated Calf Stretch with Strap  - 1-3 x daily - 7 x weekly - 1 sets - 10 reps - 10 second hold  - Seated Ankle Pumps  - 1-4 x daily - 7 x weekly - 3 sets - 10 reps    Patient Education  - Pain Management  - TKR: How Early Mobility Aids Recovery   - TKR: Surgery Overview  - TKR: Your Nerves and Your Knee--Pain Neuroscience Education for Knee Replacement    Manuals 5/27 5/29 6/3 6/5 6/10 6/12 6/20 6/26 7/2 7/9   L Knee PROM AR AR AR AR AR AR NV AR GF NA   L Patella Mobilization AR AR AR AR AR AR NV AR GF NA   L Tibiofemoral Posterior Mob AR AR AR AR AR AR  AR nv NA                  Neuro Re-Ed             SLR No strap  20x No strap  20x #3  2x8 #3  3x6 #3  3x8  NV #3  3x10 3#    Eccentric Step Downs    4\"  10x Slant board  2x10 Slant board  2x10 Slant board  2x10 6\"  2x10  Toe-only 6\"  2x10  Toe-only 6\" 2 x 10  6\" 2x10                               Ther Ex              HEP/Patient Education             NuStep or RB NS  8' L5 RB 8' RB 8' RB 8' NS 8' RB 8' RB 8' RB 8' RB x 8'L2  RB x8' L2   LAQ #1  3x10 #2  25x #3  3x10 #3  3x10 Titan  #10  3x10 Titan  #20  2x10 b/l    #10 b/l Titan  #25  2x10 b/l    #10 single Titan  #25  2x10 b/l    #10 single Titan 25# 2 x 10 B    10# single  Titan 25# 2x10 B    10# single   Bridge   3x5 np    3x5     Standing Hip Abduction HEP Foam  20x  B/L   Foam  2x10  B/L Foam  2x10  B/L Foam  3x10  B/L Foam  3x10  B/L Foam 3 x 10 B Foam 3x10 B     STS 1 foam  3x5   1 foam  3x5  1 foam  3x6 No foam  #5  3x6 No foam  #5  3x8 No Foam  #8  3x6 No foam  #8  3x8 No foam 8# 3 x 8 No foam 8# 3x8    Leg Press    #25  3x10 #40  3x10    L only  #25  2x10 #40  3x10    L only  #25  2x10 #55  3x10    L only  #25 #55  3x10    L only  #25 55# 3 x 10     L only   2 x 10  55# 3x10    L only 2x10 25#                                Ther Activity              Fwd Step-Ups 6\"  15x 6\"  20x   9\"  20x 9\"  20x 9\"  #8  2x8 9\"  #8  2x8 9\" 8# 2 x 8 9\" 8# 2x8    Lat Step-Ups 6\"  15x 6\"  20x   9\"  20x 9\"  20x " "9\"  20x 9\"  20x 9\" x 20  9\" x 20    Gait Training                                         Modalities                                                      "

## 2025-07-16 ENCOUNTER — HOSPITAL ENCOUNTER (OUTPATIENT)
Dept: RADIOLOGY | Age: 70
Discharge: HOME | End: 2025-07-16
Payer: MEDICARE

## 2025-07-16 DIAGNOSIS — D17.71 ANGIOMYOLIPOMA OF BOTH KIDNEYS: ICD-10-CM

## 2025-07-16 DIAGNOSIS — N20.0 NEPHROLITHIASIS: ICD-10-CM

## 2025-07-16 PROCEDURE — 76775 US EXAM ABDO BACK WALL LIM: CPT

## 2025-07-17 ENCOUNTER — OFFICE VISIT (OUTPATIENT)
Dept: PHYSICAL THERAPY | Facility: REHABILITATION | Age: 70
End: 2025-07-17
Payer: MEDICARE

## 2025-07-17 DIAGNOSIS — Z96.652 AFTERCARE FOLLOWING LEFT KNEE JOINT REPLACEMENT SURGERY: Primary | ICD-10-CM

## 2025-07-17 DIAGNOSIS — Z47.1 AFTERCARE FOLLOWING LEFT KNEE JOINT REPLACEMENT SURGERY: Primary | ICD-10-CM

## 2025-07-17 DIAGNOSIS — Z96.652 S/P TOTAL KNEE ARTHROPLASTY, LEFT: Primary | ICD-10-CM

## 2025-07-17 PROCEDURE — 97110 THERAPEUTIC EXERCISES: CPT

## 2025-07-17 PROCEDURE — 97140 MANUAL THERAPY 1/> REGIONS: CPT

## 2025-07-17 NOTE — PROGRESS NOTES
Daily Note     Today's date: 2025  Patient name: Iva Reid  : 1955  MRN: [id number]  Referring provider: No ref. provider found  Dx:   Encounter Diagnosis     ICD-10-CM    1. S/P total knee arthroplasty, left  Z96.652                        Subjective: Pt reports she is doing okay today. Going down stairs is improving.      Objective: See treatment diary below      Assessment: Tolerated treatment well. Pt with no complaints noted with ex's as noted below. Discussed continuation of HEP ex's at home, as pt also has a recumbent bike. Patient demonstrated fatigue post treatment, exhibited good technique with therapeutic exercises, and would benefit from continued PT      Plan: Continue per plan of care.      Precautions: L TKA 25, HTN, Kidney     POC expires Unit limit Auth Expiration date PT/OT + Visit Limit?   25 bomn 25 bomn           Visit/Unit Tracking  AUTH Status:  Date    10 visits every RE Used 19    Remaining  1           Access Code: [email or web address]  URL: [email or web address]  Date: 2025  Prepared by: Yolanda Cheema    Exercises  - Supine Gluteal Sets  - 1-4 x daily - 7 x weekly - 1 sets - 10 reps - 10 second hold  - Supine Heel Slide  - 1-4 x daily - 7 x weekly - 1 sets - 10 reps - 5-10 second hold  - Supine Quad Set  - 1-4 x daily - 7 x weekly - 1-2 sets - 10 reps - 5-10 second hold  - Supine Hip Abduction  - 1-4 x daily - 7 x weekly - 3 sets - 10 reps  - Supine Active Straight Leg Raise  - 1-3 x daily - 7 x weekly - 3 sets - 10 reps - 2 second hold  - Supine Bridge  - 1 x daily - 7 x weekly - 3 sets - 5-8 reps  - Seated Knee Extension AAROM  - 1-4 x daily - 7 x weekly - 3 sets - 10 reps  - Seated Knee Flexion Extension AAROM with Overpressure  - 1-4 x daily - 7 x weekly - 3 sets - 10 reps  - Seated Knee Extension Stretch with Chair  - 1-3 x daily - 7 x weekly - 1 sets - 1 reps - 5 minutes hold  - Seated Calf Stretch with Strap  - 1-3 x daily - 7 x weekly - 1  "sets - 10 reps - 10 second hold  - Seated Ankle Pumps  - 1-4 x daily - 7 x weekly - 3 sets - 10 reps    Patient Education  - Pain Management  - TKR: How Early Mobility Aids Recovery   - TKR: Surgery Overview  - TKR: Your Nerves and Your Knee--Pain Neuroscience Education for Knee Replacement    Manuals 6/3 6/5 6/10 6/12 6/20 6/26 7/2 7/9 7/17   L Knee PROM AR AR AR AR NV AR GF NA AR   L Patella Mobilization AR AR AR AR NV AR GF NA AR   L Tibiofemoral Posterior Mob AR AR AR AR  AR nv NA  AR                Neuro Re-Ed            SLR #3  2x8 #3  3x6 #3  3x8  NV #3  3x10 3#  #3  3x8-10   Eccentric Step Downs  4\"  10x Slant board  2x10 Slant board  2x10 Slant board  2x10 6\"  2x10  Toe-only 6\"  2x10  Toe-only 6\" 2 x 10  6\" 2x10  6\"  2x10   Mini Squats         3x5                Ther Ex             HEP/Patient Education            NuStep or RB RB 8' RB 8' NS 8' RB 8' RB 8' RB 8' RB x 8'L2  RB x8' L2 RB 8'   LAQ #3  3x10 #3  3x10 Titan  #10  3x10 Titan  #20  2x10 b/l    #10 b/l Titan  #25  2x10 b/l    #10 single Titan  #25  2x10 b/l    #10 single Titan 25# 2 x 10 B    10# single  Titan 25# 2x10 B    10# single #5  25-30x   Bridge 3x5 np    3x5   3x8   Standing Hip Abduction   Foam  2x10  B/L Foam  2x10  B/L Foam  3x10  B/L Foam  3x10  B/L Foam 3 x 10 B Foam 3x10 B      STS  1 foam  3x6 No foam  #5  3x6 No foam  #5  3x8 No Foam  #8  3x6 No foam  #8  3x8 No foam 8# 3 x 8 No foam 8# 3x8 #10  3x8    Leg Press  #25  3x10 #40  3x10    L only  #25  2x10 #40  3x10    L only  #25  2x10 #55  3x10    L only  #25 #55  3x10    L only  #25 55# 3 x 10     L only   2 x 10  55# 3x10    L only 2x10 25#  skip                             Ther Activity             Fwd Step-Ups   9\"  20x 9\"  20x 9\"  #8  2x8 9\"  #8  2x8 9\" 8# 2 x 8 9\" 8# 2x8 9\"  #8  2x10    Lat Step-Ups   9\"  20x 9\"  20x 9\"  20x 9\"  20x 9\" x 20  9\" x 20     Gait Training                                      Modalities                                                   "

## 2025-07-23 ENCOUNTER — OFFICE VISIT (OUTPATIENT)
Dept: PHYSICAL THERAPY | Facility: REHABILITATION | Age: 70
End: 2025-07-23
Payer: MEDICARE

## 2025-07-23 DIAGNOSIS — Z96.652 S/P TOTAL KNEE ARTHROPLASTY, LEFT: Primary | ICD-10-CM

## 2025-07-23 PROCEDURE — 97140 MANUAL THERAPY 1/> REGIONS: CPT

## 2025-07-23 PROCEDURE — 97110 THERAPEUTIC EXERCISES: CPT

## 2025-07-23 PROCEDURE — 97112 NEUROMUSCULAR REEDUCATION: CPT

## 2025-07-23 NOTE — PROGRESS NOTES
Daily Note     Today's date: 2025  Patient name: Iva Reid  : 1955  MRN: [id number]  Referring provider: No ref. provider found  Dx:   Encounter Diagnosis     ICD-10-CM    1. S/P total knee arthroplasty, left  Z96.652                      Subjective: Pt reported lateral knee soreness but overall feeling okay.       Objective: See treatment diary below      Assessment: Tolerated treatment well. Patient demonstrated fatigue post treatment and exhibited good technique with therapeutic exercises. VC's needed for correct technique throughout session. Rest breaks needed but improved tolerance with exercises and manuals. MD appt .      Plan: Continue per plan of care. 1 on 1 with PTA for 53 mins     Precautions: L TKA 25, HTN, Kidney     POC expires Unit limit Auth Expiration date PT/OT + Visit Limit?   25 bomn 25 bomn           Visit/Unit Tracking  AUTH Status:  Date    10 visits every RE Used 19 20    Remaining  1 2           Access Code: [email or web address]  URL: [email or web address]  Date: 2025  Prepared by: Yolanda Cheema    Exercises  - Supine Gluteal Sets  - 1-4 x daily - 7 x weekly - 1 sets - 10 reps - 10 second hold  - Supine Heel Slide  - 1-4 x daily - 7 x weekly - 1 sets - 10 reps - 5-10 second hold  - Supine Quad Set  - 1-4 x daily - 7 x weekly - 1-2 sets - 10 reps - 5-10 second hold  - Supine Hip Abduction  - 1-4 x daily - 7 x weekly - 3 sets - 10 reps  - Supine Active Straight Leg Raise  - 1-3 x daily - 7 x weekly - 3 sets - 10 reps - 2 second hold  - Supine Bridge  - 1 x daily - 7 x weekly - 3 sets - 5-8 reps  - Seated Knee Extension AAROM  - 1-4 x daily - 7 x weekly - 3 sets - 10 reps  - Seated Knee Flexion Extension AAROM with Overpressure  - 1-4 x daily - 7 x weekly - 3 sets - 10 reps  - Seated Knee Extension Stretch with Chair  - 1-3 x daily - 7 x weekly - 1 sets - 1 reps - 5 minutes hold  - Seated Calf Stretch with Strap  - 1-3 x daily - 7 x weekly - 1  "sets - 10 reps - 10 second hold  - Seated Ankle Pumps  - 1-4 x daily - 7 x weekly - 3 sets - 10 reps    Patient Education  - Pain Management  - TKR: How Early Mobility Aids Recovery   - TKR: Surgery Overview  - TKR: Your Nerves and Your Knee--Pain Neuroscience Education for Knee Replacement    Manuals 6/12 6/20 6/26 7/2 7/9 7/17 7/23   L Knee PROM AR NV AR GF NA AR TC   L Patella Mobilization AR NV AR GF NA AR    L Tibiofemoral Posterior Mob AR  AR nv NA  AR               Neuro Re-Ed          SLR  NV #3  3x10 3#  #3  3x8-10 3# 3x8   Eccentric Step Downs  Slant board  2x10 6\"  2x10  Toe-only 6\"  2x10  Toe-only 6\" 2 x 10  6\" 2x10  6\"  2x10 6\" 2x10   Mini Squats      3x5 3x5              Ther Ex           HEP/Patient Education          NuStep or RB RB 8' RB 8' RB 8' RB x 8'L2  RB x8' L2 RB 8' RB x8'   LAQ Titan  #20  2x10 b/l    #10 b/l Titan  #25  2x10 b/l    #10 single Titan  #25  2x10 b/l    #10 single Titan 25# 2 x 10 B    10# single  Titan 25# 2x10 B    10# single #5  25-30x 5# 25-30   Bridge   3x5   3x8 3x8   Standing Hip Abduction Foam  2x10  B/L Foam  3x10  B/L Foam  3x10  B/L Foam 3 x 10 B Foam 3x10 B       STS No foam  #5  3x8 No Foam  #8  3x6 No foam  #8  3x8 No foam 8# 3 x 8 No foam 8# 3x8 #10  3x8 10# 3x8    Leg Press #40  3x10    L only  #25  2x10 #55  3x10    L only  #25 #55  3x10    L only  #25 55# 3 x 10     L only   2 x 10  55# 3x10    L only 2x10 25#  skip                          Ther Activity           Fwd Step-Ups 9\"  20x 9\"  #8  2x8 9\"  #8  2x8 9\" 8# 2 x 8 9\" 8# 2x8 9\"  #8  2x10 9\" 8# 2x10    Lat Step-Ups 9\"  20x 9\"  20x 9\"  20x 9\" x 20  9\" x 20      Gait Training                                Modalities                                               "

## 2025-07-23 NOTE — PROGRESS NOTES
Daily Note     Today's date: 2025  Patient name: Iva Reid  : 1955  MRN: [id number]  Referring provider: No ref. provider found  Dx: No diagnosis found.               Subjective: ***      Objective: See treatment diary below      Assessment: Patient tolerated treatment session well.       Plan: Continue per POC. Increase reps/resistance as tolerated.      Precautions: L TKA 25, HTN, Kidney     POC expires Unit limit Auth Expiration date PT/OT + Visit Limit?   25 bomn 25 bomn           Visit/Unit Tracking  AUTH Status:  Date    10 visits every RE Used 19 18    Remaining  1 0           Access Code: [email or web address]  URL: [email or web address]  Date: 2025  Prepared by: Yolanda Cheema    Exercises  - Supine Gluteal Sets  - 1-4 x daily - 7 x weekly - 1 sets - 10 reps - 10 second hold  - Supine Heel Slide  - 1-4 x daily - 7 x weekly - 1 sets - 10 reps - 5-10 second hold  - Supine Quad Set  - 1-4 x daily - 7 x weekly - 1-2 sets - 10 reps - 5-10 second hold  - Supine Hip Abduction  - 1-4 x daily - 7 x weekly - 3 sets - 10 reps  - Supine Active Straight Leg Raise  - 1-3 x daily - 7 x weekly - 3 sets - 10 reps - 2 second hold  - Supine Bridge  - 1 x daily - 7 x weekly - 3 sets - 5-8 reps  - Seated Knee Extension AAROM  - 1-4 x daily - 7 x weekly - 3 sets - 10 reps  - Seated Knee Flexion Extension AAROM with Overpressure  - 1-4 x daily - 7 x weekly - 3 sets - 10 reps  - Seated Knee Extension Stretch with Chair  - 1-3 x daily - 7 x weekly - 1 sets - 1 reps - 5 minutes hold  - Seated Calf Stretch with Strap  - 1-3 x daily - 7 x weekly - 1 sets - 10 reps - 10 second hold  - Seated Ankle Pumps  - 1-4 x daily - 7 x weekly - 3 sets - 10 reps    Patient Education  - Pain Management  - TKR: How Early Mobility Aids Recovery   - TKR: Surgery Overview  - TKR: Your Nerves and Your Knee--Pain Neuroscience Education for Knee Replacement    Manuals 6/3 6/5 6/10 6/12 6/20 6/26 7/2 7/9 7/17  "7/23   L Knee PROM AR AR AR AR NV AR GF NA AR    L Patella Mobilization AR AR AR AR NV AR GF NA AR    L Tibiofemoral Posterior Mob AR AR AR AR  AR nv NA  AR                  Neuro Re-Ed             SLR #3  2x8 #3  3x6 #3  3x8  NV #3  3x10 3#  #3  3x8-10    Eccentric Step Downs  4\"  10x Slant board  2x10 Slant board  2x10 Slant board  2x10 6\"  2x10  Toe-only 6\"  2x10  Toe-only 6\" 2 x 10  6\" 2x10  6\"  2x10    Mini Squats         3x5                  Ther Ex              HEP/Patient Education             NuStep or RB RB 8' RB 8' NS 8' RB 8' RB 8' RB 8' RB x 8'L2  RB x8' L2 RB 8'    LAQ #3  3x10 #3  3x10 Titan  #10  3x10 Titan  #20  2x10 b/l    #10 b/l Titan  #25  2x10 b/l    #10 single Titan  #25  2x10 b/l    #10 single Titan 25# 2 x 10 B    10# single  Titan 25# 2x10 B    10# single #5  25-30x    Bridge 3x5 np    3x5   3x8    Standing Hip Abduction   Foam  2x10  B/L Foam  2x10  B/L Foam  3x10  B/L Foam  3x10  B/L Foam 3 x 10 B Foam 3x10 B       STS  1 foam  3x6 No foam  #5  3x6 No foam  #5  3x8 No Foam  #8  3x6 No foam  #8  3x8 No foam 8# 3 x 8 No foam 8# 3x8 #10  3x8     Leg Press  #25  3x10 #40  3x10    L only  #25  2x10 #40  3x10    L only  #25  2x10 #55  3x10    L only  #25 #55  3x10    L only  #25 55# 3 x 10     L only   2 x 10  55# 3x10    L only 2x10 25#  skip                                Ther Activity              Fwd Step-Ups   9\"  20x 9\"  20x 9\"  #8  2x8 9\"  #8  2x8 9\" 8# 2 x 8 9\" 8# 2x8 9\"  #8  2x10     Lat Step-Ups   9\"  20x 9\"  20x 9\"  20x 9\"  20x 9\" x 20  9\" x 20      Gait Training                                         Modalities                                                        "

## 2025-07-30 ENCOUNTER — HOSPITAL ENCOUNTER (OUTPATIENT)
Dept: RADIOLOGY | Facility: HOSPITAL | Age: 70
Discharge: HOME | End: 2025-07-30
Attending: ORTHOPAEDIC SURGERY
Payer: MEDICARE

## 2025-07-30 ENCOUNTER — OFFICE VISIT (OUTPATIENT)
Dept: OBGYN CLINIC | Facility: HOSPITAL | Age: 70
End: 2025-07-30

## 2025-07-30 VITALS — BODY MASS INDEX: 29.85 KG/M2 | HEIGHT: 61 IN

## 2025-07-30 DIAGNOSIS — Z47.1 AFTERCARE FOLLOWING LEFT KNEE JOINT REPLACEMENT SURGERY: Primary | ICD-10-CM

## 2025-07-30 DIAGNOSIS — Z47.1 AFTERCARE FOLLOWING LEFT KNEE JOINT REPLACEMENT SURGERY: ICD-10-CM

## 2025-07-30 DIAGNOSIS — Z96.652 AFTERCARE FOLLOWING LEFT KNEE JOINT REPLACEMENT SURGERY: ICD-10-CM

## 2025-07-30 DIAGNOSIS — Z96.652 AFTERCARE FOLLOWING LEFT KNEE JOINT REPLACEMENT SURGERY: Primary | ICD-10-CM

## 2025-07-30 PROCEDURE — 73560 X-RAY EXAM OF KNEE 1 OR 2: CPT

## 2025-07-30 PROCEDURE — 99024 POSTOP FOLLOW-UP VISIT: CPT | Performed by: ORTHOPAEDIC SURGERY

## 2025-07-30 RX ORDER — CEPHALEXIN 500 MG/1
CAPSULE ORAL
Qty: 40 CAPSULE | Refills: 0 | Status: SHIPPED | OUTPATIENT
Start: 2025-07-30 | End: 2025-07-31

## 2025-07-31 ENCOUNTER — OFFICE VISIT (OUTPATIENT)
Dept: UROLOGY | Facility: AMBULATORY SURGERY CENTER | Age: 70
End: 2025-07-31
Payer: MEDICARE

## 2025-07-31 VITALS
HEART RATE: 68 BPM | HEIGHT: 61 IN | WEIGHT: 158 LBS | BODY MASS INDEX: 29.83 KG/M2 | DIASTOLIC BLOOD PRESSURE: 74 MMHG | SYSTOLIC BLOOD PRESSURE: 118 MMHG | OXYGEN SATURATION: 98 %

## 2025-07-31 DIAGNOSIS — D17.9 ANGIOMYOLIPOMA: Primary | ICD-10-CM

## 2025-07-31 DIAGNOSIS — N20.0 NEPHROLITHIASIS: ICD-10-CM

## 2025-07-31 DIAGNOSIS — N39.3 STRESS INCONTINENCE: ICD-10-CM

## 2025-07-31 PROCEDURE — 99213 OFFICE O/P EST LOW 20 MIN: CPT

## 2025-08-01 ENCOUNTER — OFFICE VISIT (OUTPATIENT)
Dept: PHYSICAL THERAPY | Facility: REHABILITATION | Age: 70
End: 2025-08-01
Payer: MEDICARE

## 2025-08-01 DIAGNOSIS — Z96.652 S/P TOTAL KNEE ARTHROPLASTY, LEFT: Primary | ICD-10-CM

## 2025-08-01 PROCEDURE — 97110 THERAPEUTIC EXERCISES: CPT

## 2025-08-01 PROCEDURE — 97112 NEUROMUSCULAR REEDUCATION: CPT

## 2025-08-03 DIAGNOSIS — I10 ESSENTIAL HYPERTENSION: ICD-10-CM

## 2025-08-05 RX ORDER — METOPROLOL SUCCINATE 25 MG/1
25 TABLET, EXTENDED RELEASE ORAL DAILY
Qty: 90 TABLET | Refills: 1 | Status: SHIPPED | OUTPATIENT
Start: 2025-08-05

## 2025-08-06 ENCOUNTER — EVALUATION (OUTPATIENT)
Dept: PHYSICAL THERAPY | Facility: REHABILITATION | Age: 70
End: 2025-08-06
Payer: MEDICARE

## 2025-08-06 DIAGNOSIS — Z96.652 S/P TOTAL KNEE ARTHROPLASTY, LEFT: Primary | ICD-10-CM

## 2025-08-06 PROCEDURE — 97110 THERAPEUTIC EXERCISES: CPT | Performed by: PHYSICAL THERAPIST

## 2025-08-07 ENCOUNTER — ANNUAL EXAM (OUTPATIENT)
Dept: OBGYN CLINIC | Facility: CLINIC | Age: 70
End: 2025-08-07
Payer: MEDICARE

## 2025-08-11 ENCOUNTER — HOSPITAL ENCOUNTER (OUTPATIENT)
Dept: RADIOLOGY | Age: 70
Discharge: HOME | End: 2025-08-11
Payer: MEDICARE

## (undated) DEVICE — GROUNDING PAD RFL

## (undated) DEVICE — 3 BONE CEMENT MIXER: Brand: MIXEVAC

## (undated) DEVICE — HANDPIECE SET WITH RETRACTABLE COAXIAL FAN SPRAY TIP AND SUCTION TUBE: Brand: INTERPULSE

## (undated) DEVICE — GLOVE INDICATOR PI UNDERGLOVE SZ 7.5 BLUE

## (undated) DEVICE — VELYS ROBOT-ASSISTED SOLUTION ARRAY DRILL PIN 125 MM X 4 MM: Brand: VELYS

## (undated) DEVICE — STOCKINETTE: Brand: DEROYAL

## (undated) DEVICE — ASTOUND STANDARD SURGICAL GOWN, XL: Brand: CONVERTORS

## (undated) DEVICE — GLOVE SRG BIOGEL ORTHOPEDIC 8

## (undated) DEVICE — GLOVE INDICATOR PI UNDERGLOVE SZ 7 BLUE

## (undated) DEVICE — JP 3-SPRING RES W/10FR PVC DRAIN/TR: Brand: CARDINAL HEALTH

## (undated) DEVICE — BETHLEHEM UNIV TOTAL KNEE, KIT: Brand: CARDINAL HEALTH

## (undated) DEVICE — SUT VICRYL PLUS 2-0 CTB-1 27 IN VCPB259H

## (undated) DEVICE — ACE WRAP 6 IN STERILE

## (undated) DEVICE — 3M™ IOBAN™ 2 ANTIMICROBIAL INCISE DRAPE 6650EZ: Brand: IOBAN™ 2

## (undated) DEVICE — GLOVE SRG BIOGEL 6.5

## (undated) DEVICE — SMOKE EVAC FLUID SUCTION HEPA FILTER

## (undated) DEVICE — ACE WRAP 6 IN UNSTERILE

## (undated) DEVICE — VELYS ROBOTIC-ASSISTED SOLUTION ARRAY SET - KNEE: Brand: VELYS

## (undated) DEVICE — NO-SCRATCH ™ SMALL WHITNEY CURETTE ™ IS A SINGLE-USE, PLASTIC CURETTE FOR QUICKLY APPLYING, MANIPULATING AND REMOVING BONE CEMENT DURING HIP AND KNEE REPLACEMENT SURGERY. THE PLASTIC IS SOFTER THAN STEEL INSTRUMENTS, REDUCING THE RISK OF DAMAGING THE PROSTHESIS WITH METAL INSTRUMENTS.  THE CURETTE’S 6MM TIP REMOVES EXCESS CEMENT FROM REPLACEMENT HIPS AND KNEES. EASY-TO-MANEUVER, THE SMALL BLUE CURETTE LETS YOU REMOVE CEMENT FROM ALL EDGES OF THE PROSTHESIS.NO-SCRATCH WHITNEY SMALL CURETTE FEATURES:SAFER THAN STEEL- MADE OF PLASTIC - STURDY YET SOFTER THAN SURGICAL STEEL.HANDIER- EACH TOOL HAS A MOLDED-IN THUMB INDENTATION INSTANTLY ORIENTING THE TOOL.- EASIER TO MANEUVER IN HARD TO SEE PLACES.- COLOR-CODED FOR EASY IDENTIFICATION.FASTER- COMES INDIVIDUALLY PACKAGED IN STERILE, PEEL OPEN POUCH, READY TO GO.- APPLIES, MANIPULATES, OR REMOVES CEMENT WITH FINGERTIP PRECISION.ECONOMICAL- THE COST OF A SINGLE REVISION DWARFS THE COST OF A SINGLE-USE CURETTE. - DISPOSABLE – THERE’S NO NEED TO WASTE TIME REMOVING HARDENED CEMENT OR RE-STERILIZING TOOLS.- LESS EXPENSIVE TO BUY AND INVENTORY - ORDER ONLY THE TOOL YOU USE.- PACKAGED 25 INDIVIDUALLY WRAPPED TOOLS TO A CARTON FOR CONVENIENT SHELF STORAGE.: Brand: WHITNEY NO-SCRATCH CURETTE (SMALL)

## (undated) DEVICE — GLOVE SRG BIOGEL 8

## (undated) DEVICE — WET SKIN PREP TRAY: Brand: MEDLINE INDUSTRIES, INC.

## (undated) DEVICE — GLOVE SRG BIOGEL 7.5

## (undated) DEVICE — HOOD WITH PEEL AWAY FACE SHIELD: Brand: T7PLUS

## (undated) DEVICE — HOOD: Brand: T7PLUS

## (undated) DEVICE — PAD CAST 6 IN COTTON NON STERILE

## (undated) DEVICE — SAW BLADE OSCILLATING BRAZOL 167

## (undated) DEVICE — SUT VICRYL PLUS 1 CTB-1 36 IN VCPB947H

## (undated) DEVICE — ABDOMINAL PAD: Brand: DERMACEA

## (undated) DEVICE — CAPIT KNEE ATTUNE FB W/DOM AOX

## (undated) DEVICE — VELYS ROBOT DRAPE

## (undated) DEVICE — BAG POLY CLEAR 12 X 8 X 30

## (undated) DEVICE — GLOVE INDICATOR PI UNDERGLOVE SZ 8 BLUE

## (undated) DEVICE — STIRRUP STRAP ADULT DISP

## (undated) DEVICE — TRAY FOLEY 16FR URIMETER SILICONE SURESTEP

## (undated) DEVICE — NEPTUNE E-SEP SMOKE EVACUATION PENCIL, COATED, 70MM BLADE, PUSH BUTTON SWITCH: Brand: NEPTUNE E-SEP

## (undated) DEVICE — DRAPE EQUIPMENT RF WAND

## (undated) DEVICE — GLOVE INDICATOR PI UNDERGLOVE SZ 8.5 BLUE

## (undated) DEVICE — VELYS SATEL DRAPE

## (undated) DEVICE — BLADE SAW RECIP 12.5 X 76MM 0.9/0.9MM THCK

## (undated) DEVICE — PADDING CAST 4 IN  COTTON STRL

## (undated) DEVICE — VELYS BLADE SAW OSC 85 X 19 X 2MM

## (undated) DEVICE — SILVER-COATED ANTIMICROBIAL BARRIER DRESSING: Brand: ACTICOAT   4" X 8"